# Patient Record
Sex: FEMALE | Race: WHITE | HISPANIC OR LATINO | Employment: OTHER | URBAN - METROPOLITAN AREA
[De-identification: names, ages, dates, MRNs, and addresses within clinical notes are randomized per-mention and may not be internally consistent; named-entity substitution may affect disease eponyms.]

---

## 2017-02-10 ENCOUNTER — GENERIC CONVERSION - ENCOUNTER (OUTPATIENT)
Dept: OTHER | Facility: OTHER | Age: 42
End: 2017-02-10

## 2018-02-01 ENCOUNTER — HOSPITAL ENCOUNTER (EMERGENCY)
Facility: HOSPITAL | Age: 43
Discharge: HOME | End: 2018-02-01
Attending: EMERGENCY MEDICINE | Admitting: EMERGENCY MEDICINE
Payer: MEDICARE

## 2018-02-01 VITALS
TEMPERATURE: 98.3 F | SYSTOLIC BLOOD PRESSURE: 147 MMHG | RESPIRATION RATE: 16 BRPM | HEART RATE: 74 BPM | BODY MASS INDEX: 50.02 KG/M2 | OXYGEN SATURATION: 100 % | DIASTOLIC BLOOD PRESSURE: 89 MMHG | HEIGHT: 64 IN | WEIGHT: 293 LBS

## 2018-02-01 DIAGNOSIS — J01.90 ACUTE SINUSITIS: Primary | ICD-10-CM

## 2018-02-01 PROCEDURE — 99282 EMERGENCY DEPT VISIT SF MDM: CPT

## 2018-02-01 RX ORDER — ALBUTEROL SULFATE 90 UG/1
2 AEROSOL, METERED RESPIRATORY (INHALATION) EVERY 6 HOURS PRN
Qty: 1 INHALER | Refills: 0 | Status: SHIPPED | OUTPATIENT
Start: 2018-02-01

## 2018-02-01 RX ORDER — FLUTICASONE PROPIONATE 50 MCG
2 SPRAY, SUSPENSION (ML) NASAL DAILY
Qty: 1 BOTTLE | Refills: 0 | Status: SHIPPED | OUTPATIENT
Start: 2018-02-01

## 2018-02-01 RX ORDER — LEVOFLOXACIN 500 MG/1
500 TABLET, FILM COATED ORAL DAILY
Qty: 10 TABLET | Refills: 0 | Status: SHIPPED | OUTPATIENT
Start: 2018-02-01 | End: 2018-02-11

## 2018-02-01 NOTE — ED PROVIDER NOTES
History  Chief Complaint   Patient presents with    Nasal Congestion     Patient stated that her eyes are swollen, nasal congestion and nausea since   Patient is a 47-year-old female  She presents to emergency room with several days nasal congestion and sinus pain  She has been experiencing chills  No measured fever  He has had headache  No neck pain  No photophobia  No mental status changes  She does have a slight cough  No sore throat  No myalgias  No shortness of breath  Symptoms are moderate in intensity  No aggravating or relieving factors  None       Past Medical History:   Diagnosis Date    Asthma     Hypertension        Past Surgical History:   Procedure Laterality Date     SECTION         History reviewed  No pertinent family history  I have reviewed and agree with the history as documented  Social History   Substance Use Topics    Smoking status: Former Smoker    Smokeless tobacco: Never Used    Alcohol use No        Review of Systems   Constitutional: Positive for chills  Negative for fever  HENT: Positive for congestion, postnasal drip, rhinorrhea and sinus pain  Negative for sinus pressure and sore throat  Eyes: Negative for pain, redness and visual disturbance  Respiratory: Positive for cough  Negative for shortness of breath  Cardiovascular: Negative for chest pain and leg swelling  Gastrointestinal: Negative for abdominal pain, diarrhea and vomiting  Endocrine: Negative for polydipsia and polyuria  Genitourinary: Negative for dysuria, frequency, hematuria, vaginal bleeding and vaginal discharge  Musculoskeletal: Negative for back pain and neck pain  Skin: Negative for rash and wound  Allergic/Immunologic: Negative for immunocompromised state  Neurological: Positive for headaches  Negative for weakness and numbness  Hematological: Does not bruise/bleed easily     Psychiatric/Behavioral: Negative for hallucinations and suicidal ideas  All other systems reviewed and are negative  Physical Exam  ED Triage Vitals [02/01/18 1028]   Temperature Pulse Respirations Blood Pressure SpO2   98 3 °F (36 8 °C) 74 16 147/89 100 %      Temp Source Heart Rate Source Patient Position - Orthostatic VS BP Location FiO2 (%)   Oral -- Sitting Left arm --      Pain Score       --           Orthostatic Vital Signs  Vitals:    02/01/18 1028   BP: 147/89   Pulse: 74   Patient Position - Orthostatic VS: Sitting       Physical Exam   Constitutional: She is oriented to person, place, and time  She appears well-developed and well-nourished  No distress  Obese female  HENT:   Head: Normocephalic and atraumatic  Mouth/Throat: Oropharynx is clear and moist    There is sinus tenderness  Eyes: Conjunctivae are normal  Right eye exhibits no discharge  Left eye exhibits no discharge  No scleral icterus  Neck: Normal range of motion  Neck supple  Cardiovascular: Normal rate, regular rhythm, normal heart sounds and intact distal pulses  Exam reveals no gallop and no friction rub  No murmur heard  Pulmonary/Chest: Effort normal and breath sounds normal  No stridor  No respiratory distress  She has no wheezes  She has no rales  Abdominal: Soft  Bowel sounds are normal  She exhibits no distension  There is no tenderness  There is no rebound and no guarding  Musculoskeletal: Normal range of motion  She exhibits no edema, tenderness or deformity  No CVA tenderness  No calf tenderness/palpable cords  Neurological: She is alert and oriented to person, place, and time  She has normal strength  No sensory deficit  GCS eye subscore is 4  GCS verbal subscore is 5  GCS motor subscore is 6  Skin: Skin is warm and dry  No rash noted  She is not diaphoretic  Psychiatric: She has a normal mood and affect  Her behavior is normal    Vitals reviewed        ED Medications  Medications - No data to display    Diagnostic Studies  Results Reviewed     None No orders to display              Procedures  Procedures       Phone Contacts  ED Phone Contact    ED Course  ED Course                                MDM  CritCare Time    Disposition  Final diagnoses:   Acute sinusitis     Time reflects when diagnosis was documented in both MDM as applicable and the Disposition within this note     Time User Action Codes Description Comment    2/1/2018 12:15 PM Michael Schultz Add [J01 90] Acute sinusitis       ED Disposition     ED Disposition Condition Comment    Discharge  Iman Archana discharge to home/self care  Condition at discharge: Good        Follow-up Information     Follow up With Specialties Details Why Contact Info    Infol[employer]   Follow-up with family doctor if not better in 1 week, sooner if any problems [phone]          Discharge Medication List as of 2/1/2018 12:16 PM      START taking these medications    Details   albuterol (PROVENTIL HFA,VENTOLIN HFA) 90 mcg/act inhaler Inhale 2 puffs every 6 (six) hours as needed for wheezing or shortness of breath, Starting u 2/1/2018, Print      fluticasone (FLONASE) 50 mcg/act nasal spray 2 sprays into each nostril daily As needed for sinus congestion, Starting u 2/1/2018, Print           No discharge procedures on file      ED Provider  Electronically Signed by           Roberto Carlos Walton MD  02/01/18 0102

## 2018-02-01 NOTE — DISCHARGE INSTRUCTIONS

## 2018-03-07 NOTE — PROGRESS NOTES
History of Present Illness    Revaccination   Vaccine Information: Vaccine(s) Given (names): [id number]  Spoke with patient regarding vaccine out of temperature range  Action(s): Pt will be revaccinated  Appointment scheduled: 03/25/171  Pt called (attempt 1): 02/10/17  pm    Pt called (attempt 2): 02/27/17  pm    Pt called (attempt 3): 03/07/17  pm    Other Information: 02/10/17 voicemail full  02/10/17 REVAC  she will call back to reschedule when she is not sick anymore  pm  02/27/17 will call back  03/07/17 appt 03/15/17  Active Problems    1  Allergic rhinitis (477 9) (J30 9)   2  Anxiety disorder (300 00) (F41 9)   3  Chronic daily headache (784 0) (R51)   4  Chronic pain disorder (338 4) (G89 4)   5  Chronic sinusitis (473 9) (J32 9)   6  Depression (311) (F32 9)   7  Fibromyalgia, secondary (729 1) (M79 7)   8  Hypertension (401 9) (I10)   9  Hypothyroidism (244 9) (E03 9)   10  Mild asthma (493 90) (J45 998)   11  Need for influenza vaccination (V04 81) (Z23)   12  Obesity (278 00) (E66 9)   13  Obstructive sleep apnea (327 23) (G47 33)    Immunizations  Influenza --- [facility]: [id number] Siad: [id number] Kaiser: 21-Sep-2015; Series4:  29-Sep-2016   PPSV --- Series1: 21-Sep-2015   Tdap --- Series1: 17-Nov-2014     Current Meds   1  Albuterol Sulfate (2 5 MG/3ML) 0 083% Inhalation Nebulization Solution; USE 1 UNIT   DOSE EVERY 4-6 HOURS AS NEEDED FOR WHEEZING    2  ALPRAZolam 1 MG Oral Tablet; take 1 tablet by mouth at bedtime as needed   3  Amitriptyline HCl - 75 MG Oral Tablet; TAKE 1 TABLET TWICE DAILY AS NEEDED   4  Azelastine HCl - 0 1 % Nasal Solution; USE 1 TO 2 SPRAYS IN EACH NOSTRIL TWICE   DAILY AS NEEDED   5  Carvedilol 6 25 MG Oral Tablet; take one tablet by mouth twice daily   6  DULoxetine HCl - 60 MG Oral Capsule Delayed Release Particles; TAKE 1 CAPSULE   DAILY   7  Furosemide 40 MG Oral Tablet; Take 1 tablet twice daily as needed   8   Gabapentin 300 MG Oral Capsule; 3 CAPSULES TID   9  Hydrocodone-Acetaminophen 5-325 MG Oral Tablet; Take 1 tablet every eight hours as   needed for pain   10  Levothyroxine Sodium 25 MCG Oral Tablet; TAKE ONE AND ONE-HALF TABLETS BY    MOUTH ONCE DAILY   11  Loratadine 10 MG Oral Tablet; Take 1 tablet daily   12  Losartan Potassium 100 MG Oral Tablet; TAKE ONE TABLET BY MOUTH ONCE DAILY    FOR  HIGH  BLOOD  PRESSURE   13  Montelukast Sodium 10 MG Oral Tablet; take 1 tablet by mouth every day   14  ProAir  (90 Base) MCG/ACT Inhalation Aerosol Solution; INHALE 1 TO 2 PUFFS    EVERY 4 TO 6 HOURS AS NEEDED   15  TiZANidine HCl - 4 MG Oral Tablet; TAKE 2 TABLETS AT BEDTIME   16  TraMADol HCl - 50 MG Oral Tablet; TAKE 1 TABLET Daily PRN pain   17  TraZODone HCl - 150 MG Oral Tablet; Take 1 tablet by mouth at bedtime    Allergies    1   Amoxicillin TABS    Signatures   Electronically signed by : Ana Lilia Knowles MD; Mar  7 2017 12:44PM EST                       (Author)

## 2018-03-25 ENCOUNTER — HOSPITAL ENCOUNTER (EMERGENCY)
Facility: HOSPITAL | Age: 43
Discharge: HOME | End: 2018-03-25
Attending: EMERGENCY MEDICINE | Admitting: EMERGENCY MEDICINE
Payer: COMMERCIAL

## 2018-03-25 VITALS
OXYGEN SATURATION: 98 % | DIASTOLIC BLOOD PRESSURE: 61 MMHG | TEMPERATURE: 98.2 F | RESPIRATION RATE: 18 BRPM | HEART RATE: 73 BPM | SYSTOLIC BLOOD PRESSURE: 112 MMHG

## 2018-03-25 DIAGNOSIS — M54.16 LUMBAR RADICULOPATHY, RIGHT: Primary | ICD-10-CM

## 2018-03-25 DIAGNOSIS — M54.50 LOW BACK PAIN: ICD-10-CM

## 2018-03-25 PROCEDURE — 96372 THER/PROPH/DIAG INJ SC/IM: CPT

## 2018-03-25 PROCEDURE — 99283 EMERGENCY DEPT VISIT LOW MDM: CPT

## 2018-03-25 RX ORDER — PREDNISONE 20 MG/1
20 TABLET ORAL DAILY
Qty: 18 TABLET | Refills: 0 | Status: SHIPPED | OUTPATIENT
Start: 2018-03-25

## 2018-03-25 RX ORDER — KETOROLAC TROMETHAMINE 30 MG/ML
30 INJECTION, SOLUTION INTRAMUSCULAR; INTRAVENOUS ONCE
Status: COMPLETED | OUTPATIENT
Start: 2018-03-25 | End: 2018-03-25

## 2018-03-25 RX ORDER — METHOCARBAMOL 500 MG/1
500 TABLET, FILM COATED ORAL 2 TIMES DAILY
Qty: 20 TABLET | Refills: 0 | Status: SHIPPED | OUTPATIENT
Start: 2018-03-25

## 2018-03-25 RX ORDER — PREDNISONE 20 MG/1
40 TABLET ORAL ONCE
Status: COMPLETED | OUTPATIENT
Start: 2018-03-25 | End: 2018-03-25

## 2018-03-25 RX ADMIN — KETOROLAC TROMETHAMINE 30 MG: 30 INJECTION, SOLUTION INTRAMUSCULAR at 14:50

## 2018-03-25 RX ADMIN — PREDNISONE 40 MG: 20 TABLET ORAL at 14:50

## 2018-03-25 NOTE — ED PROVIDER NOTES
History  Chief Complaint   Patient presents with    Leg Pain     patient presents to the ED with c/o right sided pain that starts at the hip and radiates to her toes  patient has hx of sciatica      Patient is a 27-year-old female with a history of chronic low back pain and right-sided sciatica on Percocet 7 5/325 and Zanaflex for pain control through her pain management physician presents today due to worsening pain into her right leg with no new recent trauma  Patient otherwise states that the pain is worse with movement walking and better with rest   She denies fevers chills nausea vomiting diarrhea abdominal pain incontinence of bowel or bladder or urinary retention  Prior to Admission Medications   Prescriptions Last Dose Informant Patient Reported? Taking? albuterol (PROVENTIL HFA,VENTOLIN HFA) 90 mcg/act inhaler   No No   Sig: Inhale 2 puffs every 6 (six) hours as needed for wheezing or shortness of breath   fluticasone (FLONASE) 50 mcg/act nasal spray   No No   Si sprays into each nostril daily As needed for sinus congestion      Facility-Administered Medications: None       Past Medical History:   Diagnosis Date    Asthma     Hypertension        Past Surgical History:   Procedure Laterality Date     SECTION         History reviewed  No pertinent family history  I have reviewed and agree with the history as documented  Social History   Substance Use Topics    Smoking status: Former Smoker    Smokeless tobacco: Never Used    Alcohol use No        Review of Systems   Constitutional: Negative for chills, fatigue, fever and unexpected weight change  Respiratory: Negative for cough and wheezing  Cardiovascular: Negative for chest pain  Gastrointestinal: Negative for abdominal pain, nausea and vomiting  Musculoskeletal: Positive for arthralgias, back pain and gait problem  Negative for joint swelling, myalgias and neck stiffness     Skin: Negative for pallor, rash and wound    Neurological: Negative for dizziness, weakness, numbness and headaches  Physical Exam  ED Triage Vitals [03/25/18 1404]   Temperature Pulse Respirations Blood Pressure SpO2   98 2 °F (36 8 °C) 73 18 112/61 98 %      Temp src Heart Rate Source Patient Position - Orthostatic VS BP Location FiO2 (%)   -- -- -- -- --      Pain Score       --           Orthostatic Vital Signs  Vitals:    03/25/18 1404   BP: 112/61   Pulse: 73       Physical Exam   Constitutional: She is oriented to person, place, and time  She appears well-developed and well-nourished  HENT:   Head: Normocephalic and atraumatic  Eyes: EOM are normal  Pupils are equal, round, and reactive to light  Neck: Normal range of motion  Neck supple  Cardiovascular: Normal rate, regular rhythm, normal heart sounds and intact distal pulses  Exam reveals no gallop and no friction rub  No murmur heard  Pulmonary/Chest: Effort normal and breath sounds normal  No respiratory distress  She has no wheezes  She has no rales  She exhibits no tenderness  Abdominal: Soft  She exhibits no distension and no mass  There is no tenderness  There is no rebound and no guarding  No hernia  Musculoskeletal: Normal range of motion  Neurological: She is alert and oriented to person, place, and time  She displays normal reflexes  No sensory deficit  She exhibits normal muscle tone  Skin: Skin is warm and dry  Psychiatric: She has a normal mood and affect   Her behavior is normal        ED Medications  Medications   predniSONE tablet 40 mg (40 mg Oral Given 3/25/18 1450)   ketorolac (TORADOL) injection 30 mg (30 mg Intramuscular Given 3/25/18 1450)       Diagnostic Studies  Results Reviewed     None                 No orders to display              Procedures  Procedures       Phone Contacts  ED Phone Contact    ED Course  ED Course                                MDM  Number of Diagnoses or Management Options  Diagnosis management comments: Patient with a history of chronic low back pain and right-sided sciatica presents to the emergency department with worsening symptoms on the right side no new recent trauma  Patient has positive straight leg raise on the right side increasing pain into her right leg and lower back  She denies incontinence of bowel or bladder urinary retention she has no saddle anesthesia abdominal pain  I did query patient on a drug monitoring site and she did have 120 oxycodone 7 5 milligram tablets filled on 03/01/2018 which was month supplies she still should have enough for the next week  At this time will treat with steroids to hopefully reduce the inflammation around the nerve and educated patient that I cannot give her any additional narcotics as this is a chronic issue and she is under care pain management contract  Return precautions and anticipatory guidance discussed  Patient verbalized understanding  CritCare Time    Disposition  Final diagnoses:   Lumbar radiculopathy, right   Low back pain     Time reflects when diagnosis was documented in both MDM as applicable and the Disposition within this note     Time User Action Codes Description Comment    3/25/2018  2:39 PM Verneda Repress Add [M54 16] Lumbar radiculopathy, right     3/25/2018  2:39 PM Verneda Repress Add [M54 5] Low back pain       ED Disposition     ED Disposition Condition Comment    Discharge  [place] discharge to home/self care      Condition at discharge: Stable        Follow-up Information     Follow up With Specialties Details Why [address] [facility]   [facility] [id number] [phone]        Discharge Medication List as of 3/25/2018  2:43 PM      START taking these medications    Details   methocarbamol (ROBAXIN) 500 mg tablet Take 1 tablet (500 mg total) by mouth 2 (two) times a day, Starting Sun 3/25/2018, Print      predniSONE 20 mg tablet Take 1 tablet (20 mg total) by mouth daily Take 3 tabs for 3 days, then 2 tabs for 3 days, and 1 tab for last 3 days  , Starting Sun 3/25/2018, Print         CONTINUE these medications which have NOT CHANGED    Details   albuterol (PROVENTIL HFA,VENTOLIN HFA) 90 mcg/act inhaler Inhale 2 puffs every 6 (six) hours as needed for wheezing or shortness of breath, Starting Thu 2/1/2018, Print      fluticasone (FLONASE) 50 mcg/act nasal spray 2 sprays into each nostril daily As needed for sinus congestion, Starting u 2/1/2018, Print           No discharge procedures on file      ED Provider  Electronically Signed by           Kip Thurston PA-C  03/25/18 5402

## 2018-03-25 NOTE — DISCHARGE INSTRUCTIONS
Acute Low Back Pain, Ambulatory Care   GENERAL INFORMATION:   Acute low back pain  is discomfort in your lower back area that lasts for less than 12 weeks  The word acute is used to describe pain that starts suddenly, worsens quickly, and lasts for a short time  Common symptoms include the following:   · Back stiffness or spasms    · Pain down the back or side of one leg    · Holding yourself in an unusual position or posture to decrease your back pain    · Not being able to find a sitting position that is comfortable    · Slow increase in your pain for 24 to 48 hours after you stress your back    · Tenderness on your lower back or severe pain when you move your back  Seek immediate care for the following symptoms:   · Severe pain    · Sudden stiffness and heaviness in both buttocks down to both legs    · Numbness or weakness in one leg, or pain in both legs    · Numbness in your genital area or across your lower back    · Unable to control your urine or bowel movements  Treatment for acute low back pain  may include any of the following:  · Medicines:      ¨ NSAIDs  help decrease swelling and pain or fever  This medicine is available with or without a doctor's order  NSAIDs can cause stomach bleeding or kidney problems in certain people  If you take blood thinner medicine, always ask your healthcare provider if NSAIDs are safe for you  Always read the medicine label and follow directions  ¨ Muscle relaxers  help decrease muscle spasms pain  ¨ Prescription pain medicine  may be given  Ask how to take this medicine safely  · Surgery  may be needed if your pain is severe and other treatments do not work  Surgery may be needed for conditions of the lumbar spine, such as herniated disc or spinal stenosis  Manage your symptoms:   · Sleep on a firm mattress  If you do not have a firm mattress, have someone move your mattress to the floor for a few days   A piece of plywood under your mattress can also help make it firmer  · Apply ice  on your lower back for 15 to 20 minutes every hour or as directed  Use an ice pack, or put crushed ice in a plastic bag  Cover it with a towel  Ice helps prevent tissue damage and decreases swelling and pain  You can alternate ice and heat  · Apply heat  on your lower back for 20 to 30 minutes every 2 hours for as many days as directed  Heat helps decrease pain and muscle spasms  · Go to physical therapy  A physical therapist teaches you exercises to help improve movement and strength, and to decrease pain  Prevent acute low back pain:   · Use proper body mechanics  ¨ Bend at the hips and knees when you  objects  Do not bend from the waist  Use your leg muscles as you lift the load  Do not use your back  Keep the object close to your chest as you lift it  Try not to twist or lift anything above your waist     ¨ Change your position often when you stand for long periods of time  Rest one foot on a small box or footrest, and then switch to the other foot often  ¨ Try not to sit for long periods of time  When you do, sit in a straight-backed chair with your feet flat on the floor  Never reach, pull, or push while you are sitting  · Exercise regularly  Warm up before you exercise  Do exercises that strengthen your back muscles  Ask about the best exercise plan for you  · Maintain a healthy weight  Ask your healthcare provider how much you should weigh  Ask him to help you create a weight loss plan if you are overweight  Follow up with your healthcare provider as directed:  Return for a follow-up visit if you still have pain after 1 to 3 weeks of treatment  You may need to visit an orthopedist if your back pain lasts more than 6 to 12 weeks  Write down your questions so you remember to ask them during your visits  CARE AGREEMENT:   You have the right to help plan your care  Learn about your health condition and how it may be treated   Discuss treatment options with your caregivers to decide what care you want to receive  You always have the right to refuse treatment  The above information is an  only  It is not intended as medical advice for individual conditions or treatments  Talk to your doctor, nurse or pharmacist before following any medical regimen to see if it is safe and effective for you  © 2014 [address] is for End User's use only and may not be sold, redistributed or otherwise used for commercial purposes  All illustrations and images included in CareNotes® are the copyrighted property of [employer]  or Aron Antonio  Lumbar Radiculopathy   American College of Radiology (ACR): Low Back Pain: ACR Appropriateness Criteria(R)  [employer] of [employer] (ACR)  [facility]  2008  Available from URL: [employer] uk  aspx  As accessed 2011-03-23  Manjeet JAEGER & Luis MACY: Lumbar Radiculopathy  In: Jefferson Thao 37, 33 Miriam Ken, eds  Essentials of Physical Medicine and Rehabilitation: Musculoskeletal Disorders, Pain, and Rehabilitation, 2nd ed  [address] , [place], [place], [facility]  [place] for Clinical Systems Improvement (ICSI): Adult Low Back Pain  [place] for Clinical Systems Improvement (ICSI)  [place], [place]  2010  Available from URL: http://www Moneylib/  As accessed 2011-03-23  Soraida SE: Lumbar (Intervertebral) Disk Disorders  In: Ky KING, ed  The 5-Minute Clinical Consult 2011, [address] [facility] [facility], [place], [place], 2010  Margeret Clock: Musculoskeletal Back Pain  In: Jacqueline Reyes, Dwight RS, Yaw RM, et al  Daniela Rodriguez's Emergency Medicine: Concepts and Clinical Practice, 7th ed  [address] [facility], [place], [place], 2010  Jarvis L, [address] [facility] MV: Lumbar Radiculopathy  In: DAJUAN BURT, ed   Pain Management, 2nd ed  [address], [place], [place], 2011 © 2017 [address] Information is for End User's use only and may not be sold, redistributed or otherwise used for commercial purposes  All illustrations and images included in CareNotes® are the copyrighted property of A D A M , Inc  or Aron Antonio  The above information is an  only  It is not intended as medical advice for individual conditions or treatments  Talk to your doctor, nurse or pharmacist before following any medical regimen to see if it is safe and effective for you  Lumbar Radiculopathy   American College of Radiology (ACR): Low Back Pain: ACR Appropriateness Criteria(R)  [employer] of Radiology (ACR)  [place], [place]  2008  Available from URL: [facility] Center[facility]ionics[facility] co uk  aspx  As accessed 2011-03-23  Manjeet JAEGER & Luis MACY: Lumbar Radiculopathy  In: Jefferson Hoffmann 37, [address] [facility] Jean-Pierre Chaves, eds  Essentials of Physical Medicine and Rehabilitation: Musculoskeletal Disorders, Pain, and Rehabilitation, 2nd ed  [address], [place], [place], 2008  [place] for Clinical Systems Improvement (ICSI): Adult Low Back Pain  [place] for Clinical Systems Improvement (ICSI)  [place], [place]  2010  Available from URL: http://Elixserve/  As accessed 2011-03-23  Soraida SE: Lumbar (Intervertebral) Disk Disorders  In: Ky KING, ed  The 5-Minute Clinical Consult 2011, [address] [facility] [place], [place], 2010  Nadeem Mays: Musculoskeletal Back Pain  In: Whimseybox, Dwight RS, Yaw RM, et al  Tre Rodriguez's Emergency Medicine: Concepts and Clinical Practice, 7th ed  [address] [place] [address], 2010  Jarvis L, [address] [facility] MV: Lumbar Radiculopathy  In: DAJUAN SD, ed  Pain Management, 2nd ed  [address], [place], [place], 2011 © 2017 [facility] [address] is for End User's use only and may not be sold, redistributed or otherwise used for commercial purposes  All illustrations and images included in CareNotes® are the copyrighted property of A D A M , Inc  or Aron Antonio  The above information is an  only  It is not intended as medical advice for individual conditions or treatments  Talk to your doctor, nurse or pharmacist before following any medical regimen to see if it is safe and effective for you

## 2019-05-30 ENCOUNTER — APPOINTMENT (OUTPATIENT)
Dept: LAB | Facility: CLINIC | Age: 44
End: 2019-05-30
Payer: COMMERCIAL

## 2019-05-30 ENCOUNTER — TRANSCRIBE ORDERS (OUTPATIENT)
Dept: LAB | Facility: CLINIC | Age: 44
End: 2019-05-30

## 2019-05-30 DIAGNOSIS — E03.9 MYXEDEMA HEART DISEASE: ICD-10-CM

## 2019-05-30 DIAGNOSIS — E55.9 AVITAMINOSIS D: ICD-10-CM

## 2019-05-30 DIAGNOSIS — E78.5 HYPERLIPIDEMIA, UNSPECIFIED HYPERLIPIDEMIA TYPE: Primary | ICD-10-CM

## 2019-05-30 DIAGNOSIS — E13.8 DIABETES MELLITUS OF OTHER TYPE WITH COMPLICATION, UNSPECIFIED WHETHER LONG TERM INSULIN USE: ICD-10-CM

## 2019-05-30 DIAGNOSIS — I51.9 MYXEDEMA HEART DISEASE: ICD-10-CM

## 2019-05-30 DIAGNOSIS — D64.9 ANEMIA, UNSPECIFIED TYPE: ICD-10-CM

## 2019-05-30 LAB
ALBUMIN SERPL BCP-MCNC: 3.8 G/DL (ref 3.5–5)
ALP SERPL-CCNC: 75 U/L (ref 46–116)
ALT SERPL W P-5'-P-CCNC: 21 U/L (ref 12–78)
ANION GAP SERPL CALCULATED.3IONS-SCNC: 4 MMOL/L (ref 4–13)
AST SERPL W P-5'-P-CCNC: 10 U/L (ref 5–45)
BACTERIA UR QL AUTO: ABNORMAL /HPF
BILIRUB SERPL-MCNC: 0.51 MG/DL (ref 0.2–1)
BILIRUB UR QL STRIP: NEGATIVE
BUN SERPL-MCNC: 9 MG/DL (ref 5–25)
CALCIUM SERPL-MCNC: 8.6 MG/DL (ref 8.3–10.1)
CHLORIDE SERPL-SCNC: 104 MMOL/L (ref 100–108)
CHOLEST SERPL-MCNC: 166 MG/DL (ref 50–200)
CLARITY UR: CLEAR
CO2 SERPL-SCNC: 28 MMOL/L (ref 21–32)
COLOR UR: ABNORMAL
CREAT SERPL-MCNC: 0.72 MG/DL (ref 0.6–1.3)
CREAT UR-MCNC: 170 MG/DL
ERYTHROCYTE [DISTWIDTH] IN BLOOD BY AUTOMATED COUNT: 14.8 % (ref 11.6–15.1)
EST. AVERAGE GLUCOSE BLD GHB EST-MCNC: 148 MG/DL
GFR SERPL CREATININE-BSD FRML MDRD: 101 ML/MIN/1.73SQ M
GLUCOSE P FAST SERPL-MCNC: 108 MG/DL (ref 65–99)
GLUCOSE UR STRIP-MCNC: NEGATIVE MG/DL
HBA1C MFR BLD: 6.8 % (ref 4.2–6.3)
HCT VFR BLD AUTO: 41.8 % (ref 34.8–46.1)
HDLC SERPL-MCNC: 52 MG/DL (ref 40–60)
HGB BLD-MCNC: 12.6 G/DL (ref 11.5–15.4)
HGB UR QL STRIP.AUTO: NEGATIVE
HYALINE CASTS #/AREA URNS LPF: ABNORMAL /LPF
KETONES UR STRIP-MCNC: NEGATIVE MG/DL
LDLC SERPL CALC-MCNC: 90 MG/DL (ref 0–100)
LEUKOCYTE ESTERASE UR QL STRIP: NEGATIVE
MCH RBC QN AUTO: 26.7 PG (ref 26.8–34.3)
MCHC RBC AUTO-ENTMCNC: 30.1 G/DL (ref 31.4–37.4)
MCV RBC AUTO: 89 FL (ref 82–98)
MICROALBUMIN UR-MCNC: 15 MG/L (ref 0–20)
MICROALBUMIN/CREAT 24H UR: 9 MG/G CREATININE (ref 0–30)
NITRITE UR QL STRIP: NEGATIVE
NON-SQ EPI CELLS URNS QL MICRO: ABNORMAL /HPF
NONHDLC SERPL-MCNC: 114 MG/DL
PH UR STRIP.AUTO: 6 [PH]
PLATELET # BLD AUTO: 402 THOUSANDS/UL (ref 149–390)
PMV BLD AUTO: 10.8 FL (ref 8.9–12.7)
POTASSIUM SERPL-SCNC: 4.2 MMOL/L (ref 3.5–5.3)
PROT SERPL-MCNC: 8.2 G/DL (ref 6.4–8.2)
PROT UR STRIP-MCNC: NEGATIVE MG/DL
RBC # BLD AUTO: 4.72 MILLION/UL (ref 3.81–5.12)
RBC #/AREA URNS AUTO: ABNORMAL /HPF
SODIUM SERPL-SCNC: 136 MMOL/L (ref 136–145)
SP GR UR STRIP.AUTO: 1.02 (ref 1–1.03)
T4 FREE SERPL-MCNC: 1.47 NG/DL (ref 0.76–1.46)
TRIGL SERPL-MCNC: 118 MG/DL
TSH SERPL DL<=0.05 MIU/L-ACNC: 0.26 UIU/ML (ref 0.36–3.74)
UROBILINOGEN UR QL STRIP.AUTO: 1 E.U./DL
WBC # BLD AUTO: 8.15 THOUSAND/UL (ref 4.31–10.16)
WBC #/AREA URNS AUTO: ABNORMAL /HPF

## 2019-05-30 PROCEDURE — 83036 HEMOGLOBIN GLYCOSYLATED A1C: CPT

## 2019-05-30 PROCEDURE — 81001 URINALYSIS AUTO W/SCOPE: CPT

## 2019-05-30 PROCEDURE — 82570 ASSAY OF URINE CREATININE: CPT

## 2019-05-30 PROCEDURE — 82306 VITAMIN D 25 HYDROXY: CPT

## 2019-05-30 PROCEDURE — 85027 COMPLETE CBC AUTOMATED: CPT

## 2019-05-30 PROCEDURE — 84439 ASSAY OF FREE THYROXINE: CPT

## 2019-05-30 PROCEDURE — 80053 COMPREHEN METABOLIC PANEL: CPT

## 2019-05-30 PROCEDURE — 82043 UR ALBUMIN QUANTITATIVE: CPT

## 2019-05-30 PROCEDURE — 84443 ASSAY THYROID STIM HORMONE: CPT

## 2019-05-30 PROCEDURE — 80061 LIPID PANEL: CPT

## 2019-05-30 PROCEDURE — 36415 COLL VENOUS BLD VENIPUNCTURE: CPT

## 2019-06-03 LAB
25(OH)D2 SERPL-MCNC: 12 NG/ML
25(OH)D3 SERPL-MCNC: 14 NG/ML
25(OH)D3+25(OH)D2 SERPL-MCNC: 26 NG/ML

## 2019-08-07 ENCOUNTER — APPOINTMENT (EMERGENCY)
Dept: RADIOLOGY | Facility: HOSPITAL | Age: 44
End: 2019-08-07
Payer: COMMERCIAL

## 2019-08-07 ENCOUNTER — APPOINTMENT (EMERGENCY)
Dept: CT IMAGING | Facility: HOSPITAL | Age: 44
End: 2019-08-07
Payer: COMMERCIAL

## 2019-08-07 ENCOUNTER — HOSPITAL ENCOUNTER (EMERGENCY)
Facility: HOSPITAL | Age: 44
Discharge: HOME | End: 2019-08-07
Attending: EMERGENCY MEDICINE
Payer: COMMERCIAL

## 2019-08-07 VITALS
SYSTOLIC BLOOD PRESSURE: 181 MMHG | OXYGEN SATURATION: 100 % | HEIGHT: 64 IN | TEMPERATURE: 97.8 F | DIASTOLIC BLOOD PRESSURE: 88 MMHG | WEIGHT: 293 LBS | RESPIRATION RATE: 17 BRPM | BODY MASS INDEX: 50.02 KG/M2 | HEART RATE: 83 BPM

## 2019-08-07 DIAGNOSIS — R51.9 ACUTE HEADACHE: Primary | ICD-10-CM

## 2019-08-07 LAB
ALBUMIN SERPL BCP-MCNC: 3.6 G/DL (ref 3.5–5)
ALP SERPL-CCNC: 89 U/L (ref 46–116)
ALT SERPL W P-5'-P-CCNC: 16 U/L (ref 12–78)
ANION GAP SERPL CALCULATED.3IONS-SCNC: 10 MMOL/L (ref 4–13)
AST SERPL W P-5'-P-CCNC: 11 U/L (ref 5–45)
BACTERIA UR QL AUTO: ABNORMAL /HPF
BASOPHILS # BLD AUTO: 0.05 THOUSANDS/ΜL (ref 0–0.1)
BASOPHILS NFR BLD AUTO: 1 % (ref 0–1)
BILIRUB SERPL-MCNC: 0.3 MG/DL (ref 0.2–1)
BILIRUB UR QL STRIP: NEGATIVE
BUN SERPL-MCNC: 9 MG/DL (ref 5–25)
CALCIUM SERPL-MCNC: 9.4 MG/DL (ref 8.3–10.1)
CHLORIDE SERPL-SCNC: 105 MMOL/L (ref 100–108)
CLARITY UR: CLEAR
CO2 SERPL-SCNC: 27 MMOL/L (ref 21–32)
COLOR UR: YELLOW
CREAT SERPL-MCNC: 0.77 MG/DL (ref 0.6–1.3)
DEPRECATED D DIMER PPP: 506 NG/ML (FEU)
EOSINOPHIL # BLD AUTO: 0.11 THOUSAND/ΜL (ref 0–0.61)
EOSINOPHIL NFR BLD AUTO: 1 % (ref 0–6)
ERYTHROCYTE [DISTWIDTH] IN BLOOD BY AUTOMATED COUNT: 15.3 % (ref 11.6–15.1)
EXT PREG TEST URINE: NEGATIVE
EXT. CONTROL ED NAV: NORMAL
GFR SERPL CREATININE-BSD FRML MDRD: 94 ML/MIN/1.73SQ M
GLUCOSE SERPL-MCNC: 113 MG/DL (ref 65–140)
GLUCOSE UR STRIP-MCNC: NEGATIVE MG/DL
HCT VFR BLD AUTO: 43.3 % (ref 34.8–46.1)
HGB BLD-MCNC: 13.3 G/DL (ref 11.5–15.4)
HGB UR QL STRIP.AUTO: NEGATIVE
IMM GRANULOCYTES # BLD AUTO: 0.03 THOUSAND/UL (ref 0–0.2)
IMM GRANULOCYTES NFR BLD AUTO: 0 % (ref 0–2)
KETONES UR STRIP-MCNC: NEGATIVE MG/DL
LEUKOCYTE ESTERASE UR QL STRIP: ABNORMAL
LYMPHOCYTES # BLD AUTO: 3.11 THOUSANDS/ΜL (ref 0.6–4.47)
LYMPHOCYTES NFR BLD AUTO: 29 % (ref 14–44)
MCH RBC QN AUTO: 27.7 PG (ref 26.8–34.3)
MCHC RBC AUTO-ENTMCNC: 30.7 G/DL (ref 31.4–37.4)
MCV RBC AUTO: 90 FL (ref 82–98)
MONOCYTES # BLD AUTO: 0.74 THOUSAND/ΜL (ref 0.17–1.22)
MONOCYTES NFR BLD AUTO: 7 % (ref 4–12)
NEUTROPHILS # BLD AUTO: 6.71 THOUSANDS/ΜL (ref 1.85–7.62)
NEUTS SEG NFR BLD AUTO: 62 % (ref 43–75)
NITRITE UR QL STRIP: NEGATIVE
NON-SQ EPI CELLS URNS QL MICRO: ABNORMAL /HPF
NRBC BLD AUTO-RTO: 0 /100 WBCS
PH UR STRIP.AUTO: 7 [PH]
PLATELET # BLD AUTO: 394 THOUSANDS/UL (ref 149–390)
PMV BLD AUTO: 10.5 FL (ref 8.9–12.7)
POTASSIUM SERPL-SCNC: 4.3 MMOL/L (ref 3.5–5.3)
PROT SERPL-MCNC: 7.7 G/DL (ref 6.4–8.2)
PROT UR STRIP-MCNC: NEGATIVE MG/DL
RBC # BLD AUTO: 4.8 MILLION/UL (ref 3.81–5.12)
RBC #/AREA URNS AUTO: ABNORMAL /HPF
SODIUM SERPL-SCNC: 142 MMOL/L (ref 136–145)
SP GR UR STRIP.AUTO: 1.01 (ref 1–1.03)
TROPONIN I SERPL-MCNC: <0.02 NG/ML
UROBILINOGEN UR QL STRIP.AUTO: 0.2 E.U./DL
WBC # BLD AUTO: 10.75 THOUSAND/UL (ref 4.31–10.16)
WBC #/AREA URNS AUTO: ABNORMAL /HPF

## 2019-08-07 PROCEDURE — 81025 URINE PREGNANCY TEST: CPT | Performed by: PHYSICIAN ASSISTANT

## 2019-08-07 PROCEDURE — 81001 URINALYSIS AUTO W/SCOPE: CPT | Performed by: PHYSICIAN ASSISTANT

## 2019-08-07 PROCEDURE — 70450 CT HEAD/BRAIN W/O DYE: CPT

## 2019-08-07 PROCEDURE — 71275 CT ANGIOGRAPHY CHEST: CPT

## 2019-08-07 PROCEDURE — 99284 EMERGENCY DEPT VISIT MOD MDM: CPT | Performed by: EMERGENCY MEDICINE

## 2019-08-07 PROCEDURE — 80053 COMPREHEN METABOLIC PANEL: CPT | Performed by: EMERGENCY MEDICINE

## 2019-08-07 PROCEDURE — 96374 THER/PROPH/DIAG INJ IV PUSH: CPT

## 2019-08-07 PROCEDURE — 85379 FIBRIN DEGRADATION QUANT: CPT | Performed by: PHYSICIAN ASSISTANT

## 2019-08-07 PROCEDURE — 71046 X-RAY EXAM CHEST 2 VIEWS: CPT

## 2019-08-07 PROCEDURE — 85025 COMPLETE CBC W/AUTO DIFF WBC: CPT | Performed by: EMERGENCY MEDICINE

## 2019-08-07 PROCEDURE — 84484 ASSAY OF TROPONIN QUANT: CPT | Performed by: EMERGENCY MEDICINE

## 2019-08-07 PROCEDURE — 99285 EMERGENCY DEPT VISIT HI MDM: CPT

## 2019-08-07 PROCEDURE — 96375 TX/PRO/DX INJ NEW DRUG ADDON: CPT

## 2019-08-07 PROCEDURE — 36415 COLL VENOUS BLD VENIPUNCTURE: CPT | Performed by: EMERGENCY MEDICINE

## 2019-08-07 PROCEDURE — 93005 ELECTROCARDIOGRAM TRACING: CPT

## 2019-08-07 RX ORDER — METOCLOPRAMIDE HYDROCHLORIDE 5 MG/ML
10 INJECTION INTRAMUSCULAR; INTRAVENOUS ONCE
Status: COMPLETED | OUTPATIENT
Start: 2019-08-07 | End: 2019-08-07

## 2019-08-07 RX ORDER — FENTANYL CITRATE 50 UG/ML
100 INJECTION, SOLUTION INTRAMUSCULAR; INTRAVENOUS ONCE
Status: COMPLETED | OUTPATIENT
Start: 2019-08-07 | End: 2019-08-07

## 2019-08-07 RX ORDER — DIPHENHYDRAMINE HYDROCHLORIDE 50 MG/ML
25 INJECTION INTRAMUSCULAR; INTRAVENOUS ONCE
Status: COMPLETED | OUTPATIENT
Start: 2019-08-07 | End: 2019-08-07

## 2019-08-07 RX ORDER — METHOCARBAMOL 500 MG/1
500 TABLET, FILM COATED ORAL ONCE
Status: COMPLETED | OUTPATIENT
Start: 2019-08-07 | End: 2019-08-07

## 2019-08-07 RX ADMIN — METHOCARBAMOL TABLETS 500 MG: 500 TABLET, COATED ORAL at 10:50

## 2019-08-07 RX ADMIN — METOCLOPRAMIDE 10 MG: 5 INJECTION, SOLUTION INTRAMUSCULAR; INTRAVENOUS at 10:50

## 2019-08-07 RX ADMIN — IOHEXOL 85 ML: 350 INJECTION, SOLUTION INTRAVENOUS at 12:23

## 2019-08-07 RX ADMIN — DIPHENHYDRAMINE HYDROCHLORIDE 25 MG: 50 INJECTION, SOLUTION INTRAMUSCULAR; INTRAVENOUS at 10:50

## 2019-08-07 RX ADMIN — FENTANYL CITRATE 100 MCG: 50 INJECTION, SOLUTION INTRAMUSCULAR; INTRAVENOUS at 14:51

## 2019-08-07 NOTE — ED PROVIDER NOTES
History  Chief Complaint   Patient presents with    Chest Pain     pt c/o chest pain and SOB      24-year-old female with past medical history significant for asthma and hypertension presents to the emergency department with multiple complaints including chest pain, myalgias, headache and shortness of breath  Patient reports symptoms have been going on for the past 1 day  Quality is reported as aching pain in the head and the body  Severity is reported as moderate  Associated symptoms:  Positive for headache  Positive for nausea  Denies diarrhea  Denies fevers  Positive for myalgias  Positive for cough and shortness of breath  Positive for left lower extremity swelling  Modifying factors:  Patient reports she had been taking her methocarbamol on prednisone for her headache without improvement in symptoms  Context:  Denies any recent fall injury or trauma  Reports a history of asthma and thinks this is likely contributing to her shortness of breath  Reports a history of headaches but has never had problems with the associated symptoms including the myalgias which were concerning to her  She does not take blood thinners  She is a reformed smoker  Reviewed past visits via [facility]:  Patient was last seen in the emergency department on March 25, 2018 for evaluation of low back pain  History provided by:  Patient   used: No    Chest Pain   Associated symptoms: fatigue, headache, nausea, shortness of breath and vomiting    Associated symptoms: no abdominal pain, no back pain, no cough, no diaphoresis, no dizziness, no dysphagia, no fever, no numbness, no palpitations and no weakness        Prior to Admission Medications   Prescriptions Last Dose Informant Patient Reported? Taking?    albuterol (PROVENTIL HFA,VENTOLIN HFA) 90 mcg/act inhaler   No No   Sig: Inhale 2 puffs every 6 (six) hours as needed for wheezing or shortness of breath   fluticasone (FLONASE) 50 mcg/act nasal spray No No   Si sprays into each nostril daily As needed for sinus congestion   methocarbamol (ROBAXIN) 500 mg tablet   No No   Sig: Take 1 tablet (500 mg total) by mouth 2 (two) times a day   predniSONE 20 mg tablet   No No   Sig: Take 1 tablet (20 mg total) by mouth daily Take 3 tabs for 3 days, then 2 tabs for 3 days, and 1 tab for last 3 days  Facility-Administered Medications: None       Past Medical History:   Diagnosis Date    Asthma     Hypertension        Past Surgical History:   Procedure Laterality Date     SECTION         History reviewed  No pertinent family history  I have reviewed and agree with the history as documented  Social History     Tobacco Use    Smoking status: Former Smoker    Smokeless tobacco: Never Used   Substance Use Topics    Alcohol use: No    Drug use: No        Review of Systems   Constitutional: Positive for fatigue  Negative for activity change, appetite change, chills, diaphoresis, fever and unexpected weight change  HENT: Negative for congestion, dental problem, drooling, ear discharge, ear pain, facial swelling, hearing loss, mouth sores, nosebleeds, postnasal drip, rhinorrhea, sinus pressure, sinus pain, sneezing, sore throat, tinnitus, trouble swallowing and voice change  Eyes: Negative for photophobia, pain, discharge, redness, itching and visual disturbance  Respiratory: Positive for chest tightness and shortness of breath  Negative for apnea, cough, choking, wheezing and stridor  Cardiovascular: Positive for leg swelling  Negative for chest pain and palpitations  Gastrointestinal: Positive for nausea and vomiting  Negative for abdominal distention, abdominal pain, anal bleeding, blood in stool, constipation, diarrhea and rectal pain  Endocrine: Negative for cold intolerance, heat intolerance, polydipsia, polyphagia and polyuria     Genitourinary: Negative for decreased urine volume, difficulty urinating, dyspareunia, dysuria, enuresis, flank pain, frequency, hematuria, menstrual problem, pelvic pain, urgency, vaginal bleeding, vaginal discharge and vaginal pain  Musculoskeletal: Positive for myalgias  Negative for arthralgias, back pain, gait problem, joint swelling, neck pain and neck stiffness  Skin: Negative for color change, pallor, rash and wound  Allergic/Immunologic: Negative for environmental allergies, food allergies and immunocompromised state  Neurological: Positive for headaches  Negative for dizziness, tremors, seizures, syncope, facial asymmetry, speech difficulty, weakness, light-headedness and numbness  Hematological: Negative for adenopathy  Does not bruise/bleed easily  Psychiatric/Behavioral: Negative for agitation, confusion, hallucinations, self-injury and suicidal ideas  The patient is not hyperactive  All other systems reviewed and are negative  Physical Exam  Physical Exam   Constitutional: She is oriented to person, place, and time  She appears well-developed and well-nourished  No distress  BP (!) 181/88 (BP Location: Left arm)   Pulse 83   Temp 97 8 °F (36 6 °C) (Oral)   Resp 17   Ht 5' 4" (1 626 m)   Wt 136 kg (299 lb 13 2 oz)   SpO2 100%   BMI 51 46 kg/m²      HENT:   Head: Normocephalic and atraumatic  Right Ear: External ear normal    Left Ear: External ear normal    Nose: Nose normal    Mouth/Throat: Oropharynx is clear and moist  No oropharyngeal exudate  Eyes: Conjunctivae and EOM are normal  Right eye exhibits no discharge  Left eye exhibits no discharge  No scleral icterus  Neck: Normal range of motion  Neck supple  No JVD present  No tracheal deviation present  Cardiovascular: Normal rate, regular rhythm and intact distal pulses  Pulmonary/Chest: Effort normal and breath sounds normal  No stridor  No respiratory distress  She has no wheezes  She has no rales  She exhibits no tenderness  Abdominal: Soft  Bowel sounds are normal  She exhibits no distension and no mass  There is no tenderness  There is no rebound and no guarding  No hernia  Musculoskeletal: Normal range of motion  She exhibits edema  She exhibits no tenderness or deformity  Left lower extremity is slightly more swollen than right lower extremity  Lymphadenopathy:     She has no cervical adenopathy  Neurological: She is alert and oriented to person, place, and time  She displays normal reflexes  No cranial nerve deficit or sensory deficit  She exhibits normal muscle tone  Coordination normal    Skin: Skin is warm and dry  Capillary refill takes less than 2 seconds  No rash noted  She is not diaphoretic  No erythema  No pallor  Psychiatric: She has a normal mood and affect  Her behavior is normal  Judgment and thought content normal    Nursing note and vitals reviewed        Vital Signs  ED Triage Vitals [08/07/19 0916]   Temperature Pulse Respirations Blood Pressure SpO2   97 8 °F (36 6 °C) 65 18 161/84 100 %      Temp Source Heart Rate Source Patient Position - Orthostatic VS BP Location FiO2 (%)   Oral Monitor Sitting Right arm --      Pain Score       8           Vitals:    08/07/19 0916 08/07/19 1051 08/07/19 1430 08/07/19 1453   BP: 161/84 134/61 128/68 (!) 181/88   Pulse: 65 73 63 83   Patient Position - Orthostatic VS: Sitting Lying Sitting Lying         Visual Acuity      ED Medications  Medications   methocarbamol (ROBAXIN) tablet 500 mg (500 mg Oral Given 8/7/19 1050)   metoclopramide (REGLAN) injection 10 mg (10 mg Intravenous Given 8/7/19 1050)   diphenhydrAMINE (BENADRYL) injection 25 mg (25 mg Intravenous Given 8/7/19 1050)   iohexol (OMNIPAQUE) 350 MG/ML injection (MULTI-DOSE) 85 mL (85 mL Intravenous Given 8/7/19 1223)   fentanyl citrate (PF) 100 MCG/2ML 100 mcg (100 mcg Intravenous Given 8/7/19 1451)       Diagnostic Studies  Results Reviewed     Procedure Component Value Units Date/Time    POCT pregnancy, urine [[id number]]  (Normal) Resulted:  08/07/19 1055    Lab Status:  Final result Updated:  08/07/19 1139     EXT PREG TEST UR (Ref: Negative) Negative     Control Valid    Urine Microscopic [[id number]]  (Abnormal) Collected:  08/07/19 1055    Lab Status:  Final result Specimen:  Urine, Clean Catch Updated:  08/07/19 1113     RBC, UA 0-1 /hpf      WBC, UA 0-1 /hpf      Epithelial Cells Occasional /hpf      Bacteria, UA Occasional /hpf     UA w Reflex to Microscopic w Reflex to Culture [[id number]]  (Abnormal) Collected:  08/07/19 1055    Lab Status:  Final result Specimen:  Urine, Clean Catch Updated:  08/07/19 1105     Color, UA Yellow     Clarity, UA Clear     Specific Gravity, UA 1 010     pH, UA 7 0     Leukocytes, UA Trace     Nitrite, UA Negative     Protein, UA Negative mg/dl      Glucose, UA Negative mg/dl      Ketones, UA Negative mg/dl      Urobilinogen, UA 0 2 E U /dl      Bilirubin, UA Negative     Blood, UA Negative    D-Dimer [[id number]]  (Abnormal) Collected:  08/07/19 0945    Lab Status:  Final result Specimen:  Blood from Arm, Right Updated:  08/07/19 1017     D-Dimer, Quant 506 ng/ml (FEU)     Troponin I [[id number]]  (Normal) Collected:  08/07/19 0945    Lab Status:  Final result Specimen:  Blood from Arm, Right Updated:  08/07/19 1012     Troponin I <0 02 ng/mL     Comprehensive metabolic panel [[id number]] Collected:  08/07/19 0945    Lab Status:  Final result Specimen:  Blood from Arm, Right Updated:  08/07/19 1011     Sodium 142 mmol/L      Potassium 4 3 mmol/L      Chloride 105 mmol/L      CO2 27 mmol/L      ANION GAP 10 mmol/L      BUN 9 mg/dL      Creatinine 0 77 mg/dL      Glucose 113 mg/dL      Calcium 9 4 mg/dL      AST 11 U/L      ALT 16 U/L      Alkaline Phosphatase 89 U/L      Total Protein 7 7 g/dL      Albumin 3 6 g/dL      Total Bilirubin 0 30 mg/dL      eGFR 94 ml/min/1 73sq m     Narrative:       Damon guidelines for Chronic Kidney Disease (CKD):     Stage 1 with normal or high GFR (GFR > 90 mL/min/1 73 square meters)    Stage 2 Mild CKD (GFR = 60-89 mL/min/1 73 square meters)    Stage 3A Moderate CKD (GFR = 45-59 mL/min/1 73 square meters)    Stage 3B Moderate CKD (GFR = 30-44 mL/min/1 73 square meters)    Stage 4 Severe CKD (GFR = 15-29 mL/min/1 73 square meters)    Stage 5 End Stage CKD (GFR <15 mL/min/1 73 square meters)  Note: GFR calculation is accurate only with a steady state creatinine    CBC and differential [[id number]]  (Abnormal) Collected:  08/07/19 0945    Lab Status:  Final result Specimen:  Blood from Arm, Right Updated:  08/07/19 0951     WBC 10 75 Thousand/uL      RBC 4 80 Million/uL      Hemoglobin 13 3 g/dL      Hematocrit 43 3 %      MCV 90 fL      MCH 27 7 pg      MCHC 30 7 g/dL      RDW 15 3 %      MPV 10 5 fL      Platelets 035 Thousands/uL      nRBC 0 /100 WBCs      Neutrophils Relative 62 %      Immat GRANS % 0 %      Lymphocytes Relative 29 %      Monocytes Relative 7 %      Eosinophils Relative 1 %      Basophils Relative 1 %      Neutrophils Absolute 6 71 Thousands/µL      Immature Grans Absolute 0 03 Thousand/uL      Lymphocytes Absolute 3 11 Thousands/µL      Monocytes Absolute 0 74 Thousand/µL      Eosinophils Absolute 0 11 Thousand/µL      Basophils Absolute 0 05 Thousands/µL                  CTA ED chest PE study   Final Result by Flavio Michelle MD (08/07 1249)      No intraluminal filling defect to suggest a pulmonary embolus  No infiltrate or pleural effusion  Workstation performed: [id number]         CT head without contrast   Final Result by Jhoana Gonzalez MD (08/07 1007)      No acute intracranial abnormality  Workstation performed: [id number]         XR chest 2 views   Final Result by Darek Barragan MD (08/07 0945)      No acute cardiopulmonary disease              Workstation performed: [id number]                    Procedures  ECG 12 Lead Documentation Only  Date/Time: 8/7/2019 9:46 AM  Performed by: Radha Meier PA-C  Authorized by: Radha Meier MARGARETTE     Indications / Diagnosis:  C/p  ECG reviewed by me, the ED Provider: yes    Patient location:  ED  Previous ECG:     Previous ECG:  Unavailable    Comparison to cardiac monitor: Yes    Interpretation:     Interpretation: normal    Rate:     ECG rate:  61    ECG rate assessment: normal    Rhythm:     Rhythm: sinus rhythm    Ectopy:     Ectopy: none    QRS:     QRS axis:  Normal    QRS intervals:  Normal  Conduction:     Conduction: normal    ST segments:     ST segments:  Normal  T waves:     T waves: normal             ED Course         HEART Risk Score      Most Recent Value   History  0 Filed at: 08/08/2019 0815   ECG  0 Filed at: 08/08/2019 0815   Age  0 Filed at: 08/08/2019 0815   Risk Factors  1 Filed at: 08/08/2019 0815   Troponin  0 Filed at: 08/08/2019 0815   Heart Score Risk Calculator   History  0 Filed at: 08/08/2019 0815   ECG  0 Filed at: 08/08/2019 0815   Age  0 Filed at: 08/08/2019 0815   Risk Factors  1 Filed at: 08/08/2019 0815   Troponin  0 Filed at: 08/08/2019 0815   HEART Score  1 Filed at: 08/08/2019 0815   HEART Score  1 Filed at: 08/08/2019 0815            PERC Rule for PE      Most Recent Value   PERC Rule for PE   Age >=50  0 Filed at: 08/08/2019 0815   HR >=100  0 Filed at: 08/08/2019 0815   O2 Sat on room air < 95%  0 Filed at: 08/08/2019 0815   History of PE or DVT  0 Filed at: 08/08/2019 0815   Recent trauma or surgery  0 Filed at: 08/08/2019 0815   Hemoptysis  0 Filed at: 08/08/2019 0815   Exogenous estrogen  0 Filed at: 08/08/2019 0815   Unilateral leg swelling  1 Filed at: 08/08/2019 0815   PERC Rule for PE Results  1 Filed at: 08/08/2019 0815              JESSICA Risk Score      Most Recent Value   Age >= 72  0 Filed at: 08/08/2019 0815   Known CAD (stenosis >= 50%)  0 Filed at: 08/08/2019 0815   Recent (<=24 hrs) Service Angina  1 Filed at: 08/08/2019 0815   ST Deviation >= 0 5 mm  0 Filed at: 08/08/2019 0815   3+ CAD Risk Factors (FHx, HTN, HLP, DM, Smoker)  0 Filed at: 08/08/2019 0815   Aspirin Use Past 7 Days  0 Filed at: 08/08/2019 0815   Elevated Cardiac Markers  0 Filed at: 08/08/2019 0815   JESSICA Risk Score (Calculated)  1 Filed at: 08/08/2019 3825        Wells' Criteria for PE      Most Recent Value   Wells' Criteria for PE   Clinical signs and symptoms of DVT  3 Filed at: 08/08/2019 2157   PE is primary diagnosis or equally likely  3 Filed at: 08/08/2019 0817   HR >100  0 Filed at: 08/08/2019 0817   Immobilization at least 3 days or Surgery in the previous 4 weeks  0 Filed at: 08/08/2019 0817   Previous, objectively diagnosed PE or DVT  0 Filed at: 08/08/2019 0817   Hemoptysis  0 Filed at: 08/08/2019 9655   Malignancy with treatment within 6 months or palliative  0 Filed at: 08/08/2019 6562   Wells' Criteria Total  6 Filed at: 08/08/2019 8305            MDM  Number of Diagnoses or Management Options  Acute headache: new and requires workup  Diagnosis management comments: Ddx includes but is not limited to:  1  ACS/USA  2  Pneumothorax  3  Pneumonia  4  Pleural effusion  5  Pericarditis  6  Pericardial effusion  7  Chest wall pain/costochondritis  8  Esophageal spasm  9  Pulmonary embolism  10  Bronchitis/bronchospasm  11  Aortic dissection  Plan cardiac workup including ekg, labs, cxr  Determination of disposition to made based upon risk factors, workup results and patient's ED coarse  ddx includes but is not limited to tension headache, migraine headache, cluster headache, sinusitis, SAH, SDH, doubt temporal arteritis due to lack of unilateral temporal location of pain, doubt intracranial hemorrhage, doubt meningitis due to lack of fever/nuchal rigidity  Chest x-ray images independently visualized and interpreted by me demonstrate no acute infiltrate or pneumothorax  Lab results reviewed  D-dimer is elevated at 506, troponin is normal at less than 0 02  Comprehensive metabolic panel demonstrates a BUN of 9 and a creatinine of 0 77 and no renal failure    Normal potassium of 4 3  CBC demonstrates mildly elevated white blood cell count of 10 7  Hemoglobin of 13 3 and hematocrit of 43 3 are normal   No anemia  Urinalysis remarkable for trace leukocytes, negative nitrates and negative blood  Pregnancy test is negative  Ct head images independently visualized by me  Radiology report reviewed: no acute intracranial abnormality  CT chest images visualized by me  Radiology report reviewed:FINDINGS:    PULMONARY ARTERIAL TREE:  No pulmonary embolus is seen  LUNGS:  Lungs are clear   There is no tracheal or endobronchial lesion  PLEURA:  Unremarkable  HEART/GREAT VESSELS:  Unremarkable for patient's age  MEDIASTINUM AND ESTER:  Unremarkable  CHEST WALL AND LOWER NECK:   Unremarkable  VISUALIZED STRUCTURES IN THE UPPER ABDOMEN:  Unremarkable  OSSEOUS STRUCTURES:  No acute fracture or destructive osseous lesion  I reviewed all the test results with patient at bedside  EKG is nonischemic  Troponin is negative  Symptoms are atypical for cardiac  Chest pain  Shortness of breath likely secondary to baseline asthma  CT of the chest demonstrates no pulmonary embolism despite elevated D-dimer  No infiltrates or pneumothorax  CT scan of the head demonstrates no intracranial abnormalities  No bleeding or skull fractures  Given muscular pain in the neck and upper back suspect tension headache is source of patient's headache  No real relief with methocarbamol given in the emergency department  Given her elevated blood pressure will avoid steroids and anti-inflammatories  She was given a dose of IV fentanyl with good resolution of symptoms here in the emergency department  Discussed with her follow up with her primary care physician for recheck of her elevated blood pressure  She is on prednisone suspect this may be contributing to her blood pressure as well    Patient is low risk based on cardiac decision making and has a negative cardiac enzyme and EKG in emergency department and will follow up as an outpatient for further evaluation of symptoms  Discussed treatment plan including follow-up with Neurology for further evaluation of headaches  Continue current medications at home for treatment of headaches  Reviewed reasons to return to ed  Patient verbalized understanding of diagnosis and agreement with discharge plan of care as well as understanding of reasons to return to ed                        Amount and/or Complexity of Data Reviewed  Clinical lab tests: ordered and reviewed  Tests in the radiology section of CPT®: ordered and reviewed  Tests in the medicine section of CPT®: ordered and reviewed  Discussion of test results with the performing providers: yes  Review and summarize past medical records: yes  Independent visualization of images, tracings, or specimens: yes    Patient Progress  Patient progress: stable      Disposition  Final diagnoses:   Acute headache     Time reflects when diagnosis was documented in both MDM as applicable and the Disposition within this note     Time User Action Codes Description Comment    8/7/2019  2:43 PM Evy Rome Add [R51] Acute headache       ED Disposition     ED Disposition Condition Date/Time Comment    Discharge Stable Wed Aug 7, 2019  2:43 PM Yanni Hutchison discharge to home/self care              Follow-up Information     Follow up With Specialties Details Why Contact Info Additional Information    Mihai Belcher MD Family Medicine Call in 1 day for further evaluation of symptoms [address] [facility] [address] 1227 [facility] Emergency Department Emergency Medicine Go to  If symptoms worsen [address] [facility] Richi Freed 1490 ED, [address] [place] [address], [address], MD Neurology Call in 1 day for further evaluation of symptoms [address] [place] [address] [place] [address] [place] [address]  [phone]             Discharge Medication List as of 8/7/2019  2:44 PM      CONTINUE these medications which have NOT CHANGED    Details   albuterol (PROVENTIL HFA,VENTOLIN HFA) 90 mcg/act inhaler Inhale 2 puffs every 6 (six) hours as needed for wheezing or shortness of breath, Starting Thu 2/1/2018, Print      fluticasone (FLONASE) 50 mcg/act nasal spray 2 sprays into each nostril daily As needed for sinus congestion, Starting Thu 2/1/2018, Print      methocarbamol (ROBAXIN) 500 mg tablet Take 1 tablet (500 mg total) by mouth 2 (two) times a day, Starting Sun 3/25/2018, Print      predniSONE 20 mg tablet Take 1 tablet (20 mg total) by mouth daily Take 3 tabs for 3 days, then 2 tabs for 3 days, and 1 tab for last 3 days  , Starting Sun 3/25/2018, Print           No discharge procedures on file      ED Provider  Electronically Signed by           Demetri Hodges PA-C  08/08/19 0997

## 2019-08-09 LAB
ATRIAL RATE: 61 BPM
P AXIS: 41 DEGREES
PR INTERVAL: 158 MS
QRS AXIS: 74 DEGREES
QRSD INTERVAL: 88 MS
QT INTERVAL: 448 MS
QTC INTERVAL: 450 MS
T WAVE AXIS: 73 DEGREES
VENTRICULAR RATE: 61 BPM

## 2019-08-09 PROCEDURE — 93010 ELECTROCARDIOGRAM REPORT: CPT | Performed by: INTERNAL MEDICINE

## 2020-04-01 ENCOUNTER — HOSPITAL ENCOUNTER (EMERGENCY)
Facility: HOSPITAL | Age: 45
Discharge: HOME | End: 2020-04-01
Attending: EMERGENCY MEDICINE | Admitting: EMERGENCY MEDICINE
Payer: COMMERCIAL

## 2020-04-01 VITALS
TEMPERATURE: 98.6 F | OXYGEN SATURATION: 95 % | BODY MASS INDEX: 47.46 KG/M2 | HEIGHT: 64 IN | RESPIRATION RATE: 18 BRPM | SYSTOLIC BLOOD PRESSURE: 158 MMHG | HEART RATE: 65 BPM | WEIGHT: 278 LBS | DIASTOLIC BLOOD PRESSURE: 77 MMHG

## 2020-04-01 DIAGNOSIS — B34.9 VIRAL SYNDROME: ICD-10-CM

## 2020-04-01 DIAGNOSIS — R11.2 NAUSEA AND VOMITING: Primary | ICD-10-CM

## 2020-04-01 LAB
ALBUMIN SERPL BCP-MCNC: 3.2 G/DL (ref 3.5–5)
ALP SERPL-CCNC: 70 U/L (ref 46–116)
ALT SERPL W P-5'-P-CCNC: 23 U/L (ref 12–78)
ANION GAP SERPL CALCULATED.3IONS-SCNC: 7 MMOL/L (ref 4–13)
AST SERPL W P-5'-P-CCNC: 24 U/L (ref 5–45)
BASOPHILS # BLD MANUAL: 0 THOUSAND/UL (ref 0–0.1)
BASOPHILS NFR MAR MANUAL: 0 % (ref 0–1)
BILIRUB SERPL-MCNC: 0.8 MG/DL (ref 0.2–1)
BUN SERPL-MCNC: 13 MG/DL (ref 5–25)
CALCIUM SERPL-MCNC: 9.4 MG/DL (ref 8.3–10.1)
CHLORIDE SERPL-SCNC: 101 MMOL/L (ref 100–108)
CO2 SERPL-SCNC: 29 MMOL/L (ref 21–32)
CREAT SERPL-MCNC: 0.82 MG/DL (ref 0.6–1.3)
EOSINOPHIL # BLD MANUAL: 0 THOUSAND/UL (ref 0–0.4)
EOSINOPHIL NFR BLD MANUAL: 0 % (ref 0–6)
ERYTHROCYTE [DISTWIDTH] IN BLOOD BY AUTOMATED COUNT: 15 % (ref 11.6–15.1)
GFR SERPL CREATININE-BSD FRML MDRD: 86 ML/MIN/1.73SQ M
GLUCOSE SERPL-MCNC: 156 MG/DL (ref 65–140)
HCT VFR BLD AUTO: 45.3 % (ref 34.8–46.1)
HGB BLD-MCNC: 13.5 G/DL (ref 11.5–15.4)
LYMPHOCYTES # BLD AUTO: 0.61 THOUSAND/UL (ref 0.6–4.47)
LYMPHOCYTES # BLD AUTO: 9 % (ref 14–44)
MCH RBC QN AUTO: 24.7 PG (ref 26.8–34.3)
MCHC RBC AUTO-ENTMCNC: 29.8 G/DL (ref 31.4–37.4)
MCV RBC AUTO: 83 FL (ref 82–98)
METAMYELOCYTES NFR BLD MANUAL: 1 % (ref 0–1)
MONOCYTES # BLD AUTO: 0.54 THOUSAND/UL (ref 0–1.22)
MONOCYTES NFR BLD: 8 % (ref 4–12)
MYELOCYTES NFR BLD MANUAL: 1 % (ref 0–1)
NEUTROPHILS # BLD MANUAL: 5.13 THOUSAND/UL (ref 1.85–7.62)
NEUTS BAND NFR BLD MANUAL: 3 % (ref 0–8)
NEUTS SEG NFR BLD AUTO: 73 % (ref 43–75)
NRBC BLD AUTO-RTO: 0 /100 WBCS
PLATELET # BLD AUTO: 329 THOUSANDS/UL (ref 149–390)
PLATELET BLD QL SMEAR: ADEQUATE
PMV BLD AUTO: 10.2 FL (ref 8.9–12.7)
POTASSIUM SERPL-SCNC: 3.5 MMOL/L (ref 3.5–5.3)
PROT SERPL-MCNC: 8.3 G/DL (ref 6.4–8.2)
RBC # BLD AUTO: 5.47 MILLION/UL (ref 3.81–5.12)
SODIUM SERPL-SCNC: 137 MMOL/L (ref 136–145)
TOTAL CELLS COUNTED SPEC: 100
VARIANT LYMPHS # BLD AUTO: 5 %
WBC # BLD AUTO: 6.75 THOUSAND/UL (ref 4.31–10.16)

## 2020-04-01 PROCEDURE — 99284 EMERGENCY DEPT VISIT MOD MDM: CPT | Performed by: PHYSICIAN ASSISTANT

## 2020-04-01 PROCEDURE — 99283 EMERGENCY DEPT VISIT LOW MDM: CPT

## 2020-04-01 PROCEDURE — 93005 ELECTROCARDIOGRAM TRACING: CPT

## 2020-04-01 PROCEDURE — 80053 COMPREHEN METABOLIC PANEL: CPT | Performed by: PHYSICIAN ASSISTANT

## 2020-04-01 PROCEDURE — 85027 COMPLETE CBC AUTOMATED: CPT | Performed by: PHYSICIAN ASSISTANT

## 2020-04-01 PROCEDURE — 85007 BL SMEAR W/DIFF WBC COUNT: CPT | Performed by: PHYSICIAN ASSISTANT

## 2020-04-01 PROCEDURE — 36415 COLL VENOUS BLD VENIPUNCTURE: CPT | Performed by: PHYSICIAN ASSISTANT

## 2020-04-01 RX ORDER — ONDANSETRON 4 MG/1
4 TABLET, ORALLY DISINTEGRATING ORAL ONCE
Status: COMPLETED | OUTPATIENT
Start: 2020-04-01 | End: 2020-04-01

## 2020-04-01 RX ORDER — ONDANSETRON 4 MG/1
4 TABLET, FILM COATED ORAL EVERY 6 HOURS PRN
Qty: 21 TABLET | Refills: 0 | Status: SHIPPED | OUTPATIENT
Start: 2020-04-01 | End: 2020-04-08

## 2020-04-01 RX ADMIN — ONDANSETRON 4 MG: 4 TABLET, ORALLY DISINTEGRATING ORAL at 21:49

## 2020-04-02 LAB
ATRIAL RATE: 60 BPM
ATRIAL RATE: 60 BPM
P AXIS: 27 DEGREES
P AXIS: 30 DEGREES
PR INTERVAL: 150 MS
PR INTERVAL: 164 MS
QRS AXIS: 62 DEGREES
QRS AXIS: 65 DEGREES
QRSD INTERVAL: 90 MS
QRSD INTERVAL: 94 MS
QT INTERVAL: 434 MS
QT INTERVAL: 440 MS
QTC INTERVAL: 434 MS
QTC INTERVAL: 440 MS
T WAVE AXIS: 132 DEGREES
T WAVE AXIS: 68 DEGREES
VENTRICULAR RATE: 60 BPM
VENTRICULAR RATE: 60 BPM

## 2020-04-02 PROCEDURE — 93010 ELECTROCARDIOGRAM REPORT: CPT | Performed by: INTERNAL MEDICINE

## 2020-06-22 ENCOUNTER — TRANSCRIBE ORDERS (OUTPATIENT)
Dept: SLEEP CENTER | Facility: CLINIC | Age: 45
End: 2020-06-22

## 2020-06-22 DIAGNOSIS — G47.00 INSOMNIA, UNSPECIFIED TYPE: ICD-10-CM

## 2020-06-22 DIAGNOSIS — R06.81 WITNESSED EPISODE OF APNEA: Primary | ICD-10-CM

## 2020-06-23 ENCOUNTER — TELEPHONE (OUTPATIENT)
Dept: SLEEP CENTER | Facility: CLINIC | Age: 45
End: 2020-06-23

## 2020-07-11 ENCOUNTER — HOSPITAL ENCOUNTER (OUTPATIENT)
Dept: SLEEP CENTER | Facility: CLINIC | Age: 45
Discharge: HOME | End: 2020-07-11
Payer: COMMERCIAL

## 2020-07-11 DIAGNOSIS — R06.81 WITNESSED EPISODE OF APNEA: ICD-10-CM

## 2020-07-11 DIAGNOSIS — G47.00 INSOMNIA, UNSPECIFIED TYPE: ICD-10-CM

## 2020-07-11 PROCEDURE — 95810 POLYSOM 6/> YRS 4/> PARAM: CPT | Performed by: INTERNAL MEDICINE

## 2020-07-11 PROCEDURE — 95810 POLYSOM 6/> YRS 4/> PARAM: CPT

## 2020-07-12 NOTE — PROGRESS NOTES
Sleep Study Documentation    Pre-Sleep Study       Sleep testing procedure explained to patient:YES    Patient napped prior to study:NO    Caffeine:Dayshift worker after 12PM   Caffeine use:YES- coffee  6 ounces    Alcohol:Dayshift workers after 5PM: Alcohol use:NO    Typical day for patient:YES       Study Documentation    Sleep Study Indications: witnessed apneas    Sleep Study: Diagnostic   Snore:none  Supplemental O2: yes  O2 flow rate (L/min) range 1  O2 flow rate (L/min) final 1  O2 flow rate (L/min) range 1 lpm  O2 flow rate (L/min) final 1 lpm  Minimum SaO2 83%  Baseline SaO2 98%        Mode of Therapy:n/a    EKG abnormalities: no     EEG abnormalities: no    Sleep Study Recorded < 2 hours: N/A    Sleep Study Recorded > 2 hours but incomplete study: N/A    Sleep Study Recorded 6 hours but no sleep obtained: NO    Patient classification: unemployed       Post-Sleep Study    Medication used at bedtime or during sleep study:YES other prescription medications    Patient reports time it took to fall asleep:20 to 30 minutes    Patient reports waking up during study:1 to 2 times  Patient reports returning to sleep in 10 to 30 minutes  Patient reports sleeping 6 to 8 hours with dreaming  Patient reports sleep during study:better than usual    Patient rated sleepiness: Very sleepy or tired    PAP treatment:no

## 2020-07-28 ENCOUNTER — TELEPHONE (OUTPATIENT)
Dept: SLEEP CENTER | Facility: CLINIC | Age: 45
End: 2020-07-28

## 2020-07-28 NOTE — TELEPHONE ENCOUNTER
Left message for the patient to call back for sleep study results  No PETER but has hypoxia was placed on 1 liter O2 during study    Patient needs consult with Dr Viet Hoyt

## 2020-08-04 ENCOUNTER — TELEPHONE (OUTPATIENT)
Dept: OTHER | Facility: OTHER | Age: 45
End: 2020-08-04

## 2020-08-27 ENCOUNTER — TRANSCRIBE ORDERS (OUTPATIENT)
Dept: SLEEP CENTER | Facility: CLINIC | Age: 45
End: 2020-08-27

## 2020-08-27 DIAGNOSIS — G47.00 INSOMNIA, UNSPECIFIED: Primary | ICD-10-CM

## 2020-08-27 DIAGNOSIS — R06.81 APNEA, NOT ELSEWHERE CLASSIFIED: ICD-10-CM

## 2020-09-02 ENCOUNTER — TELEPHONE (OUTPATIENT)
Dept: SLEEP CENTER | Facility: CLINIC | Age: 45
End: 2020-09-02

## 2020-09-22 ENCOUNTER — TELEPHONE (OUTPATIENT)
Dept: OTHER | Facility: OTHER | Age: 45
End: 2020-09-22

## 2021-06-22 ENCOUNTER — HOSPITAL ENCOUNTER (EMERGENCY)
Facility: HOSPITAL | Age: 46
Discharge: HOME | End: 2021-06-22
Attending: EMERGENCY MEDICINE | Admitting: EMERGENCY MEDICINE
Payer: COMMERCIAL

## 2021-06-22 VITALS
DIASTOLIC BLOOD PRESSURE: 69 MMHG | BODY MASS INDEX: 47.8 KG/M2 | HEIGHT: 64 IN | TEMPERATURE: 97.1 F | RESPIRATION RATE: 20 BRPM | OXYGEN SATURATION: 99 % | WEIGHT: 280 LBS | SYSTOLIC BLOOD PRESSURE: 125 MMHG | HEART RATE: 65 BPM

## 2021-06-22 DIAGNOSIS — R22.0 FACIAL SWELLING: ICD-10-CM

## 2021-06-22 DIAGNOSIS — K04.7 DENTAL ABSCESS: Primary | ICD-10-CM

## 2021-06-22 PROCEDURE — 99284 EMERGENCY DEPT VISIT MOD MDM: CPT | Performed by: EMERGENCY MEDICINE

## 2021-06-22 PROCEDURE — 64450 NJX AA&/STRD OTHER PN/BRANCH: CPT | Performed by: EMERGENCY MEDICINE

## 2021-06-22 PROCEDURE — 99282 EMERGENCY DEPT VISIT SF MDM: CPT

## 2021-06-22 RX ORDER — CLINDAMYCIN HYDROCHLORIDE 300 MG/1
300 CAPSULE ORAL 3 TIMES DAILY
Qty: 30 CAPSULE | Refills: 0 | Status: SHIPPED | OUTPATIENT
Start: 2021-06-22 | End: 2021-07-02

## 2021-06-22 RX ORDER — CLINDAMYCIN HYDROCHLORIDE 150 MG/1
300 CAPSULE ORAL ONCE
Status: COMPLETED | OUTPATIENT
Start: 2021-06-22 | End: 2021-06-22

## 2021-06-22 RX ORDER — BUPIVACAINE HYDROCHLORIDE AND EPINEPHRINE 5; 5 MG/ML; UG/ML
1.8 INJECTION, SOLUTION EPIDURAL; INTRACAUDAL; PERINEURAL ONCE
Status: COMPLETED | OUTPATIENT
Start: 2021-06-22 | End: 2021-06-22

## 2021-06-22 RX ADMIN — CLINDAMYCIN HYDROCHLORIDE 300 MG: 150 CAPSULE ORAL at 11:44

## 2021-06-22 RX ADMIN — BUPIVACAINE HYDROCHLORIDE AND EPINEPHRINE BITARTRATE 1.8 ML: 5; .005 INJECTION, SOLUTION SUBCUTANEOUS at 11:45

## 2021-06-22 NOTE — ED PROVIDER NOTES
History  Chief Complaint   Patient presents with    Dental Swelling     woke up with R sided facial swelling after toothache      HPI    Prior to Admission Medications   Prescriptions Last Dose Informant Patient Reported? Taking? albuterol (PROVENTIL HFA,VENTOLIN HFA) 90 mcg/act inhaler   No No   Sig: Inhale 2 puffs every 6 (six) hours as needed for wheezing or shortness of breath   fluticasone (FLONASE) 50 mcg/act nasal spray   No No   Si sprays into each nostril daily As needed for sinus congestion   methocarbamol (ROBAXIN) 500 mg tablet   No No   Sig: Take 1 tablet (500 mg total) by mouth 2 (two) times a day   ondansetron (ZOFRAN) 4 mg tablet   No No   Sig: Take 1 tablet (4 mg total) by mouth every 6 (six) hours as needed for nausea or vomiting (for nausea) for up to 7 days   predniSONE 20 mg tablet   No No   Sig: Take 1 tablet (20 mg total) by mouth daily Take 3 tabs for 3 days, then 2 tabs for 3 days, and 1 tab for last 3 days  Facility-Administered Medications: None       Past Medical History:   Diagnosis Date    Asthma     Hypertension        Past Surgical History:   Procedure Laterality Date     SECTION         No family history on file  I have reviewed and agree with the history as documented  E-Cigarette/Vaping     E-Cigarette/Vaping Substances     Social History     Tobacco Use    Smoking status: Former Smoker    Smokeless tobacco: Never Used   Substance Use Topics    Alcohol use: No    Drug use: No       Review of Systems    Physical Exam  Physical Exam  Vitals and nursing note reviewed  Constitutional:       General: She is not in acute distress  Appearance: She is well-developed  She is morbidly obese  HENT:      Head: Normocephalic and atraumatic  Jaw: No trismus  Comments: Mild right-sided facial swelling  No sublingual fullness or tongue elevation    No submandibular swelling     Mouth/Throat:     Eyes:      Conjunctiva/sclera: Conjunctivae normal  Pupils: Pupils are equal, round, and reactive to light  Neck:      Trachea: Phonation normal  No tracheal deviation  Cardiovascular:      Rate and Rhythm: Normal rate and regular rhythm  Pulses:           Radial pulses are 2+ on the left side  Pulmonary:      Effort: Pulmonary effort is normal  No respiratory distress  Breath sounds: No stridor  Musculoskeletal:      Cervical back: Normal range of motion  Lymphadenopathy:      Cervical: No cervical adenopathy  Skin:     General: Skin is warm and dry  Neurological:      Mental Status: She is alert and oriented to person, place, and time  GCS: GCS eye subscore is 4  GCS verbal subscore is 5  GCS motor subscore is 6  Psychiatric:         Behavior: Behavior normal          Vital Signs  ED Triage Vitals   Temperature Pulse Respirations Blood Pressure SpO2   06/22/21 1122 06/22/21 1122 06/22/21 1122 06/22/21 1122 06/22/21 1122   (!) 97 1 °F (36 2 °C) 65 20 125/69 99 %      Temp Source Heart Rate Source Patient Position - Orthostatic VS BP Location FiO2 (%)   06/22/21 1122 06/22/21 1122 06/22/21 1122 06/22/21 1122 --   Oral Monitor Lying Right arm       Pain Score       06/22/21 1141       Worst Possible Pain           Vitals:    06/22/21 1122   BP: 125/69   Pulse: 65   Patient Position - Orthostatic VS: Lying         Visual Acuity      ED Medications  Medications   bupivacaine-epinephrine (PF) (MARCAINE/EPINEPHRINE PF) 0 5 %-1:[id number] injection 1 8 mL (1 8 mL Injection Given 6/22/21 1145)   clindamycin (CLEOCIN) capsule 300 mg (300 mg Oral Given 6/22/21 1144)       Diagnostic Studies  Results Reviewed     None                 No orders to display              Procedures  Nerve block    Date/Time: 6/22/2021 11:48 AM  Performed by: Danica Melvin MD  Authorized by: Danica Melvin MD     Patient location:  Bedside  [place] Protocol:  Consent: Verbal consent obtained    Risks and benefits: risks, benefits and alternatives were discussed  Consent given by: patient      Indications:     Indications:  Pain relief  Location:     Body area:  Head    Head nerve blocked: inferior alveolar  Nerve type:  Peripheral    Laterality:  Right  Procedure details (see MAR for exact dosages): Block needle gauge:  27 G    Anesthetic injected:  Bupivacaine 0 5% WITH epi    Steroid injected:  None    Additive injected:  None    Injection procedure:  Anatomic landmarks palpated, anatomic landmarks identified, incremental injection, introduced needle and negative aspiration for blood  Post-procedure details:     Dressing:  None    Outcome:  Pain improved    Patient tolerance of procedure: Tolerated well, no immediate complications             ED Course                                           MDM  Number of Diagnoses or Management Options  Dental abscess: new and does not require workup  Facial swelling: new and does not require workup  Diagnosis management comments: This is a 78-year-old female with no significant past medical history who presents here today for evaluation of dental pain and facial swelling  She states she has problems with all of her lower teeth, and has been told that the remaining teeth need to be extracted  She was given a referral by her general dentist pre COVID, which was the last time she saw a dentist   However, due to [facility] related closures and then insurance issues, she has been unable to see anybody to have the extraction performed  She says this is the first time that she has had problems with this particular tooth  She denies any trauma to the tooth  She states she woke up this morning and that area of her face was swollen  She denies any fevers, systemic symptoms, trismus, difficulty speaking or swallowing  She did take Tylenol yesterday in use campus all which did transiently help the pain  She denies prior similar symptoms  She has no underlying medical problems      Review of systems: Otherwise negative unless stated above    She is well-appearing, in no acute distress  She has mild swelling of the right cheek  She is missing multiple teeth along the lower jaw, with multiple cavities and several cracked teeth  She has tenderness along the lower gumline, without swelling or induration  There is mild facial swelling  She has no trismus, adenopathy, tongue elevation, signs of deep space abscess or Red's angina  Exam is otherwise unremarkable  I discussed the patient findings, treatment antibiotics, as well as dentistry follow-up to ensure improvement of current infection as well as discussion of extraction  There are currently no signs of deep space abscess or airway compromise to suggest need for imaging or admission for IV antibiotics  I discussed with her pain control, including dental block, and she elects to have this performed  I discussed indications for return, and she expresses understanding with this plan  Disposition  Final diagnoses:   Dental abscess   Facial swelling     Time reflects when diagnosis was documented in both MDM as applicable and the Disposition within this note     Time User Action Codes Description Comment    6/22/2021 11:56 AM Saurabh Vasquez Add [K04 7] Dental abscess     6/22/2021 11:56 AM Saurabh Vasquez Add [R22 0] Facial swelling       ED Disposition     ED Disposition Condition Date/Time Comment    Discharge Good Tue Jun 22, 2021 11:56 AM Kitty Morning discharge to home/self care        Follow-up Information     Follow up With Specialties Details Why Contact Info Additional [address]  for Oral and Maxillofacial Surgery Sam  Schedule an appointment as soon as possible for a visit  to discuss dental extraction [address]  [facility] and [address] [address]  Schedule an appointment as soon as possible for a visit  to follow up, and discuss extraction [address] [address]  [address] [facility] Schedule an appointment as soon as possible for a visit  to follow up, and discuss extraction [place] [id number]  [facility], [address] [place] [address], [id number], [phone]    dental clinics  Schedule an appointment as soon as possible for a visit  to follow up on your tooth            Patient's Medications   Discharge Prescriptions    AL MAG OXIDE-DIPHENHYDRAMINE-LIDOCAINE VISCOUS (MAGIC MOUTHWASH) 1:1:1 SUSPENSION    Swish and spit 10 mL every 4 (four) hours as needed for mouth pain or discomfort       Start Date: 6/22/2021 End Date: --       Order Dose: 10 mL       Quantity: 180 mL    Refills: 0    CLINDAMYCIN (CLEOCIN) 300 MG CAPSULE    Take 1 capsule (300 mg total) by mouth 3 (three) times a day for 10 days       Start Date: 6/22/2021 End Date: 7/2/2021       Order Dose: 300 mg       Quantity: 30 capsule    Refills: 0     No discharge procedures on file      PDMP Review     None          ED Provider  Electronically Signed by           Berkley James MD  06/22/21 3420

## 2021-06-22 NOTE — DISCHARGE INSTRUCTIONS
Take the antibiotics as prescribed  Use ibuprofen (Motrin, Advil) or acetaminophen (Tylenol) as needed for pain, as per the instructions  Use ice or heat to the outside of your mouth to help with pain  Use topical pain medications, such as Anbesol or Orajel to help with the pain, as well as the Magic mouthwash  Do not use the medications more than advised on the packaging  Follow-up closely with dentist to ensure your to better, as well as for discussion extraction  Return if he developed fevers, difficulties opening your mouth, worsening swelling, difficulties speaking or swallowing, or for any other concerns

## 2021-10-01 ENCOUNTER — TRANSCRIBE ORDERS (OUTPATIENT)
Dept: LAB | Facility: CLINIC | Age: 46
End: 2021-10-01

## 2021-10-01 ENCOUNTER — APPOINTMENT (OUTPATIENT)
Dept: LAB | Facility: CLINIC | Age: 46
End: 2021-10-01
Payer: COMMERCIAL

## 2021-10-01 DIAGNOSIS — E11.69 TYPE 2 DIABETES MELLITUS WITH OTHER SPECIFIED COMPLICATION, UNSPECIFIED WHETHER LONG TERM INSULIN USE (HCC): ICD-10-CM

## 2021-10-01 DIAGNOSIS — E78.5 HYPERLIPIDEMIA, UNSPECIFIED HYPERLIPIDEMIA TYPE: ICD-10-CM

## 2021-10-01 DIAGNOSIS — R53.83 OTHER FATIGUE: ICD-10-CM

## 2021-10-01 DIAGNOSIS — D50.9 IRON DEFICIENCY ANEMIA, UNSPECIFIED IRON DEFICIENCY ANEMIA TYPE: ICD-10-CM

## 2021-10-01 DIAGNOSIS — R77.9 ABNORMALITY OF PLASMA PROTEIN: Primary | ICD-10-CM

## 2021-10-01 DIAGNOSIS — R77.9 ABNORMALITY OF PLASMA PROTEIN: ICD-10-CM

## 2021-10-01 LAB
ALBUMIN SERPL BCP-MCNC: 2.7 G/DL (ref 3.5–5)
ALP SERPL-CCNC: 78 U/L (ref 46–116)
ALT SERPL W P-5'-P-CCNC: 16 U/L (ref 12–78)
ANION GAP SERPL CALCULATED.3IONS-SCNC: 5 MMOL/L (ref 4–13)
AST SERPL W P-5'-P-CCNC: 12 U/L (ref 5–45)
BILIRUB SERPL-MCNC: 0.62 MG/DL (ref 0.2–1)
BUN SERPL-MCNC: 6 MG/DL (ref 5–25)
CALCIUM ALBUM COR SERPL-MCNC: 9.6 MG/DL (ref 8.3–10.1)
CALCIUM SERPL-MCNC: 8.6 MG/DL (ref 8.3–10.1)
CHLORIDE SERPL-SCNC: 106 MMOL/L (ref 100–108)
CHOLEST SERPL-MCNC: 145 MG/DL (ref 50–200)
CO2 SERPL-SCNC: 27 MMOL/L (ref 21–32)
CREAT SERPL-MCNC: 0.69 MG/DL (ref 0.6–1.3)
ERYTHROCYTE [DISTWIDTH] IN BLOOD BY AUTOMATED COUNT: 13.9 % (ref 11.6–15.1)
EST. AVERAGE GLUCOSE BLD GHB EST-MCNC: 140 MG/DL
GFR SERPL CREATININE-BSD FRML MDRD: 103 ML/MIN/1.73SQ M
GLUCOSE P FAST SERPL-MCNC: 159 MG/DL (ref 65–99)
HBA1C MFR BLD: 6.5 %
HCT VFR BLD AUTO: 39.8 % (ref 34.8–46.1)
HDLC SERPL-MCNC: 57 MG/DL
HGB BLD-MCNC: 12 G/DL (ref 11.5–15.4)
LDLC SERPL CALC-MCNC: 68 MG/DL (ref 0–100)
MCH RBC QN AUTO: 26.4 PG (ref 26.8–34.3)
MCHC RBC AUTO-ENTMCNC: 30.2 G/DL (ref 31.4–37.4)
MCV RBC AUTO: 88 FL (ref 82–98)
NONHDLC SERPL-MCNC: 88 MG/DL
PLATELET # BLD AUTO: 337 THOUSANDS/UL (ref 149–390)
PMV BLD AUTO: 10.7 FL (ref 8.9–12.7)
POTASSIUM SERPL-SCNC: 3.2 MMOL/L (ref 3.5–5.3)
PROT SERPL-MCNC: 7.2 G/DL (ref 6.4–8.2)
RBC # BLD AUTO: 4.54 MILLION/UL (ref 3.81–5.12)
SODIUM SERPL-SCNC: 138 MMOL/L (ref 136–145)
TRIGL SERPL-MCNC: 100 MG/DL
TSH SERPL DL<=0.05 MIU/L-ACNC: 0.97 UIU/ML (ref 0.36–3.74)
VIT B12 SERPL-MCNC: 429 PG/ML (ref 100–900)
WBC # BLD AUTO: 5.76 THOUSAND/UL (ref 4.31–10.16)

## 2021-10-01 PROCEDURE — 84166 PROTEIN E-PHORESIS/URINE/CSF: CPT | Performed by: PATHOLOGY

## 2021-10-01 PROCEDURE — 80053 COMPREHEN METABOLIC PANEL: CPT

## 2021-10-01 PROCEDURE — 80061 LIPID PANEL: CPT

## 2021-10-01 PROCEDURE — 84165 PROTEIN E-PHORESIS SERUM: CPT

## 2021-10-01 PROCEDURE — 82607 VITAMIN B-12: CPT

## 2021-10-01 PROCEDURE — 84443 ASSAY THYROID STIM HORMONE: CPT

## 2021-10-01 PROCEDURE — 86618 LYME DISEASE ANTIBODY: CPT

## 2021-10-01 PROCEDURE — 85027 COMPLETE CBC AUTOMATED: CPT

## 2021-10-01 PROCEDURE — 84166 PROTEIN E-PHORESIS/URINE/CSF: CPT

## 2021-10-01 PROCEDURE — 83036 HEMOGLOBIN GLYCOSYLATED A1C: CPT

## 2021-10-01 PROCEDURE — 84165 PROTEIN E-PHORESIS SERUM: CPT | Performed by: PATHOLOGY

## 2021-10-01 PROCEDURE — 36415 COLL VENOUS BLD VENIPUNCTURE: CPT

## 2021-10-02 LAB — B BURGDOR IGG+IGM SER-ACNC: 28

## 2021-10-05 LAB
ALBUMIN SERPL ELPH-MCNC: 3.53 G/DL (ref 3.5–5)
ALBUMIN SERPL ELPH-MCNC: 51.9 % (ref 52–65)
ALBUMIN UR ELPH-MCNC: 100 %
ALPHA1 GLOB MFR UR ELPH: 0 %
ALPHA1 GLOB SERPL ELPH-MCNC: 0.3 G/DL (ref 0.1–0.4)
ALPHA1 GLOB SERPL ELPH-MCNC: 4.4 % (ref 2.5–5)
ALPHA2 GLOB MFR UR ELPH: 0 %
ALPHA2 GLOB SERPL ELPH-MCNC: 0.67 G/DL (ref 0.4–1.2)
ALPHA2 GLOB SERPL ELPH-MCNC: 9.9 % (ref 7–13)
B-GLOBULIN MFR UR ELPH: 0 %
BETA GLOB ABNORMAL SERPL ELPH-MCNC: 0.5 G/DL (ref 0.4–0.8)
BETA1 GLOB SERPL ELPH-MCNC: 7.4 % (ref 5–13)
BETA2 GLOB SERPL ELPH-MCNC: 6.9 % (ref 2–8)
BETA2+GAMMA GLOB SERPL ELPH-MCNC: 0.47 G/DL (ref 0.2–0.5)
GAMMA GLOB ABNORMAL SERPL ELPH-MCNC: 1.33 G/DL (ref 0.5–1.6)
GAMMA GLOB MFR UR ELPH: 0 %
GAMMA GLOB SERPL ELPH-MCNC: 19.5 % (ref 12–22)
IGG/ALB SER: 1.08 {RATIO} (ref 1.1–1.8)
PROT PATTERN SERPL ELPH-IMP: ABNORMAL
PROT PATTERN UR ELPH-IMP: ABNORMAL
PROT SERPL-MCNC: 6.8 G/DL (ref 6.4–8.2)
PROT UR-MCNC: 22 MG/DL

## 2022-04-07 ENCOUNTER — APPOINTMENT (OUTPATIENT)
Dept: LAB | Facility: CLINIC | Age: 47
End: 2022-04-07
Payer: COMMERCIAL

## 2022-04-07 DIAGNOSIS — R53.83 OTHER FATIGUE: ICD-10-CM

## 2022-04-07 DIAGNOSIS — E13.9 DIABETES MELLITUS OF OTHER TYPE WITHOUT COMPLICATION, UNSPECIFIED WHETHER LONG TERM INSULIN USE (HCC): ICD-10-CM

## 2022-04-07 DIAGNOSIS — E78.5 HYPERLIPIDEMIA, UNSPECIFIED HYPERLIPIDEMIA TYPE: ICD-10-CM

## 2022-04-07 DIAGNOSIS — I51.9 MYXEDEMA HEART DISEASE: ICD-10-CM

## 2022-04-07 DIAGNOSIS — R11.2 NAUSEA AND VOMITING, UNSPECIFIED VOMITING TYPE: ICD-10-CM

## 2022-04-07 DIAGNOSIS — E03.9 MYXEDEMA HEART DISEASE: ICD-10-CM

## 2022-04-07 DIAGNOSIS — E55.9 AVITAMINOSIS D: ICD-10-CM

## 2022-04-07 DIAGNOSIS — D64.9 ANEMIA, UNSPECIFIED TYPE: ICD-10-CM

## 2022-04-07 LAB
25(OH)D3 SERPL-MCNC: 36.5 NG/ML (ref 30–100)
ALBUMIN SERPL BCP-MCNC: 3.4 G/DL (ref 3.5–5)
ALP SERPL-CCNC: 82 U/L (ref 46–116)
ALT SERPL W P-5'-P-CCNC: 14 U/L (ref 12–78)
AMYLASE SERPL-CCNC: 31 IU/L (ref 25–115)
ANION GAP SERPL CALCULATED.3IONS-SCNC: 4 MMOL/L (ref 4–13)
AST SERPL W P-5'-P-CCNC: 10 U/L (ref 5–45)
BILIRUB SERPL-MCNC: 0.82 MG/DL (ref 0.2–1)
BUN SERPL-MCNC: 11 MG/DL (ref 5–25)
CALCIUM ALBUM COR SERPL-MCNC: 9.4 MG/DL (ref 8.3–10.1)
CALCIUM SERPL-MCNC: 8.9 MG/DL (ref 8.3–10.1)
CHLORIDE SERPL-SCNC: 112 MMOL/L (ref 100–108)
CHOLEST SERPL-MCNC: 149 MG/DL
CO2 SERPL-SCNC: 24 MMOL/L (ref 21–32)
CREAT SERPL-MCNC: 0.7 MG/DL (ref 0.6–1.3)
ERYTHROCYTE [DISTWIDTH] IN BLOOD BY AUTOMATED COUNT: 15.6 % (ref 11.6–15.1)
EST. AVERAGE GLUCOSE BLD GHB EST-MCNC: 140 MG/DL
GFR SERPL CREATININE-BSD FRML MDRD: 102 ML/MIN/1.73SQ M
GLUCOSE P FAST SERPL-MCNC: 83 MG/DL (ref 65–99)
HBA1C MFR BLD: 6.5 %
HCT VFR BLD AUTO: 42.7 % (ref 34.8–46.1)
HDLC SERPL-MCNC: 58 MG/DL
HGB BLD-MCNC: 13.3 G/DL (ref 11.5–15.4)
LDLC SERPL CALC-MCNC: 76 MG/DL (ref 0–100)
LIPASE SERPL-CCNC: 53 U/L (ref 73–393)
MCH RBC QN AUTO: 26.7 PG (ref 26.8–34.3)
MCHC RBC AUTO-ENTMCNC: 31.1 G/DL (ref 31.4–37.4)
MCV RBC AUTO: 86 FL (ref 82–98)
NONHDLC SERPL-MCNC: 91 MG/DL
PLATELET # BLD AUTO: 377 THOUSANDS/UL (ref 149–390)
PMV BLD AUTO: 10.9 FL (ref 8.9–12.7)
POTASSIUM SERPL-SCNC: 3.9 MMOL/L (ref 3.5–5.3)
PROT SERPL-MCNC: 7.7 G/DL (ref 6.4–8.2)
RBC # BLD AUTO: 4.98 MILLION/UL (ref 3.81–5.12)
SODIUM SERPL-SCNC: 140 MMOL/L (ref 136–145)
T4 FREE SERPL-MCNC: 1.18 NG/DL (ref 0.76–1.46)
TRIGL SERPL-MCNC: 77 MG/DL
TSH SERPL DL<=0.05 MIU/L-ACNC: 0.65 UIU/ML (ref 0.45–4.5)
VIT B12 SERPL-MCNC: 295 PG/ML (ref 100–900)
WBC # BLD AUTO: 8.74 THOUSAND/UL (ref 4.31–10.16)

## 2022-04-07 PROCEDURE — 82306 VITAMIN D 25 HYDROXY: CPT

## 2022-04-07 PROCEDURE — 36415 COLL VENOUS BLD VENIPUNCTURE: CPT

## 2022-04-07 PROCEDURE — 80061 LIPID PANEL: CPT

## 2022-04-07 PROCEDURE — 82607 VITAMIN B-12: CPT

## 2022-04-07 PROCEDURE — 82150 ASSAY OF AMYLASE: CPT

## 2022-04-07 PROCEDURE — 84439 ASSAY OF FREE THYROXINE: CPT

## 2022-04-07 PROCEDURE — 83036 HEMOGLOBIN GLYCOSYLATED A1C: CPT

## 2022-04-07 PROCEDURE — 86618 LYME DISEASE ANTIBODY: CPT

## 2022-04-07 PROCEDURE — 80053 COMPREHEN METABOLIC PANEL: CPT

## 2022-04-07 PROCEDURE — 84443 ASSAY THYROID STIM HORMONE: CPT

## 2022-04-07 PROCEDURE — 85027 COMPLETE CBC AUTOMATED: CPT

## 2022-04-07 PROCEDURE — 83690 ASSAY OF LIPASE: CPT

## 2022-04-08 LAB — B BURGDOR IGG+IGM SER-ACNC: 16

## 2022-04-28 ENCOUNTER — HOSPITAL ENCOUNTER (EMERGENCY)
Facility: HOSPITAL | Age: 47
Discharge: HOME | End: 2022-04-28
Attending: EMERGENCY MEDICINE
Payer: COMMERCIAL

## 2022-04-28 ENCOUNTER — APPOINTMENT (EMERGENCY)
Dept: CT IMAGING | Facility: HOSPITAL | Age: 47
End: 2022-04-28
Payer: COMMERCIAL

## 2022-04-28 VITALS
OXYGEN SATURATION: 97 % | TEMPERATURE: 97.9 F | SYSTOLIC BLOOD PRESSURE: 170 MMHG | RESPIRATION RATE: 18 BRPM | HEART RATE: 93 BPM | DIASTOLIC BLOOD PRESSURE: 94 MMHG

## 2022-04-28 DIAGNOSIS — M54.9 BACK PAIN: Primary | ICD-10-CM

## 2022-04-28 DIAGNOSIS — N12 PYELONEPHRITIS: ICD-10-CM

## 2022-04-28 LAB
BACTERIA UR QL AUTO: ABNORMAL /HPF
BILIRUB UR QL STRIP: NEGATIVE
CLARITY UR: CLEAR
COLOR UR: YELLOW
EXT PREG TEST URINE: NEGATIVE
EXT. CONTROL ED NAV: NORMAL
GLUCOSE UR STRIP-MCNC: NEGATIVE MG/DL
HGB UR QL STRIP.AUTO: NEGATIVE
KETONES UR STRIP-MCNC: NEGATIVE MG/DL
LEUKOCYTE ESTERASE UR QL STRIP: NEGATIVE
NITRITE UR QL STRIP: POSITIVE
NON-SQ EPI CELLS URNS QL MICRO: ABNORMAL /HPF
PH UR STRIP.AUTO: 7 [PH]
PROT UR STRIP-MCNC: NEGATIVE MG/DL
RBC #/AREA URNS AUTO: ABNORMAL /HPF
SP GR UR STRIP.AUTO: 1.02 (ref 1–1.03)
UROBILINOGEN UR QL STRIP.AUTO: 0.2 E.U./DL
WBC #/AREA URNS AUTO: ABNORMAL /HPF

## 2022-04-28 PROCEDURE — 99284 EMERGENCY DEPT VISIT MOD MDM: CPT

## 2022-04-28 PROCEDURE — 99285 EMERGENCY DEPT VISIT HI MDM: CPT | Performed by: EMERGENCY MEDICINE

## 2022-04-28 PROCEDURE — G1004 CDSM NDSC: HCPCS

## 2022-04-28 PROCEDURE — 81025 URINE PREGNANCY TEST: CPT | Performed by: EMERGENCY MEDICINE

## 2022-04-28 PROCEDURE — 96372 THER/PROPH/DIAG INJ SC/IM: CPT

## 2022-04-28 PROCEDURE — 81001 URINALYSIS AUTO W/SCOPE: CPT | Performed by: EMERGENCY MEDICINE

## 2022-04-28 PROCEDURE — 74176 CT ABD & PELVIS W/O CONTRAST: CPT

## 2022-04-28 RX ORDER — SULFAMETHOXAZOLE AND TRIMETHOPRIM 800; 160 MG/1; MG/1
1 TABLET ORAL 2 TIMES DAILY
Qty: 14 TABLET | Refills: 0 | Status: SHIPPED | OUTPATIENT
Start: 2022-04-28 | End: 2022-05-05

## 2022-04-28 RX ORDER — MORPHINE SULFATE 10 MG/ML
6 INJECTION, SOLUTION INTRAMUSCULAR; INTRAVENOUS ONCE
Status: COMPLETED | OUTPATIENT
Start: 2022-04-28 | End: 2022-04-28

## 2022-04-28 RX ADMIN — MORPHINE SULFATE 6 MG: 10 INJECTION INTRAVENOUS at 17:21

## 2022-04-28 NOTE — ED PROVIDER NOTES
History  Chief Complaint   Patient presents with    Back Pain     Pt reports L sided back pain that radiates to front of abdomen and down L leg  Denies urinary issues     49 yo female with 2 days of L flank pain  Started suddenly  Worse with movement and bending  Not alleviated w rest  No urinary sxs  No fever or chills  No cough or sob or chest pain  No leg pain/weakness/numbness  No incontinence  No fall or trauma  Prior to Admission Medications   Prescriptions Last Dose Informant Patient Reported? Taking? al mag oxide-diphenhydramine-lidocaine viscous (MAGIC MOUTHWASH) 1:1:1 suspension   No No   Sig: Swish and spit 10 mL every 4 (four) hours as needed for mouth pain or discomfort   albuterol (PROVENTIL HFA,VENTOLIN HFA) 90 mcg/act inhaler   No No   Sig: Inhale 2 puffs every 6 (six) hours as needed for wheezing or shortness of breath   fluticasone (FLONASE) 50 mcg/act nasal spray   No No   Si sprays into each nostril daily As needed for sinus congestion   methocarbamol (ROBAXIN) 500 mg tablet   No No   Sig: Take 1 tablet (500 mg total) by mouth 2 (two) times a day   ondansetron (ZOFRAN) 4 mg tablet   No No   Sig: Take 1 tablet (4 mg total) by mouth every 6 (six) hours as needed for nausea or vomiting (for nausea) for up to 7 days   predniSONE 20 mg tablet   No No   Sig: Take 1 tablet (20 mg total) by mouth daily Take 3 tabs for 3 days, then 2 tabs for 3 days, and 1 tab for last 3 days  Facility-Administered Medications: None       Past Medical History:   Diagnosis Date    Asthma     Hypertension        Past Surgical History:   Procedure Laterality Date     SECTION         History reviewed  No pertinent family history  I have reviewed and agree with the history as documented      E-Cigarette/Vaping     E-Cigarette/Vaping Substances     Social History     Tobacco Use    Smoking status: Former Smoker    Smokeless tobacco: Never Used   Substance Use Topics    Alcohol use: No    Drug use: No       Review of Systems   Musculoskeletal: Positive for back pain  All other systems reviewed and are negative  Physical Exam  Physical Exam  Vitals and nursing note reviewed  Constitutional:       General: She is not in acute distress  Appearance: Normal appearance  She is well-developed  She is not ill-appearing, toxic-appearing or diaphoretic  HENT:      Head: Normocephalic and atraumatic  Eyes:      Conjunctiva/sclera: Conjunctivae normal       Pupils: Pupils are equal, round, and reactive to light  Neck:      Vascular: No JVD  Cardiovascular:      Rate and Rhythm: Normal rate and regular rhythm  Pulses: Normal pulses  Heart sounds: Normal heart sounds  Pulmonary:      Effort: Pulmonary effort is normal       Breath sounds: Normal breath sounds  No stridor  Abdominal:      General: There is no distension  Palpations: Abdomen is soft  Tenderness: There is no abdominal tenderness  There is no guarding or rebound  Musculoskeletal:         General: No tenderness or deformity  Normal range of motion  Cervical back: Normal range of motion and neck supple  Skin:     General: Skin is warm and dry  Capillary Refill: Capillary refill takes less than 2 seconds  Coloration: Skin is not pale  Findings: No erythema or rash  Neurological:      Mental Status: She is alert and oriented to person, place, and time  Cranial Nerves: No cranial nerve deficit  Sensory: No sensory deficit  Motor: No abnormal muscle tone        Coordination: Coordination normal          Vital Signs  ED Triage Vitals   Temperature Pulse Respirations Blood Pressure SpO2   04/28/22 1451 04/28/22 1451 04/28/22 1451 04/28/22 1451 04/28/22 1451   97 9 °F (36 6 °C) 63 20 136/89 97 %      Temp Source Heart Rate Source Patient Position - Orthostatic VS BP Location FiO2 (%)   04/28/22 1451 04/28/22 1451 04/28/22 1451 04/28/22 1451 --   Oral Monitor Lying Right arm Pain Score       22 1721       8           Vitals:    22 1451 22 1730   BP: 136/89 170/94   Pulse: 63 93   Patient Position - Orthostatic VS: Lying          Visual Acuity      ED Medications  Medications   morphine (PF) 10 mg/mL injection 6 mg (6 mg Intramuscular Given 22 1721)       Diagnostic Studies  Results Reviewed     Procedure Component Value Units Date/Time    Urine Microscopic [[id number]]  (Abnormal) Collected: 22    Lab Status: Final result Specimen: Urine, Clean Catch Updated: 22 170     RBC, UA None Seen /hpf      WBC, UA 2-4 /hpf      Epithelial Cells Occasional /hpf      Bacteria, UA Innumerable /hpf     UA (URINE) with reflex to Scope [[id number]]  (Abnormal) Collected: 22    Lab Status: Final result Specimen: Urine, Clean Catch Updated: 22 162     Color, UA Yellow     Clarity, UA Clear     Specific Haigler, UA 1 020     pH, UA 7 0     Leukocytes, UA Negative     Nitrite, UA Positive     Protein, UA Negative mg/dl      Glucose, UA Negative mg/dl      Ketones, UA Negative mg/dl      Urobilinogen, UA 0 2 E U /dl      Bilirubin, UA Negative     Blood, UA Negative    POCT pregnancy, urine [[id number]]  (Normal) Resulted: 22    Lab Status: Final result Updated: 22     EXT PREG TEST UR (Ref: Negative) negative     Control valid                 CT abdomen pelvis wo contrast   Final Result by Smitha Khanna MD ([id number])      1  Tiny right kidney midpole nonobstructing calculus  2   Deformed anterior wall of the uterus tethered to the ventral abdominal fascia  most likely sequela of prior   Ultrasound would be helpful to further assess this region              Workstation performed: [id number]                    Procedures  Procedures         ED Course                                             MDM    Disposition  Final diagnoses:   Back pain   Pyelonephritis     Time reflects when diagnosis was documented in both MDM as applicable and the Disposition within this note     Time User Action Codes Description Comment    4/28/2022  5:11 PM Kar Six Add [M54 9] Back pain     4/28/2022  5:12 PM Kar Briggs Add [N12] Pyelonephritis       ED Disposition     ED Disposition Condition Date/Time Comment    Discharge Stable Thu Apr 28, 2022  5:37 PM Andrew Valencia discharge to home/self care              Follow-up Information     Follow up With Specialties Details Why Contact Info Additional Information    [address] [facility] Emergency Department Emergency Medicine  If symptoms worsen [address] Emergency Department, [address] [place] [address], [address]          Discharge Medication List as of 4/28/2022  5:37 PM      START taking these medications    Details   sulfamethoxazole-trimethoprim (BACTRIM DS) 800-160 mg per tablet Take 1 tablet by mouth 2 (two) times a day for 7 days smx-tmp DS (BACTRIM) 800-160 mg tabs (1tab q12 D10), Starting Thu 4/28/2022, Until Thu 5/5/2022, Print         CONTINUE these medications which have NOT CHANGED    Details   al mag oxide-diphenhydramine-lidocaine viscous (MAGIC MOUTHWASH) 1:1:1 suspension Swish and spit 10 mL every 4 (four) hours as needed for mouth pain or discomfort, Starting Tu 6/22/2021, Print      albuterol (PROVENTIL HFA,VENTOLIN HFA) 90 mcg/act inhaler Inhale 2 puffs every 6 (six) hours as needed for wheezing or shortness of breath, Starting Thu 2/1/2018, Print      fluticasone (FLONASE) 50 mcg/act nasal spray 2 sprays into each nostril daily As needed for sinus congestion, Starting Thu 2/1/2018, Print      methocarbamol (ROBAXIN) 500 mg tablet Take 1 tablet (500 mg total) by mouth 2 (two) times a day, Starting Sun 3/25/2018, Print      ondansetron (ZOFRAN) 4 mg tablet Take 1 tablet (4 mg total) by mouth every 6 (six) hours as needed for nausea or vomiting (for nausea) for up to 7 days, Starting Wed 4/1/2020, Until Wed 4/8/2020, Print      predniSONE 20 mg tablet Take 1 tablet (20 mg total) by mouth daily Take 3 tabs for 3 days, then 2 tabs for 3 days, and 1 tab for last 3 days  , Starting Sun 3/25/2018, Print             No discharge procedures on file      PDMP Review     None          ED Provider  Electronically Signed by           Chace Fung MD  04/28/22 6608

## 2022-05-17 ENCOUNTER — TELEPHONE (OUTPATIENT)
Dept: OTHER | Facility: OTHER | Age: 47
End: 2022-05-17

## 2022-05-17 NOTE — TELEPHONE ENCOUNTER
Would like to follow up with someone regarding home oxygen for sleep  Unable to locate [facility]

## 2022-05-18 ENCOUNTER — TELEPHONE (OUTPATIENT)
Dept: PULMONOLOGY | Facility: CLINIC | Age: 47
End: 2022-05-18

## 2022-05-18 NOTE — TELEPHONE ENCOUNTER
Patient is calling, she was told by her PCP to call for Nacho Alvarez and have her care sorted out with her PETER  I am unsure if she is considered a follow up or a new patient as she only saw Nacho Alvarez at the sleep medicine office in \[facility]\""  She wants to see Nacho Alvarez in [facility]  She would like to see what we can do about getting her oxygen and a machine for her to sleep at night because she says she should have got one after her sleep study in 2020 but no one ever got back to her   Please advise

## 2022-05-23 ENCOUNTER — APPOINTMENT (EMERGENCY)
Dept: CT IMAGING | Facility: HOSPITAL | Age: 47
End: 2022-05-23
Payer: COMMERCIAL

## 2022-05-23 ENCOUNTER — HOSPITAL ENCOUNTER (EMERGENCY)
Facility: HOSPITAL | Age: 47
Discharge: HOME | End: 2022-05-23
Attending: EMERGENCY MEDICINE
Payer: COMMERCIAL

## 2022-05-23 VITALS
OXYGEN SATURATION: 100 % | SYSTOLIC BLOOD PRESSURE: 181 MMHG | DIASTOLIC BLOOD PRESSURE: 99 MMHG | HEART RATE: 68 BPM | RESPIRATION RATE: 18 BRPM | TEMPERATURE: 98.3 F

## 2022-05-23 DIAGNOSIS — N12 PYELONEPHRITIS: Primary | ICD-10-CM

## 2022-05-23 LAB
ALBUMIN SERPL BCP-MCNC: 3.6 G/DL (ref 3.5–5)
ALP SERPL-CCNC: 85 U/L (ref 46–116)
ALT SERPL W P-5'-P-CCNC: 15 U/L (ref 12–78)
ANION GAP SERPL CALCULATED.3IONS-SCNC: 7 MMOL/L (ref 4–13)
AST SERPL W P-5'-P-CCNC: 12 U/L (ref 5–45)
BACTERIA UR QL AUTO: ABNORMAL /HPF
BASOPHILS # BLD AUTO: 0.03 THOUSANDS/ΜL (ref 0–0.1)
BASOPHILS NFR BLD AUTO: 0 % (ref 0–1)
BILIRUB SERPL-MCNC: 0.52 MG/DL (ref 0.2–1)
BILIRUB UR QL STRIP: NEGATIVE
BUN SERPL-MCNC: 10 MG/DL (ref 5–25)
CALCIUM SERPL-MCNC: 8.9 MG/DL (ref 8.3–10.1)
CHLORIDE SERPL-SCNC: 105 MMOL/L (ref 100–108)
CLARITY UR: ABNORMAL
CO2 SERPL-SCNC: 27 MMOL/L (ref 21–32)
COLOR UR: YELLOW
CREAT SERPL-MCNC: 0.84 MG/DL (ref 0.6–1.3)
EOSINOPHIL # BLD AUTO: 0.13 THOUSAND/ΜL (ref 0–0.61)
EOSINOPHIL NFR BLD AUTO: 2 % (ref 0–6)
ERYTHROCYTE [DISTWIDTH] IN BLOOD BY AUTOMATED COUNT: 15.2 % (ref 11.6–15.1)
EXT PREG TEST URINE: NEGATIVE
EXT. CONTROL ED NAV: NORMAL
GFR SERPL CREATININE-BSD FRML MDRD: 82 ML/MIN/1.73SQ M
GLUCOSE SERPL-MCNC: 99 MG/DL (ref 65–140)
GLUCOSE UR STRIP-MCNC: NEGATIVE MG/DL
HCT VFR BLD AUTO: 40.5 % (ref 34.8–46.1)
HGB BLD-MCNC: 12.4 G/DL (ref 11.5–15.4)
HGB UR QL STRIP.AUTO: NEGATIVE
IMM GRANULOCYTES # BLD AUTO: 0.02 THOUSAND/UL (ref 0–0.2)
IMM GRANULOCYTES NFR BLD AUTO: 0 % (ref 0–2)
KETONES UR STRIP-MCNC: NEGATIVE MG/DL
LEUKOCYTE ESTERASE UR QL STRIP: ABNORMAL
LIPASE SERPL-CCNC: 23 U/L (ref 73–393)
LYMPHOCYTES # BLD AUTO: 3.21 THOUSANDS/ΜL (ref 0.6–4.47)
LYMPHOCYTES NFR BLD AUTO: 37 % (ref 14–44)
MCH RBC QN AUTO: 27.3 PG (ref 26.8–34.3)
MCHC RBC AUTO-ENTMCNC: 30.6 G/DL (ref 31.4–37.4)
MCV RBC AUTO: 89 FL (ref 82–98)
MONOCYTES # BLD AUTO: 0.72 THOUSAND/ΜL (ref 0.17–1.22)
MONOCYTES NFR BLD AUTO: 8 % (ref 4–12)
NEUTROPHILS # BLD AUTO: 4.67 THOUSANDS/ΜL (ref 1.85–7.62)
NEUTS SEG NFR BLD AUTO: 53 % (ref 43–75)
NITRITE UR QL STRIP: POSITIVE
NON-SQ EPI CELLS URNS QL MICRO: ABNORMAL /HPF
NRBC BLD AUTO-RTO: 0 /100 WBCS
PH UR STRIP.AUTO: 7.5 [PH]
PLATELET # BLD AUTO: 431 THOUSANDS/UL (ref 149–390)
PMV BLD AUTO: 10.2 FL (ref 8.9–12.7)
POTASSIUM SERPL-SCNC: 3.8 MMOL/L (ref 3.5–5.3)
PROT SERPL-MCNC: 8.3 G/DL (ref 6.4–8.2)
PROT UR STRIP-MCNC: NEGATIVE MG/DL
RBC # BLD AUTO: 4.55 MILLION/UL (ref 3.81–5.12)
RBC #/AREA URNS AUTO: ABNORMAL /HPF
SODIUM SERPL-SCNC: 139 MMOL/L (ref 136–145)
SP GR UR STRIP.AUTO: 1.02 (ref 1–1.03)
UROBILINOGEN UR QL STRIP.AUTO: 2 E.U./DL
WBC # BLD AUTO: 8.78 THOUSAND/UL (ref 4.31–10.16)
WBC #/AREA URNS AUTO: ABNORMAL /HPF

## 2022-05-23 PROCEDURE — 96374 THER/PROPH/DIAG INJ IV PUSH: CPT

## 2022-05-23 PROCEDURE — 81025 URINE PREGNANCY TEST: CPT | Performed by: EMERGENCY MEDICINE

## 2022-05-23 PROCEDURE — 99284 EMERGENCY DEPT VISIT MOD MDM: CPT

## 2022-05-23 PROCEDURE — 99285 EMERGENCY DEPT VISIT HI MDM: CPT | Performed by: EMERGENCY MEDICINE

## 2022-05-23 PROCEDURE — 96365 THER/PROPH/DIAG IV INF INIT: CPT

## 2022-05-23 PROCEDURE — 36415 COLL VENOUS BLD VENIPUNCTURE: CPT | Performed by: EMERGENCY MEDICINE

## 2022-05-23 PROCEDURE — 85025 COMPLETE CBC W/AUTO DIFF WBC: CPT | Performed by: EMERGENCY MEDICINE

## 2022-05-23 PROCEDURE — 80053 COMPREHEN METABOLIC PANEL: CPT | Performed by: EMERGENCY MEDICINE

## 2022-05-23 PROCEDURE — 83690 ASSAY OF LIPASE: CPT | Performed by: EMERGENCY MEDICINE

## 2022-05-23 PROCEDURE — 74176 CT ABD & PELVIS W/O CONTRAST: CPT

## 2022-05-23 PROCEDURE — 96375 TX/PRO/DX INJ NEW DRUG ADDON: CPT

## 2022-05-23 PROCEDURE — 81001 URINALYSIS AUTO W/SCOPE: CPT | Performed by: EMERGENCY MEDICINE

## 2022-05-23 RX ORDER — KETOROLAC TROMETHAMINE 30 MG/ML
15 INJECTION, SOLUTION INTRAMUSCULAR; INTRAVENOUS ONCE
Status: COMPLETED | OUTPATIENT
Start: 2022-05-23 | End: 2022-05-23

## 2022-05-23 RX ORDER — MORPHINE SULFATE 4 MG/ML
4 INJECTION, SOLUTION INTRAMUSCULAR; INTRAVENOUS ONCE
Status: COMPLETED | OUTPATIENT
Start: 2022-05-23 | End: 2022-05-23

## 2022-05-23 RX ORDER — CEPHALEXIN 500 MG/1
500 CAPSULE ORAL EVERY 8 HOURS SCHEDULED
Qty: 30 CAPSULE | Refills: 0 | Status: SHIPPED | OUTPATIENT
Start: 2022-05-23 | End: 2022-06-02

## 2022-05-23 RX ADMIN — MORPHINE SULFATE 4 MG: 4 INJECTION INTRAVENOUS at 21:11

## 2022-05-23 RX ADMIN — KETOROLAC TROMETHAMINE 15 MG: 30 INJECTION, SOLUTION INTRAMUSCULAR at 22:50

## 2022-05-23 RX ADMIN — CEFTRIAXONE SODIUM 1000 MG: 10 INJECTION, POWDER, FOR SOLUTION INTRAVENOUS at 21:15

## 2022-05-24 NOTE — ED PROVIDER NOTES
History  Chief Complaint   Patient presents with    Flank Pain     L sided flank pain starting over the weekend, worse yesterday  Denies urinary complaints  77-year-old female presenting emergency department for evaluation of flank pain  Patient has left-sided flank pain that started over the weekend, got worse today, denies any dysuria or bloody urine  Denies fever chills  No abdominal pain nausea vomiting otherwise  Prior to Admission Medications   Prescriptions Last Dose Informant Patient Reported? Taking? al mag oxide-diphenhydramine-lidocaine viscous (MAGIC MOUTHWASH) 1:1:1 suspension   No No   Sig: Swish and spit 10 mL every 4 (four) hours as needed for mouth pain or discomfort   albuterol (PROVENTIL HFA,VENTOLIN HFA) 90 mcg/act inhaler   No No   Sig: Inhale 2 puffs every 6 (six) hours as needed for wheezing or shortness of breath   fluticasone (FLONASE) 50 mcg/act nasal spray   No No   Si sprays into each nostril daily As needed for sinus congestion   methocarbamol (ROBAXIN) 500 mg tablet   No No   Sig: Take 1 tablet (500 mg total) by mouth 2 (two) times a day   ondansetron (ZOFRAN) 4 mg tablet   No No   Sig: Take 1 tablet (4 mg total) by mouth every 6 (six) hours as needed for nausea or vomiting (for nausea) for up to 7 days   predniSONE 20 mg tablet   No No   Sig: Take 1 tablet (20 mg total) by mouth daily Take 3 tabs for 3 days, then 2 tabs for 3 days, and 1 tab for last 3 days  Facility-Administered Medications: None       Past Medical History:   Diagnosis Date    Asthma     Hypertension        Past Surgical History:   Procedure Laterality Date     SECTION         History reviewed  No pertinent family history  I have reviewed and agree with the history as documented      E-Cigarette/Vaping    E-Cigarette Use Never User      E-Cigarette/Vaping Substances     Social History     Tobacco Use    Smoking status: Former Smoker    Smokeless tobacco: Never Used   Vaping Use    Vaping Use: Never used   Substance Use Topics    Alcohol use: No    Drug use: No       Review of Systems   Constitutional: Negative for appetite change, chills, fatigue and fever  HENT: Negative for sneezing and sore throat  Eyes: Negative for visual disturbance  Respiratory: Negative for cough, choking, chest tightness, shortness of breath and wheezing  Cardiovascular: Negative for chest pain and palpitations  Gastrointestinal: Negative for abdominal pain, constipation, diarrhea, nausea and vomiting  Genitourinary: Positive for flank pain  Negative for difficulty urinating and dysuria  Neurological: Negative for dizziness, weakness, light-headedness, numbness and headaches  All other systems reviewed and are negative  Physical Exam  Physical Exam  Vitals and nursing note reviewed  Constitutional:       General: She is not in acute distress  Appearance: She is well-developed  She is not diaphoretic  HENT:      Head: Normocephalic and atraumatic  Eyes:      Pupils: Pupils are equal, round, and reactive to light  Neck:      Vascular: No JVD  Trachea: No tracheal deviation  Cardiovascular:      Rate and Rhythm: Normal rate and regular rhythm  Heart sounds: Normal heart sounds  No murmur heard  No friction rub  No gallop  Pulmonary:      Effort: Pulmonary effort is normal  No respiratory distress  Breath sounds: Normal breath sounds  No wheezing or rales  Abdominal:      General: Bowel sounds are normal  There is no distension  Palpations: Abdomen is soft  Tenderness: There is no abdominal tenderness  There is left CVA tenderness  There is no guarding or rebound  Skin:     General: Skin is warm and dry  Coloration: Skin is not pale  Neurological:      Mental Status: She is alert and oriented to person, place, and time  Cranial Nerves: No cranial nerve deficit  Motor: No abnormal muscle tone     Psychiatric:         Behavior: Behavior normal          Vital Signs  ED Triage Vitals [05/23/22 1932]   Temperature Pulse Respirations Blood Pressure SpO2   98 3 °F (36 8 °C) 68 18 (!) 181/99 100 %      Temp Source Heart Rate Source Patient Position - Orthostatic VS BP Location FiO2 (%)   Oral Monitor Sitting Left arm --      Pain Score       --           Vitals:    05/23/22 1932   BP: (!) 181/99   Pulse: 68   Patient Position - Orthostatic VS: Sitting         Visual Acuity      ED Medications  Medications   ceftriaxone (ROCEPHIN) 1 g/50 mL in dextrose IVPB (0 mg Intravenous Stopped 5/23/22 2248)   morphine (PF) 4 mg/mL injection 4 mg (4 mg Intravenous Given 5/23/22 2111)   ketorolac (TORADOL) injection 15 mg (15 mg Intravenous Given 5/23/22 2250)       Diagnostic Studies  Results Reviewed     Procedure Component Value Units Date/Time    Comprehensive metabolic panel [[id number]]  (Abnormal) Collected: 05/23/22 2046    Lab Status: Final result Specimen: Blood from Arm, Right Updated: 05/23/22 2137     Sodium 139 mmol/L      Potassium 3 8 mmol/L      Chloride 105 mmol/L      CO2 27 mmol/L      ANION GAP 7 mmol/L      BUN 10 mg/dL      Creatinine 0 84 mg/dL      Glucose 99 mg/dL      Calcium 8 9 mg/dL      AST 12 U/L      ALT 15 U/L      Alkaline Phosphatase 85 U/L      Total Protein 8 3 g/dL      Albumin 3 6 g/dL      Total Bilirubin 0 52 mg/dL      eGFR 82 ml/min/1 73sq m     Narrative:      Damon guidelines for Chronic Kidney Disease (CKD):     Stage 1 with normal or high GFR (GFR > 90 mL/min/1 73 square meters)    Stage 2 Mild CKD (GFR = 60-89 mL/min/1 73 square meters)    Stage 3A Moderate CKD (GFR = 45-59 mL/min/1 73 square meters)    Stage 3B Moderate CKD (GFR = 30-44 mL/min/1 73 square meters)    Stage 4 Severe CKD (GFR = 15-29 mL/min/1 73 square meters)    Stage 5 End Stage CKD (GFR <15 mL/min/1 73 square meters)  Note: GFR calculation is accurate only with a steady state creatinine    Lipase [[id number]] (Abnormal) Collected: 05/23/22 2046    Lab Status: Final result Specimen: Blood from Arm, Right Updated: 05/23/22 2137     Lipase 23 u/L     Urine Microscopic [[id number]]  (Abnormal) Collected: 05/23/22 2047    Lab Status: Final result Specimen: Urine, Clean Catch Updated: 05/23/22 2110     RBC, UA None Seen /hpf      WBC, UA 0-1 /hpf      Epithelial Cells Moderate /hpf      Bacteria, UA Moderate /hpf     UA w Reflex to Microscopic w Reflex to Culture [[id number]]  (Abnormal) Collected: 05/23/22 2047    Lab Status: Final result Specimen: Urine, Clean Catch Updated: 05/23/22 2058     Color, UA Yellow     Clarity, UA Slightly Cloudy     Specific [place], UA 1 020     pH, UA 7 5     Leukocytes, UA Moderate     Nitrite, UA Positive     Protein, UA Negative mg/dl      Glucose, UA Negative mg/dl      Ketones, UA Negative mg/dl      Urobilinogen, UA 2 0 E U /dl      Bilirubin, UA Negative     Blood, UA Negative    CBC and differential [[id number]]  (Abnormal) Collected: 05/23/22 2046    Lab Status: Final result Specimen: Blood from Arm, Right Updated: 05/23/22 2056     WBC 8 78 Thousand/uL      RBC 4 55 Million/uL      Hemoglobin 12 4 g/dL      Hematocrit 40 5 %      MCV 89 fL      MCH 27 3 pg      MCHC 30 6 g/dL      RDW 15 2 %      MPV 10 2 fL      Platelets 785 Thousands/uL      nRBC 0 /100 WBCs      Neutrophils Relative 53 %      Immat GRANS % 0 %      Lymphocytes Relative 37 %      Monocytes Relative 8 %      Eosinophils Relative 2 %      Basophils Relative 0 %      Neutrophils Absolute 4 67 Thousands/µL      Immature Grans Absolute 0 02 Thousand/uL      Lymphocytes Absolute 3 21 Thousands/µL      Monocytes Absolute 0 72 Thousand/µL      Eosinophils Absolute 0 13 Thousand/µL      Basophils Absolute 0 03 Thousands/µL     POCT pregnancy, urine [[id number]]  (Normal) Resulted: 05/23/22 2047    Lab Status: Final result Updated: 05/23/22 2047     EXT PREG TEST UR (Ref: Negative) negative     Control valid                 CT abdomen pelvis wo contrast   Final Result by Reddy Bragg MD (05/23 2131)      Hepatomegaly   Noncalcified gallstones  Left renal cyst    2 mm nonobstructing right renal calculus  Workstation performed: [id number]                    Procedures  Procedures         ED Course                               SBIRT 22yo+    Flowsheet Row Most Recent Value   SBIRT (23 yo +)    In order to provide better care to our patients, we are screening all of our patients for alcohol and drug use  Would it be okay to ask you these screening questions? Yes Filed at: 05/23/2022 2030   Initial Alcohol Screen: US AUDIT-C     1  How often do you have a drink containing alcohol? 3 Filed at: 05/23/2022 2030   2  How many drinks containing alcohol do you have on a typical day you are drinking? 1 Filed at: 05/23/2022 2030   3b  FEMALE Any Age, or MALE 65+: How often do you have 4 or more drinks on one occassion? 0 Filed at: 05/23/2022 2030   Audit-C Score 4 Filed at: 05/23/2022 2030   ANN: How many times in the past year have you    Used an illegal drug or used a prescription medication for non-medical reasons? Never Filed at: 05/23/2022 2030                    MDM  Number of Diagnoses or Management Options  Pyelonephritis  Diagnosis management comments: 55-year-old female with left leg pain, concern for stone versus pyelonephritis, will check labs, UA, CT, give pain meds, reassess  Workup suggests pyelonephritis, no signs of stone/obstruction  Patient is clinically well otherwise, pain is improved, will discharge on antibiotics, recommend follow-up with Urology        Disposition  Final diagnoses:   Pyelonephritis     Time reflects when diagnosis was documented in both MDM as applicable and the Disposition within this note     Time User Action Codes Description Comment    5/23/2022 10:02 PM Barrie, [facility] [N12] Pyelonephritis       ED Disposition     ED Disposition   Discharge    Condition   Stable    Date/Time Mon May 23, 2022 10:02 PM    Comment   Nathaly Stubbs discharge to home/self care  Follow-up Information     Follow up With Specialties Details Why Contact Info Additional [address] [place] For Urology [facility] Urology   [address] [facility] [address]  [address] [place] [address] For Urology [facility], [address] [place] [facility], [facility] [address], [facility], [place], [facility]          Discharge Medication List as of 5/23/2022 10:08 PM      START taking these medications    Details   cephalexin (KEFLEX) 500 mg capsule Take 1 capsule (500 mg total) by mouth every 8 (eight) hours for 10 days, Starting Mon 5/23/2022, Until Thu 6/2/2022, Normal         CONTINUE these medications which have NOT CHANGED    Details   al mag oxide-diphenhydramine-lidocaine viscous (MAGIC MOUTHWASH) 1:1:1 suspension Swish and spit 10 mL every 4 (four) hours as needed for mouth pain or discomfort, Starting Tue 6/22/2021, Print      albuterol (PROVENTIL HFA,VENTOLIN HFA) 90 mcg/act inhaler Inhale 2 puffs every 6 (six) hours as needed for wheezing or shortness of breath, Starting Thu 2/1/2018, Print      fluticasone (FLONASE) 50 mcg/act nasal spray 2 sprays into each nostril daily As needed for sinus congestion, Starting Thu 2/1/2018, Print      methocarbamol (ROBAXIN) 500 mg tablet Take 1 tablet (500 mg total) by mouth 2 (two) times a day, Starting Sun 3/25/2018, Print      ondansetron (ZOFRAN) 4 mg tablet Take 1 tablet (4 mg total) by mouth every 6 (six) hours as needed for nausea or vomiting (for nausea) for up to 7 days, Starting Wed 4/1/2020, Until Wed 4/8/2020, Print      predniSONE 20 mg tablet Take 1 tablet (20 mg total) by mouth daily Take 3 tabs for 3 days, then 2 tabs for 3 days, and 1 tab for last 3 days  , Starting Sun 3/25/2018, Print             No discharge procedures on file      PDMP Review     None          ED Provider  Electronically Signed by           Yasmin Lopez Sylvia Lopez MD  05/27/22 0658

## 2022-06-03 ENCOUNTER — TELEPHONE (OUTPATIENT)
Dept: SLEEP CENTER | Facility: CLINIC | Age: 47
End: 2022-06-03

## 2022-06-03 NOTE — TELEPHONE ENCOUNTER
Patient called and left a VM, I tried calling her back but could not get an answer nor leave a VM for her

## 2022-06-04 NOTE — TELEPHONE ENCOUNTER
From what I see, she was never actually seen by Dr Lily Stuart  It looks like her physician at [facility] ordered the sleep study and patient cancelled her appt with us on 9/22/20 and never rescheduled  There is a telemedicine encounter from Dr Lily Stuart with no note, so may have been entered in error in 9/2020  She would need to establish as a new patient and would likely need a new sleep study at this point

## 2022-06-07 NOTE — TELEPHONE ENCOUNTER
Pt let VM returning Cy's call  Corrine Pitts spoke the this patient on Friday 6/3 and advised her that Dr Zuleima Reynoso was scheduling out till August for a NP  I did tell her if she was willing to travel to [place], she can try the sleep center to make a sooner appt  I also told her since her PCP ordered the sleep study, she can try calling them and see if they can order what she needs  She confirmed understanding and I transferred her to the sleep center

## 2022-06-07 NOTE — TELEPHONE ENCOUNTER
Returned patient call  Left voice message advising to call office to schedule consult with sleep specialist at [phone]

## 2022-06-08 NOTE — TELEPHONE ENCOUNTER
Patient called requesting an order for a CPAP machine  Advised patient that she would have to schedule a consult with the sleep doctor as she is not currently our patient and the doctor will not issue a script  Offered to schedule patient for next available consult  Patient stated she will reach out to PCP to see if she can be issued a script for a CPAP machine

## 2022-11-19 ENCOUNTER — OFFICE VISIT (OUTPATIENT)
Dept: URGENT CARE | Facility: CLINIC | Age: 47
End: 2022-11-19

## 2022-11-19 ENCOUNTER — APPOINTMENT (OUTPATIENT)
Dept: RADIOLOGY | Facility: CLINIC | Age: 47
End: 2022-11-19

## 2022-11-19 VITALS
HEART RATE: 67 BPM | BODY MASS INDEX: 47.2 KG/M2 | OXYGEN SATURATION: 97 % | RESPIRATION RATE: 18 BRPM | TEMPERATURE: 97.9 F | WEIGHT: 275 LBS

## 2022-11-19 DIAGNOSIS — S16.1XXA STRAIN OF NECK MUSCLE, INITIAL ENCOUNTER: ICD-10-CM

## 2022-11-19 DIAGNOSIS — S16.1XXA STRAIN OF NECK MUSCLE, INITIAL ENCOUNTER: Primary | ICD-10-CM

## 2022-11-19 RX ORDER — IBUPROFEN 800 MG/1
800 TABLET ORAL 2 TIMES DAILY
Qty: 20 TABLET | Refills: 0 | Status: SHIPPED | OUTPATIENT
Start: 2022-11-19 | End: 2022-11-29

## 2022-11-19 RX ORDER — KETOROLAC TROMETHAMINE 30 MG/ML
30 INJECTION, SOLUTION INTRAMUSCULAR; INTRAVENOUS ONCE
Status: COMPLETED | OUTPATIENT
Start: 2022-11-19 | End: 2022-11-19

## 2022-11-19 RX ORDER — CYCLOBENZAPRINE HCL 10 MG
10 TABLET ORAL
Qty: 10 TABLET | Refills: 0 | Status: SHIPPED | OUTPATIENT
Start: 2022-11-19 | End: 2022-11-29

## 2022-11-19 RX ADMIN — KETOROLAC TROMETHAMINE 30 MG: 30 INJECTION, SOLUTION INTRAMUSCULAR; INTRAVENOUS at 14:24

## 2022-11-19 NOTE — PROGRESS NOTES
[facility]        NAME: Jordan Gutierrez is a 50 y o  female  : 1975    MRN: [id number]  DATE: 2022  TIME: 2:55 PM    Assessment and Plan   Strain of neck muscle, initial encounter [S16  1XXA]  1  Strain of neck muscle, initial encounter  ketorolac (TORADOL) injection 30 mg    XR spine cervical 2 or 3 vw injury    ibuprofen (MOTRIN) 800 mg tablet    cyclobenzaprine (FLEXERIL) 10 mg tablet            Patient Instructions   Patient Instructions   1  Meds as instructed  2  F/u with PCP in 2-3 days  3  Ice 20 min at a time 3-4 x/day , then heat thereafter  4  Proceed to the  ER if symptoms get worse    Patient was instructed to stop the muscle relaxants that she is on and to start the Flexeril and high dose Motrin  Patient was also given an IM dose of Toradol here in the department  Follow up with PCP in 3-5 days  Proceed to  ER if symptoms worsen  Chief Complaint     Chief Complaint   Patient presents with   • Neck Pain      Started a week ago  Denies any injuries  History of Present Illness       51 yo w female with cc neck pain  Pt  States pain started about a week ago  No known injury  Review of Systems   Review of Systems   Constitutional: Negative for chills and fever  HENT: Negative for congestion and rhinorrhea  Positive neck pain   Eyes: Negative for discharge and visual disturbance  Respiratory: Negative for shortness of breath and wheezing  Cardiovascular: Negative for chest pain and palpitations  Gastrointestinal: Negative for abdominal pain and vomiting  Endocrine: Negative for polydipsia and polyuria  Genitourinary: Negative for dysuria and hematuria  Musculoskeletal: Negative for arthralgias, gait problem and neck stiffness  Skin: Negative for rash and wound  Neurological: Negative for dizziness and headaches  Psychiatric/Behavioral: Negative for confusion and suicidal ideas           Current Medications       Current Outpatient Medications:   •  cyclobenzaprine (FLEXERIL) 10 mg tablet, Take 1 tablet (10 mg total) by mouth daily at bedtime for 10 days, Disp: 10 tablet, Rfl: 0  •  ibuprofen (MOTRIN) 800 mg tablet, Take 1 tablet (800 mg total) by mouth 2 (two) times a day for 10 days, Disp: 20 tablet, Rfl: 0  •  al mag oxide-diphenhydramine-lidocaine viscous (MAGIC MOUTHWASH) 1:1:1 suspension, Swish and spit 10 mL every 4 (four) hours as needed for mouth pain or discomfort, Disp: 180 mL, Rfl: 0  •  albuterol (PROVENTIL HFA,VENTOLIN HFA) 90 mcg/act inhaler, Inhale 2 puffs every 6 (six) hours as needed for wheezing or shortness of breath, Disp: 1 Inhaler, Rfl: 0  •  fluticasone (FLONASE) 50 mcg/act nasal spray, 2 sprays into each nostril daily As needed for sinus congestion, Disp: 1 Bottle, Rfl: 0  •  ondansetron (ZOFRAN) 4 mg tablet, Take 1 tablet (4 mg total) by mouth every 6 (six) hours as needed for nausea or vomiting (for nausea) for up to 7 days, Disp: 21 tablet, Rfl: 0  •  predniSONE 20 mg tablet, Take 1 tablet (20 mg total) by mouth daily Take 3 tabs for 3 days, then 2 tabs for 3 days, and 1 tab for last 3 days  , Disp: 18 tablet, Rfl: 0  No current facility-administered medications for this visit  Current Allergies     Allergies as of 2022 - Reviewed 2022   Allergen Reaction Noted   • Amoxicillin  2016   • Ampicillin Nausea Only 2021   • Penicillins Nausea Only 2021            The following portions of the patient's history were reviewed and updated as appropriate: allergies, current medications, past family history, past medical history, past social history, past surgical history and problem list      Past Medical History:   Diagnosis Date   • Asthma    • Hypertension        Past Surgical History:   Procedure Laterality Date   •  SECTION         No family history on file  Medications have been verified          Objective   Pulse 67   Temp 97 9 °F (36 6 °C)   Resp 18   Wt 125 kg (275 lb)   SpO2 97%   BMI 47 20 kg/m²        Physical Exam     Physical Exam  Vitals reviewed  Constitutional:       General: She is not in acute distress  Appearance: She is well-developed  She is not ill-appearing, toxic-appearing or diaphoretic  Comments: 57-year-old female sitting on the stretcher with an ice pack to the back of her neck  HENT:      Head: Normocephalic and atraumatic  Mouth/Throat:      Pharynx: Oropharynx is clear  Eyes:      General: No scleral icterus  Extraocular Movements: Extraocular movements intact  Pupils: Pupils are equal, round, and reactive to light  Neck:      Comments: There is tenderness to the mid and lower cervical region of the neck especially the paracervical muscles of the spine  There is tenderness to the thoracic and trapezius regions as well  Patient has most pain in flexion  Patient is able to flex the neck and extend it as well as rotate to the left and right but somewhat limited  Cardiovascular:      Rate and Rhythm: Normal rate  Heart sounds: Normal heart sounds  Pulmonary:      Effort: Pulmonary effort is normal  No respiratory distress  Breath sounds: No stridor  No wheezing, rhonchi or rales  Chest:      Chest wall: No tenderness  Abdominal:      General: There is no distension  Palpations: There is no shifting dullness, hepatomegaly, splenomegaly or mass  Tenderness: There is no abdominal tenderness  There is no right CVA tenderness, left CVA tenderness or guarding  Negative signs include Paulino's sign and McBurney's sign  Hernia: No hernia is present  Skin:     General: Skin is warm and dry  Coloration: Skin is not cyanotic, jaundiced, mottled or pale  Findings: No erythema  Neurological:      General: No focal deficit present  Mental Status: She is alert and oriented to person, place, and time  Comments: Patient has good  bilaterally     Psychiatric:         Mood and Affect: Mood normal

## 2022-11-19 NOTE — PATIENT INSTRUCTIONS
Meds as instructed  F/u with PCP in 2-3 days  Ice 20 min at a time 3-4 x/day , then heat thereafter  Proceed to the  ER if symptoms get worse

## 2022-12-01 ENCOUNTER — APPOINTMENT (EMERGENCY)
Dept: CT IMAGING | Facility: HOSPITAL | Age: 47
End: 2022-12-01

## 2022-12-01 ENCOUNTER — HOSPITAL ENCOUNTER (EMERGENCY)
Facility: HOSPITAL | Age: 47
Discharge: HOME | End: 2022-12-01
Attending: EMERGENCY MEDICINE

## 2022-12-01 VITALS
OXYGEN SATURATION: 100 % | RESPIRATION RATE: 16 BRPM | HEART RATE: 78 BPM | TEMPERATURE: 98.2 F | DIASTOLIC BLOOD PRESSURE: 98 MMHG | SYSTOLIC BLOOD PRESSURE: 188 MMHG

## 2022-12-01 DIAGNOSIS — R11.0 NAUSEA: ICD-10-CM

## 2022-12-01 DIAGNOSIS — R51.9 HEADACHE: Primary | ICD-10-CM

## 2022-12-01 DIAGNOSIS — R03.0 ELEVATED BLOOD PRESSURE READING: ICD-10-CM

## 2022-12-01 DIAGNOSIS — Z86.69 HX OF MIGRAINES: ICD-10-CM

## 2022-12-01 DIAGNOSIS — R42 DIZZINESS: ICD-10-CM

## 2022-12-01 LAB
ANION GAP SERPL CALCULATED.3IONS-SCNC: 10 MMOL/L (ref 4–13)
ATRIAL RATE: 67 BPM
BASOPHILS # BLD AUTO: 0.04 THOUSANDS/ÂΜL (ref 0–0.1)
BASOPHILS NFR BLD AUTO: 0 % (ref 0–1)
BUN SERPL-MCNC: 13 MG/DL (ref 5–25)
CALCIUM SERPL-MCNC: 8.9 MG/DL (ref 8.3–10.1)
CARDIAC TROPONIN I PNL SERPL HS: 3 NG/L
CHLORIDE SERPL-SCNC: 103 MMOL/L (ref 96–108)
CO2 SERPL-SCNC: 29 MMOL/L (ref 21–32)
CREAT SERPL-MCNC: 0.81 MG/DL (ref 0.6–1.3)
EOSINOPHIL # BLD AUTO: 0.2 THOUSAND/ÂΜL (ref 0–0.61)
EOSINOPHIL NFR BLD AUTO: 2 % (ref 0–6)
ERYTHROCYTE [DISTWIDTH] IN BLOOD BY AUTOMATED COUNT: 14.1 % (ref 11.6–15.1)
FLUAV RNA RESP QL NAA+PROBE: NEGATIVE
FLUBV RNA RESP QL NAA+PROBE: NEGATIVE
GFR SERPL CREATININE-BSD FRML MDRD: 85 ML/MIN/1.73SQ M
GLUCOSE SERPL-MCNC: 116 MG/DL (ref 65–140)
HCT VFR BLD AUTO: 41.1 % (ref 34.8–46.1)
HGB BLD-MCNC: 12.8 G/DL (ref 11.5–15.4)
IMM GRANULOCYTES # BLD AUTO: 0.02 THOUSAND/UL (ref 0–0.2)
IMM GRANULOCYTES NFR BLD AUTO: 0 % (ref 0–2)
LYMPHOCYTES # BLD AUTO: 2.79 THOUSANDS/ÂΜL (ref 0.6–4.47)
LYMPHOCYTES NFR BLD AUTO: 27 % (ref 14–44)
MAGNESIUM SERPL-MCNC: 2.1 MG/DL (ref 1.6–2.6)
MCH RBC QN AUTO: 27.8 PG (ref 26.8–34.3)
MCHC RBC AUTO-ENTMCNC: 31.1 G/DL (ref 31.4–37.4)
MCV RBC AUTO: 89 FL (ref 82–98)
MONOCYTES # BLD AUTO: 0.69 THOUSAND/ÂΜL (ref 0.17–1.22)
MONOCYTES NFR BLD AUTO: 7 % (ref 4–12)
NEUTROPHILS # BLD AUTO: 6.63 THOUSANDS/ÂΜL (ref 1.85–7.62)
NEUTS SEG NFR BLD AUTO: 64 % (ref 43–75)
NRBC BLD AUTO-RTO: 0 /100 WBCS
P AXIS: 50 DEGREES
PLATELET # BLD AUTO: 355 THOUSANDS/UL (ref 149–390)
PMV BLD AUTO: 10.1 FL (ref 8.9–12.7)
POTASSIUM SERPL-SCNC: 4.3 MMOL/L (ref 3.5–5.3)
PR INTERVAL: 164 MS
QRS AXIS: 65 DEGREES
QRSD INTERVAL: 82 MS
QT INTERVAL: 416 MS
QTC INTERVAL: 439 MS
RBC # BLD AUTO: 4.61 MILLION/UL (ref 3.81–5.12)
RSV RNA RESP QL NAA+PROBE: NEGATIVE
SARS-COV-2 RNA RESP QL NAA+PROBE: NEGATIVE
SODIUM SERPL-SCNC: 142 MMOL/L (ref 135–147)
T WAVE AXIS: 56 DEGREES
VENTRICULAR RATE: 67 BPM
WBC # BLD AUTO: 10.37 THOUSAND/UL (ref 4.31–10.16)

## 2022-12-01 RX ORDER — ONDANSETRON 4 MG/1
4 TABLET, ORALLY DISINTEGRATING ORAL EVERY 6 HOURS PRN
Qty: 20 TABLET | Refills: 0 | Status: SHIPPED | OUTPATIENT
Start: 2022-12-01

## 2022-12-01 RX ORDER — ONDANSETRON 4 MG/1
4 TABLET, ORALLY DISINTEGRATING ORAL EVERY 6 HOURS PRN
Qty: 20 TABLET | Refills: 0 | Status: SHIPPED | OUTPATIENT
Start: 2022-12-01 | End: 2022-12-01 | Stop reason: SDUPTHER

## 2022-12-01 RX ORDER — MECLIZINE HYDROCHLORIDE 25 MG/1
25 TABLET ORAL ONCE
Status: COMPLETED | OUTPATIENT
Start: 2022-12-01 | End: 2022-12-01

## 2022-12-01 RX ORDER — MECLIZINE HYDROCHLORIDE 25 MG/1
25 TABLET ORAL 3 TIMES DAILY PRN
Qty: 30 TABLET | Refills: 0 | Status: SHIPPED | OUTPATIENT
Start: 2022-12-01 | End: 2022-12-01 | Stop reason: SDUPTHER

## 2022-12-01 RX ORDER — MECLIZINE HYDROCHLORIDE 25 MG/1
25 TABLET ORAL 3 TIMES DAILY PRN
Qty: 30 TABLET | Refills: 0 | Status: SHIPPED | OUTPATIENT
Start: 2022-12-01

## 2022-12-01 RX ORDER — METOCLOPRAMIDE HYDROCHLORIDE 5 MG/ML
10 INJECTION INTRAMUSCULAR; INTRAVENOUS ONCE
Status: COMPLETED | OUTPATIENT
Start: 2022-12-01 | End: 2022-12-01

## 2022-12-01 RX ORDER — DIPHENHYDRAMINE HYDROCHLORIDE 50 MG/ML
25 INJECTION INTRAMUSCULAR; INTRAVENOUS ONCE
Status: COMPLETED | OUTPATIENT
Start: 2022-12-01 | End: 2022-12-01

## 2022-12-01 RX ADMIN — MECLIZINE HYDROCHLORIDE 25 MG: 25 TABLET ORAL at 08:25

## 2022-12-01 RX ADMIN — METOCLOPRAMIDE 10 MG: 5 INJECTION, SOLUTION INTRAMUSCULAR; INTRAVENOUS at 08:26

## 2022-12-01 RX ADMIN — DIPHENHYDRAMINE HYDROCHLORIDE 25 MG: 50 INJECTION, SOLUTION INTRAMUSCULAR; INTRAVENOUS at 08:25

## 2022-12-01 RX ADMIN — SODIUM CHLORIDE, POTASSIUM CHLORIDE, SODIUM LACTATE AND CALCIUM CHLORIDE 1000 ML: 600; 310; 30; 20 INJECTION, SOLUTION INTRAVENOUS at 08:26

## 2022-12-01 NOTE — ED PROVIDER NOTES
History  Chief Complaint   Patient presents with   • Dizziness     Pt states that she has been experiencing dizziness, blurry vision, and nausea for the past couple days  Pt states she has a hx of high blood pressure  HPI    Prior to Admission Medications   Prescriptions Last Dose Informant Patient Reported? Taking? al mag oxide-diphenhydramine-lidocaine viscous (MAGIC MOUTHWASH) 1:1:1 suspension   No No   Sig: Swish and spit 10 mL every 4 (four) hours as needed for mouth pain or discomfort   albuterol (PROVENTIL HFA,VENTOLIN HFA) 90 mcg/act inhaler   No No   Sig: Inhale 2 puffs every 6 (six) hours as needed for wheezing or shortness of breath   cyclobenzaprine (FLEXERIL) 10 mg tablet   No No   Sig: Take 1 tablet (10 mg total) by mouth daily at bedtime for 10 days   fluticasone (FLONASE) 50 mcg/act nasal spray   No No   Si sprays into each nostril daily As needed for sinus congestion   ibuprofen (MOTRIN) 800 mg tablet   No No   Sig: Take 1 tablet (800 mg total) by mouth 2 (two) times a day for 10 days   ondansetron (ZOFRAN) 4 mg tablet   No No   Sig: Take 1 tablet (4 mg total) by mouth every 6 (six) hours as needed for nausea or vomiting (for nausea) for up to 7 days   predniSONE 20 mg tablet   No No   Sig: Take 1 tablet (20 mg total) by mouth daily Take 3 tabs for 3 days, then 2 tabs for 3 days, and 1 tab for last 3 days  Facility-Administered Medications: None       Past Medical History:   Diagnosis Date   • Arthritis    • Asthma    • Hypertension        Past Surgical History:   Procedure Laterality Date   •  SECTION         History reviewed  No pertinent family history  I have reviewed and agree with the history as documented      E-Cigarette/Vaping   • E-Cigarette Use Never User      E-Cigarette/Vaping Substances     Social History     Tobacco Use   • Smoking status: Former   • Smokeless tobacco: Never   Vaping Use   • Vaping Use: Never used   Substance Use Topics   • Alcohol use: No   • Drug use: No       Review of Systems    Physical Exam  Physical Exam  Vitals and nursing note reviewed  Constitutional:       General: She is not in acute distress  Appearance: She is well-developed and well-nourished  She is obese  Comments: hypertensive   HENT:      Head: Normocephalic and atraumatic  Mouth/Throat:      Mouth: Oropharynx is clear and moist    Eyes:      Extraocular Movements: Extraocular movements intact  Conjunctiva/sclera: Conjunctivae normal       Pupils: Pupils are equal, round, and reactive to light  Neck:      Trachea: No tracheal deviation  Cardiovascular:      Rate and Rhythm: Normal rate and regular rhythm  Pulses: Intact distal pulses  Heart sounds: Normal heart sounds  Pulmonary:      Effort: Pulmonary effort is normal  No respiratory distress  Breath sounds: Normal breath sounds  Abdominal:      General: There is no distension  Palpations: Abdomen is soft  Tenderness: There is no abdominal tenderness  Musculoskeletal:      Cervical back: Normal range of motion  Skin:     General: Skin is warm and dry  Neurological:      Mental Status: She is alert and oriented to person, place, and time  GCS: GCS eye subscore is 4  GCS verbal subscore is 5  GCS motor subscore is 6  Cranial Nerves: No dysarthria or facial asymmetry  Sensory: Sensation is intact  No sensory deficit  Motor: Motor strength is normal  Motor function is intact  No weakness  Coordination: Romberg sign negative  Finger-Nose-Finger Test and Heel to [facility] Test normal       Gait: Gait normal       Deep Tendon Reflexes:      Reflex Scores:       Bicep reflexes are 1+ on the right side and 1+ on the left side  Brachioradialis reflexes are 1+ on the right side and 1+ on the left side  Patellar reflexes are 1+ on the right side and 1+ on the left side    Psychiatric:         Mood and Affect: Mood and affect normal          Behavior: Behavior normal          Vital Signs  ED Triage Vitals [12/01/22 0734]   Temperature Pulse Respirations Blood Pressure SpO2   98 2 °F (36 8 °C) 72 16 (!) 187/91 98 %      Temp Source Heart Rate Source Patient Position - Orthostatic VS BP Location FiO2 (%)   Oral Monitor Sitting Left arm --      Pain Score       --           Vitals:    12/01/22 0734 12/01/22 0900 12/01/22 0902 12/01/22 0903   BP: (!) 187/91 150/85 (!) 183/101 (!) 188/98   Pulse: 72 75 70 78   Patient Position - Orthostatic VS: Sitting Lying - Orthostatic VS Sitting - Orthostatic VS Standing for 3 minutes - Orthostatic VS         Visual Acuity      ED Medications  Medications   lactated ringers bolus 1,000 mL (1,000 mL Intravenous New Bag 12/1/22 0826)   metoclopramide (REGLAN) injection 10 mg (10 mg Intravenous Given 12/1/22 0826)   diphenhydrAMINE (BENADRYL) injection 25 mg (25 mg Intravenous Given 12/1/22 0825)   meclizine (ANTIVERT) tablet 25 mg (25 mg Oral Given 12/1/22 0825)       Diagnostic Studies  Results Reviewed     Procedure Component Value Units Date/Time    FLU/RSV/COVID - if FLU/RSV clinically relevant [[id number]]  (Normal) Collected: 12/01/22 0827    Lab Status: Final result Specimen: Nares from Nose Updated: 12/01/22 0932     SARS-CoV-2 Negative     INFLUENZA A PCR Negative     INFLUENZA B PCR Negative     RSV PCR Negative    Narrative:      FOR PEDIATRIC PATIENTS - copy/paste COVID Guidelines URL to browser: [email or web address]  [employer]    SARS-CoV-2 assay is a Nucleic Acid Amplification assay intended for the  qualitative detection of nucleic acid from SARS-CoV-2 in nasopharyngeal  swabs  Results are for the presumptive identification of SARS-CoV-2 RNA  Positive results are indicative of infection with SARS-CoV-2, the virus  causing COVID-19, but do not rule out bacterial infection or co-infection  with other viruses   Laboratories within the [place] and its  territories are required to report all positive results to the appropriate  public health authorities  Negative results do not preclude SARS-CoV-2  infection and should not be used as the sole basis for treatment or other  patient management decisions  Negative results must be combined with  clinical observations, patient history, and epidemiological information  This test has not been FDA cleared or approved  This test has been authorized by FDA under an Emergency Use Authorization  (EUA)  This test is only authorized for the duration of time the  declaration that circumstances exist justifying the authorization of the  emergency use of an in vitro diagnostic tests for detection of SARS-CoV-2  virus and/or diagnosis of COVID-19 infection under section 564(b)(1) of  the Act, 21 U  S C  441CCR-4(I)(5), unless the authorization is terminated  or revoked sooner  The test has been validated but independent review by FDA  and CLIA is pending  Test performed using [employer] GeneXpert: This RT-PCR assay targets N2,  a region unique to SARS-CoV-2  A conserved region in the E-gene was chosen  for pan-Sarbecovirus detection which includes SARS-CoV-2  According to CMS-2020-01-R, this platform meets the definition of high-throughput technology      HS Troponin 0hr (reflex protocol) [[id number]]  (Normal) Collected: 12/01/22 0827    Lab Status: Final result Specimen: Blood from Arm, Right Updated: 12/01/22 0908     hs TnI 0hr 3 ng/L     Basic metabolic panel [[id number]] Collected: 12/01/22 0827    Lab Status: Final result Specimen: Blood from Arm, Right Updated: 12/01/22 0847     Sodium 142 mmol/L      Potassium 4 3 mmol/L      Chloride 103 mmol/L      CO2 29 mmol/L      ANION GAP 10 mmol/L      BUN 13 mg/dL      Creatinine 0 81 mg/dL      Glucose 116 mg/dL      Calcium 8 9 mg/dL      eGFR 85 ml/min/1 73sq m     Narrative:      Damon guidelines for Chronic Kidney Disease (CKD):   •  Stage 1 with normal or high GFR (GFR > 90 mL/min/1 73 square meters)  •  Stage 2 Mild CKD (GFR = 60-89 mL/min/1 73 square meters)  •  Stage 3A Moderate CKD (GFR = 45-59 mL/min/1 73 square meters)  •  Stage 3B Moderate CKD (GFR = 30-44 mL/min/1 73 square meters)  •  Stage 4 Severe CKD (GFR = 15-29 mL/min/1 73 square meters)  •  Stage 5 End Stage CKD (GFR <15 mL/min/1 73 square meters)  Note: GFR calculation is accurate only with a steady state creatinine    Magnesium [[id number]]  (Normal) Collected: 12/01/22 0827    Lab Status: Final result Specimen: Blood from Arm, Right Updated: 12/01/22 0847     Magnesium 2 1 mg/dL     CBC and differential [[id number]]  (Abnormal) Collected: 12/01/22 0827    Lab Status: Final result Specimen: Blood from Arm, Right Updated: 12/01/22 0833     WBC 10 37 Thousand/uL      RBC 4 61 Million/uL      Hemoglobin 12 8 g/dL      Hematocrit 41 1 %      MCV 89 fL      MCH 27 8 pg      MCHC 31 1 g/dL      RDW 14 1 %      MPV 10 1 fL      Platelets 083 Thousands/uL      nRBC 0 /100 WBCs      Neutrophils Relative 64 %      Immat GRANS % 0 %      Lymphocytes Relative 27 %      Monocytes Relative 7 %      Eosinophils Relative 2 %      Basophils Relative 0 %      Neutrophils Absolute 6 63 Thousands/µL      Immature Grans Absolute 0 02 Thousand/uL      Lymphocytes Absolute 2 79 Thousands/µL      Monocytes Absolute 0 69 Thousand/µL      Eosinophils Absolute 0 20 Thousand/µL      Basophils Absolute 0 04 Thousands/µL                  CT head without contrast   Final Result by Bettie Bains MD (12/01 2467)      No acute intracranial abnormality  Workstation performed: [id number]                    Procedures  Procedures         ED Course                               SBIRT 20yo+    Flowsheet Row Most Recent Value   SBIRT (25 yo +)    In order to provide better care to our patients, we are screening all of our patients for alcohol and drug use  Would it be okay to ask you these screening questions?  Yes Filed at: 12/01/2022 0732   Initial Alcohol Screen: US AUDIT-C     1  How often do you have a drink containing alcohol? 0 Filed at: 12/01/2022 0732   2  How many drinks containing alcohol do you have on a typical day you are drinking? 0 Filed at: 12/01/2022 0732   3b  FEMALE Any Age, or MALE 65+: How often do you have 4 or more drinks on one occassion? 0 Filed at: 12/01/2022 0732   Audit-C Score 0 Filed at: 12/01/2022 0275   ANN: How many times in the past year have you    Used an illegal drug or used a prescription medication for non-medical reasons? Never Filed at: 12/01/2022 0732                    MDM  Number of Diagnoses or Management Options  Dizziness: new and requires workup  Elevated blood pressure reading: new and requires workup  Headache: new and requires workup  Hx of migraines: new and requires workup  Nausea: new and requires workup  Diagnosis management comments: This is a 73-year-old female who presents here today with headache and "dizziness " She says a couple of weeks ago she began feeling like she had "hold "  She did have exposure to a family member who had Alo  She did test herself twice at over the past couple of weeks, and was negative for COVID  She endorses nasal congestion and occasional cough but denies fevers, myalgias, arthralgias, shortness of breath  She says for about a week she has had a migraine which is different from typical headaches  It is currently "throbbing" and seems to start at the base of the skull and radiates forward  She says it has been awhile since her last migraine is no longer taking prescription medications for it  She says usually headaches are not throbbing, and are located to the side of her head  She does have nausea but no vomiting  She endorses occasional blurry vision  Symptoms are worse with being upright and are improved at rest   She denies any chest pain or palpitations  She denies any syncope or presyncope    She does occasionally endorse a spinning sensation with standing up which is not present at rest   She does occasionally feel off balance when walking around  She denies any falls due to the dizziness, or preceding injuries to her head or neck  She has tried occasional over-the-counter medications for her symptoms without improvement  She has never had dizziness with migraines before  She has no focal weakness or numbness  She says she has been taking her blood pressure medications as prescribed  She has had some elevated blood pressures up to the 160s recently  She does state that her aunt and uncle both  within the past week  Review of systems:  Otherwise negative unless stated as above    She is well-appearing, in no acute distress  She has no focal neurologic deficits on exam   Exam is otherwise unremarkable  This is most likely dizziness in the setting of atypical migraine, exacerbated by underlying URI and recent stress from loss of family members  However, there is the possibility of associated dysrhythmia, dehydration, CURTIS, electrolyte abnormality, complications related to her high blood pressure, space-occupying lesion especially given change from baseline headaches  We will treat her symptoms here, get CT scan and lab work to evaluate  CT scan shows no acute abnormalities  Blood work is unremarkable  She does endorse some improvement of symptoms with medications here  I discussed with her findings, continued symptomatic treatment at home, follow-up with PCP, and indications for return and she expresses understanding with this plan  I did advise her to monitor and follow up on her blood pressure with her PCP         Amount and/or Complexity of Data Reviewed  Clinical lab tests: reviewed and ordered  Tests in the radiology section of CPT®: ordered and reviewed  Independent visualization of images, tracings, or specimens: yes        Disposition  Final diagnoses:   Headache   Dizziness   Elevated blood pressure reading   Nausea   Hx of migraines     Time reflects when diagnosis was documented in both MDM as applicable and the Disposition within this note     Time User Action Codes Description Comment    12/1/2022  9:42 AM Strange-Tesoriero, [place] Add [R51 9] Headache     12/1/2022  9:42 AM Strange-Tesoriero, [place] Add [R42] Dizziness     12/1/2022  9:42 AM Strange-Tesoriero, [place] Add [R03 0] Elevated blood pressure reading     12/1/2022  9:42 AM Strange-Tesoriero, [place] Add [R11 0] Nausea     12/1/2022  9:44 AM Strange-Tesoriero, [place] Add [Z86 69] Hx of migraines       ED Disposition     ED Disposition   Discharge    Condition   Good    Date/Time   Thu Dec 1, 2022 79-01 [place] discharge to home/self care           Follow-up Information     Follow up With Specialties Details Why Contact Info Additional Information    Noemi Willson MD Family Medicine Schedule an appointment as soon as possible for a visit  to follow up on your symptoms [address]  [place]       [facility] Neurology [address] Neurology Schedule an appointment as soon as possible for a visit  to follow up on your headaches [address] [facility] [id number]  [address]  Neurology [address], [address], [address]          Patient's Medications   Discharge Prescriptions    MECLIZINE (ANTIVERT) 25 MG TABLET    Take 1 tablet (25 mg total) by mouth 3 (three) times a day as needed for dizziness       Start Date: 12/1/2022 End Date: --       Order Dose: 25 mg       Quantity: 30 tablet    Refills: 0    ONDANSETRON (ZOFRAN-ODT) 4 MG DISINTEGRATING TABLET    Take 1 tablet (4 mg total) by mouth every 6 (six) hours as needed for nausea or vomiting       Start Date: 12/1/2022 End Date: --       Order Dose: 4 mg       Quantity: 20 tablet    Refills: 0           PDMP Review None          ED Provider  Electronically Signed by           Mor Jurado MD  12/01/22 7760

## 2022-12-01 NOTE — DISCHARGE INSTRUCTIONS
Continue to take over-the-counter medications to help with your headaches  Take the nausea medication and medication for dizziness as needed  Follow-up with your primary care doctor in the neurologist for further evaluation and management of your headaches  Your blood pressures were elevated here today in the emergency department  Continue to monitor them home, as if they stay elevated your doctor may need to adjust your medications  Return if you develop worsening or changing symptoms, or for any other concerns

## 2023-09-08 ENCOUNTER — OFFICE VISIT (OUTPATIENT)
Dept: URGENT CARE | Facility: CLINIC | Age: 48
End: 2023-09-08
Payer: COMMERCIAL

## 2023-09-08 VITALS — HEART RATE: 64 BPM | TEMPERATURE: 98.8 F | OXYGEN SATURATION: 100 % | RESPIRATION RATE: 18 BRPM

## 2023-09-08 DIAGNOSIS — M54.41 CHRONIC BILATERAL LOW BACK PAIN WITH BILATERAL SCIATICA: Primary | ICD-10-CM

## 2023-09-08 DIAGNOSIS — M54.42 CHRONIC BILATERAL LOW BACK PAIN WITH BILATERAL SCIATICA: Primary | ICD-10-CM

## 2023-09-08 DIAGNOSIS — G89.29 CHRONIC BILATERAL LOW BACK PAIN WITH BILATERAL SCIATICA: Primary | ICD-10-CM

## 2023-09-08 PROCEDURE — 99214 OFFICE O/P EST MOD 30 MIN: CPT | Performed by: PHYSICIAN ASSISTANT

## 2023-09-08 RX ORDER — LOSARTAN POTASSIUM 100 MG/1
100 TABLET ORAL DAILY
COMMUNITY
Start: 2023-08-02

## 2023-09-08 RX ORDER — FAMOTIDINE 20 MG/1
20 TABLET, FILM COATED ORAL 2 TIMES DAILY
COMMUNITY
Start: 2023-07-17

## 2023-09-08 RX ORDER — ALPRAZOLAM 1 MG/1
1 TABLET ORAL DAILY PRN
COMMUNITY
Start: 2012-08-16

## 2023-09-08 RX ORDER — GABAPENTIN 800 MG/1
800 TABLET ORAL 3 TIMES DAILY
COMMUNITY
Start: 2023-07-17

## 2023-09-08 RX ORDER — ROSUVASTATIN CALCIUM 10 MG/1
10 TABLET, COATED ORAL DAILY
COMMUNITY
Start: 2023-06-09

## 2023-09-08 RX ORDER — CARVEDILOL 6.25 MG/1
6.25 TABLET ORAL 2 TIMES DAILY
COMMUNITY
Start: 2023-06-09

## 2023-09-08 RX ORDER — CYCLOBENZAPRINE HCL 10 MG
10 TABLET ORAL 3 TIMES DAILY PRN
Qty: 30 TABLET | Refills: 0 | Status: SHIPPED | OUTPATIENT
Start: 2023-09-08

## 2023-09-08 RX ORDER — TIRZEPATIDE 5 MG/.5ML
INJECTION, SOLUTION SUBCUTANEOUS
COMMUNITY
Start: 2023-08-18

## 2023-09-08 NOTE — PROGRESS NOTES
[facility]        NAME: Katherine Awan is a 50 y.o. female  : 1975    MRN: [id number]  DATE: 2023  TIME: 3:00 PM      Assessment and Plan     Chronic bilateral low back pain with bilateral sciatica [M54.42, M54.41, G89.29]  1. Chronic bilateral low back pain with bilateral sciatica  cyclobenzaprine (FLEXERIL) 10 mg tablet        Note:   Patient was looking for narcotics, which I was not giving her because she is already managed by pain management. Patient Instructions   There are no Patient Instructions on file for this visit. Follow up with PCP in 3-5 days. Go to ER if symptoms worsen. Chief Complaint     Chief Complaint   Patient presents with   • Back Pain     Patient here with upper back pain which radiates and now patient feels the pain is throughout her whole body now, for 3 days. Patient states she suffers from chronic pain. History of Present Illness     Patient presents with complaints of pain and burning from the shoulders radiating down to her feet bilaterally x 3 days. She states she tried motrin which helps for about 1 hour. States it is an 8/10 pain and this has happened before, which she has seen pain management previously. She was given hydrocodone, gabapentin, and another medication that she can't remember which helped her pain. She now has a new pain management provider, who is giving her "shots for her sciatica" which are not giving her any relief. Review of Systems     Review of Systems   Constitutional: Negative for chills, fatigue and fever. HENT: Negative for congestion, ear pain, postnasal drip, rhinorrhea, sinus pressure, sinus pain and sore throat. Eyes: Negative for pain and visual disturbance. Respiratory: Negative for cough, chest tightness and shortness of breath. Cardiovascular: Negative for chest pain and palpitations. Gastrointestinal: Negative for abdominal pain, diarrhea, nausea and vomiting.    Genitourinary: Negative for dysuria and hematuria. Musculoskeletal: Positive for back pain. Negative for arthralgias and myalgias. Skin: Negative for rash. Neurological: Negative for dizziness, seizures, syncope, numbness and headaches. All other systems reviewed and are negative.         Current Medications       Current Outpatient Medications:   •  albuterol (PROVENTIL HFA,VENTOLIN HFA) 90 mcg/act inhaler, Inhale 2 puffs every 6 (six) hours as needed for wheezing or shortness of breath, Disp: 1 Inhaler, Rfl: 0  •  ALPRAZolam (XANAX) 1 mg tablet, Take 1 mg by mouth daily as needed, Disp: , Rfl:   •  carvedilol (COREG) 6.25 mg tablet, Take 6.25 mg by mouth 2 (two) times a day, Disp: , Rfl:   •  cyclobenzaprine (FLEXERIL) 10 mg tablet, Take 1 tablet (10 mg total) by mouth 3 (three) times a day as needed for muscle spasms, Disp: 30 tablet, Rfl: 0  •  famotidine (PEPCID) 20 mg tablet, Take 20 mg by mouth 2 (two) times a day, Disp: , Rfl:   •  fluticasone (FLONASE) 50 mcg/act nasal spray, 2 sprays into each nostril daily As needed for sinus congestion, Disp: 1 Bottle, Rfl: 0  •  gabapentin (NEURONTIN) 800 mg tablet, Take 800 mg by mouth 3 (three) times a day, Disp: , Rfl:   •  losartan (COZAAR) 100 MG tablet, Take 100 mg by mouth daily, Disp: , Rfl:   •  metFORMIN (GLUCOPHAGE) 500 mg tablet, Take 500 mg by mouth 2 (two) times a day with meals, Disp: , Rfl:   •  Mounjaro 5 MG/0.5ML, INJECT 5MG UNDER THE SKIN EVERY 7 DAYS, Disp: , Rfl:   •  ondansetron (ZOFRAN-ODT) 4 mg disintegrating tablet, Take 1 tablet (4 mg total) by mouth every 6 (six) hours as needed for nausea or vomiting, Disp: 20 tablet, Rfl: 0  •  rosuvastatin (CRESTOR) 10 MG tablet, Take 10 mg by mouth daily, Disp: , Rfl:   •  al mag oxide-diphenhydramine-lidocaine viscous (MAGIC MOUTHWASH) 1:1:1 suspension, Swish and spit 10 mL every 4 (four) hours as needed for mouth pain or discomfort (Patient not taking: Reported on 9/8/2023), Disp: 180 mL, Rfl: 0  •  ibuprofen (MOTRIN) 800 mg tablet, Take 1 tablet (800 mg total) by mouth 2 (two) times a day for 10 days (Patient not taking: Reported on 2023), Disp: 20 tablet, Rfl: 0  •  meclizine (ANTIVERT) 25 mg tablet, Take 1 tablet (25 mg total) by mouth 3 (three) times a day as needed for dizziness (Patient not taking: Reported on 2023), Disp: 30 tablet, Rfl: 0  •  ondansetron (ZOFRAN) 4 mg tablet, Take 1 tablet (4 mg total) by mouth every 6 (six) hours as needed for nausea or vomiting (for nausea) for up to 7 days, Disp: 21 tablet, Rfl: 0  •  predniSONE 20 mg tablet, Take 1 tablet (20 mg total) by mouth daily Take 3 tabs for 3 days, then 2 tabs for 3 days, and 1 tab for last 3 days. (Patient not taking: Reported on 2023), Disp: 18 tablet, Rfl: 0    Current Allergies     Allergies as of 2023 - Reviewed 2023   Allergen Reaction Noted   • Sumatriptan Shortness Of Breath 2023   • Amoxicillin  2016   • Ampicillin Nausea Only 2021   • Penicillins Nausea Only 2021              The following portions of the patient's history were reviewed and updated as appropriate: allergies, current medications, past family history, past medical history, past social history, past surgical history, and problem list.     Past Medical History:   Diagnosis Date   • Arthritis    • Asthma    • Hypertension        Past Surgical History:   Procedure Laterality Date   •  SECTION         History reviewed. No pertinent family history. Medications have been verified. Objective     Pulse 64   Temp 98.8 °F (37.1 °C)   Resp 18   SpO2 100%   No LMP recorded. Physical Exam     Physical Exam  Vitals and nursing note reviewed. Constitutional:       Appearance: Normal appearance. She is obese. HENT:      Head: Normocephalic and atraumatic. Cardiovascular:      Rate and Rhythm: Normal rate and regular rhythm. Heart sounds: Normal heart sounds.    Pulmonary:      Effort: Pulmonary effort is normal.      Breath sounds: Normal breath sounds. Musculoskeletal:         General: Tenderness present. Normal range of motion. Comments: Mild tenderness to palpation of paraspinal muscles bilaterally    Skin:     General: Skin is warm and dry. Neurological:      General: No focal deficit present. Mental Status: She is alert and oriented to person, place, and time.    Psychiatric:         Mood and Affect: Mood normal.         Behavior: Behavior normal.

## 2024-04-08 ENCOUNTER — HOSPITAL ENCOUNTER (OUTPATIENT)
Facility: HOSPITAL | Age: 49
Setting detail: OBSERVATION
Discharge: HOME | End: 2024-04-09
Attending: EMERGENCY MEDICINE | Admitting: INTERNAL MEDICINE
Payer: COMMERCIAL

## 2024-04-08 ENCOUNTER — APPOINTMENT (EMERGENCY)
Dept: RADIOLOGY | Facility: HOSPITAL | Age: 49
End: 2024-04-08
Payer: COMMERCIAL

## 2024-04-08 ENCOUNTER — APPOINTMENT (EMERGENCY)
Dept: CT IMAGING | Facility: HOSPITAL | Age: 49
End: 2024-04-08
Payer: COMMERCIAL

## 2024-04-08 DIAGNOSIS — J45.901 ASTHMA EXACERBATION: ICD-10-CM

## 2024-04-08 DIAGNOSIS — R06.00 DYSPNEA: ICD-10-CM

## 2024-04-08 DIAGNOSIS — R09.02 HYPOXIA: Primary | ICD-10-CM

## 2024-04-08 PROBLEM — E78.2 MIXED HYPERLIPIDEMIA: Status: ACTIVE | Noted: 2023-10-16

## 2024-04-08 LAB
2HR DELTA HS TROPONIN: 10 NG/L
4HR DELTA HS TROPONIN: 11 NG/L
ALBUMIN SERPL BCP-MCNC: 3.7 G/DL (ref 3.5–5)
ALP SERPL-CCNC: 66 U/L (ref 34–104)
ALT SERPL W P-5'-P-CCNC: 12 U/L (ref 7–52)
ANION GAP SERPL CALCULATED.3IONS-SCNC: 5 MMOL/L (ref 4–13)
AST SERPL W P-5'-P-CCNC: 18 U/L (ref 13–39)
BACTERIA UR QL AUTO: ABNORMAL /HPF
BASOPHILS # BLD AUTO: 0.05 THOUSANDS/ÂΜL (ref 0–0.1)
BASOPHILS NFR BLD AUTO: 0 % (ref 0–1)
BILIRUB SERPL-MCNC: 0.57 MG/DL (ref 0.2–1)
BILIRUB UR QL STRIP: NEGATIVE
BUN SERPL-MCNC: 14 MG/DL (ref 5–25)
CALCIUM SERPL-MCNC: 8.8 MG/DL (ref 8.4–10.2)
CARDIAC TROPONIN I PNL SERPL HS: 13 NG/L
CARDIAC TROPONIN I PNL SERPL HS: 14 NG/L
CARDIAC TROPONIN I PNL SERPL HS: 3 NG/L
CHLORIDE SERPL-SCNC: 102 MMOL/L (ref 96–108)
CLARITY UR: CLEAR
CO2 SERPL-SCNC: 31 MMOL/L (ref 21–32)
COLOR UR: ABNORMAL
CREAT SERPL-MCNC: 0.83 MG/DL (ref 0.6–1.3)
D DIMER PPP FEU-MCNC: 0.67 UG/ML FEU
EOSINOPHIL # BLD AUTO: 0.18 THOUSAND/ÂΜL (ref 0–0.61)
EOSINOPHIL NFR BLD AUTO: 2 % (ref 0–6)
ERYTHROCYTE [DISTWIDTH] IN BLOOD BY AUTOMATED COUNT: 15.4 % (ref 11.6–15.1)
FLUAV RNA RESP QL NAA+PROBE: NEGATIVE
FLUBV RNA RESP QL NAA+PROBE: NEGATIVE
GFR SERPL CREATININE-BSD FRML MDRD: 82 ML/MIN/1.73SQ M
GLUCOSE SERPL-MCNC: 166 MG/DL (ref 65–140)
GLUCOSE UR STRIP-MCNC: ABNORMAL MG/DL
HCG SERPL QL: ABNORMAL
HCT VFR BLD AUTO: 37.1 % (ref 34.8–46.1)
HGB BLD-MCNC: 11.3 G/DL (ref 11.5–15.4)
HGB UR QL STRIP.AUTO: ABNORMAL
IMM GRANULOCYTES # BLD AUTO: 0.02 THOUSAND/UL (ref 0–0.2)
IMM GRANULOCYTES NFR BLD AUTO: 0 % (ref 0–2)
KETONES UR STRIP-MCNC: NEGATIVE MG/DL
LEUKOCYTE ESTERASE UR QL STRIP: NEGATIVE
LYMPHOCYTES # BLD AUTO: 3.21 THOUSANDS/ÂΜL (ref 0.6–4.47)
LYMPHOCYTES NFR BLD AUTO: 27 % (ref 14–44)
MCH RBC QN AUTO: 26.1 PG (ref 26.8–34.3)
MCHC RBC AUTO-ENTMCNC: 30.5 G/DL (ref 31.4–37.4)
MCV RBC AUTO: 86 FL (ref 82–98)
MONOCYTES # BLD AUTO: 0.73 THOUSAND/ÂΜL (ref 0.17–1.22)
MONOCYTES NFR BLD AUTO: 6 % (ref 4–12)
NEUTROPHILS # BLD AUTO: 7.56 THOUSANDS/ÂΜL (ref 1.85–7.62)
NEUTS SEG NFR BLD AUTO: 65 % (ref 43–75)
NITRITE UR QL STRIP: NEGATIVE
NON-SQ EPI CELLS URNS QL MICRO: ABNORMAL /HPF
NRBC BLD AUTO-RTO: 0 /100 WBCS
PH UR STRIP.AUTO: 5.5 [PH]
PLATELET # BLD AUTO: 316 THOUSANDS/UL (ref 149–390)
PMV BLD AUTO: 10.2 FL (ref 8.9–12.7)
POTASSIUM SERPL-SCNC: 3.4 MMOL/L (ref 3.5–5.3)
PROT SERPL-MCNC: 7.3 G/DL (ref 6.4–8.4)
PROT UR STRIP-MCNC: ABNORMAL MG/DL
RBC # BLD AUTO: 4.33 MILLION/UL (ref 3.81–5.12)
RBC #/AREA URNS AUTO: ABNORMAL /HPF
RSV RNA RESP QL NAA+PROBE: NEGATIVE
SARS-COV-2 RNA RESP QL NAA+PROBE: NEGATIVE
SODIUM SERPL-SCNC: 138 MMOL/L (ref 135–147)
SP GR UR STRIP.AUTO: >=1.05 (ref 1–1.03)
UROBILINOGEN UR STRIP-ACNC: <2 MG/DL
WBC # BLD AUTO: 11.75 THOUSAND/UL (ref 4.31–10.16)
WBC #/AREA URNS AUTO: ABNORMAL /HPF

## 2024-04-08 PROCEDURE — 80053 COMPREHEN METABOLIC PANEL: CPT

## 2024-04-08 PROCEDURE — 0241U HB NFCT DS VIR RESP RNA 4 TRGT: CPT

## 2024-04-08 PROCEDURE — 71275 CT ANGIOGRAPHY CHEST: CPT

## 2024-04-08 PROCEDURE — 94664 DEMO&/EVAL PT USE INHALER: CPT

## 2024-04-08 PROCEDURE — 84484 ASSAY OF TROPONIN QUANT: CPT

## 2024-04-08 PROCEDURE — 71045 X-RAY EXAM CHEST 1 VIEW: CPT

## 2024-04-08 PROCEDURE — 94760 N-INVAS EAR/PLS OXIMETRY 1: CPT

## 2024-04-08 PROCEDURE — 99285 EMERGENCY DEPT VISIT HI MDM: CPT

## 2024-04-08 PROCEDURE — 81001 URINALYSIS AUTO W/SCOPE: CPT

## 2024-04-08 PROCEDURE — 94640 AIRWAY INHALATION TREATMENT: CPT

## 2024-04-08 PROCEDURE — 85025 COMPLETE CBC W/AUTO DIFF WBC: CPT

## 2024-04-08 PROCEDURE — 93005 ELECTROCARDIOGRAM TRACING: CPT

## 2024-04-08 PROCEDURE — 36415 COLL VENOUS BLD VENIPUNCTURE: CPT

## 2024-04-08 PROCEDURE — 84703 CHORIONIC GONADOTROPIN ASSAY: CPT

## 2024-04-08 PROCEDURE — 85379 FIBRIN DEGRADATION QUANT: CPT

## 2024-04-08 PROCEDURE — 99222 1ST HOSP IP/OBS MODERATE 55: CPT | Performed by: INTERNAL MEDICINE

## 2024-04-08 RX ORDER — LIDOCAINE 50 MG/G
2 PATCH TOPICAL DAILY
Status: DISCONTINUED | OUTPATIENT
Start: 2024-04-08 | End: 2024-04-09 | Stop reason: HOSPADM

## 2024-04-08 RX ORDER — METHYLPREDNISOLONE SODIUM SUCCINATE 125 MG/2ML
125 INJECTION, POWDER, LYOPHILIZED, FOR SOLUTION INTRAMUSCULAR; INTRAVENOUS ONCE
Status: COMPLETED | OUTPATIENT
Start: 2024-04-08 | End: 2024-04-08

## 2024-04-08 RX ORDER — TIZANIDINE 4 MG/1
4 TABLET ORAL 4 TIMES DAILY PRN
COMMUNITY
Start: 2024-03-11 | End: 2024-04-09

## 2024-04-08 RX ORDER — GABAPENTIN 400 MG/1
CAPSULE ORAL
COMMUNITY

## 2024-04-08 RX ORDER — IPRATROPIUM BROMIDE AND ALBUTEROL SULFATE 2.5; .5 MG/3ML; MG/3ML
3 SOLUTION RESPIRATORY (INHALATION)
Status: DISCONTINUED | OUTPATIENT
Start: 2024-04-08 | End: 2024-04-08

## 2024-04-08 RX ORDER — LOSARTAN POTASSIUM 50 MG/1
100 TABLET ORAL DAILY
Status: DISCONTINUED | OUTPATIENT
Start: 2024-04-08 | End: 2024-04-09 | Stop reason: HOSPADM

## 2024-04-08 RX ORDER — LEVALBUTEROL INHALATION SOLUTION 1.25 MG/3ML
1.25 SOLUTION RESPIRATORY (INHALATION)
Status: DISCONTINUED | OUTPATIENT
Start: 2024-04-08 | End: 2024-04-09 | Stop reason: HOSPADM

## 2024-04-08 RX ORDER — METHYLPREDNISOLONE SODIUM SUCCINATE 40 MG/ML
40 INJECTION, POWDER, LYOPHILIZED, FOR SOLUTION INTRAMUSCULAR; INTRAVENOUS EVERY 8 HOURS SCHEDULED
Status: DISCONTINUED | OUTPATIENT
Start: 2024-04-08 | End: 2024-04-09 | Stop reason: HOSPADM

## 2024-04-08 RX ORDER — TIZANIDINE HYDROCHLORIDE 6 MG/1
CAPSULE, GELATIN COATED ORAL
COMMUNITY

## 2024-04-08 RX ORDER — TRAZODONE HYDROCHLORIDE 150 MG/1
150 TABLET ORAL
COMMUNITY
Start: 2016-05-25

## 2024-04-08 RX ORDER — MUSCLE RUB CREAM 100; 150 MG/G; MG/G
CREAM TOPICAL 4 TIMES DAILY PRN
Status: DISCONTINUED | OUTPATIENT
Start: 2024-04-08 | End: 2024-04-09 | Stop reason: HOSPADM

## 2024-04-08 RX ORDER — ALBUTEROL SULFATE 90 UG/1
2 AEROSOL, METERED RESPIRATORY (INHALATION) EVERY 4 HOURS PRN
Status: DISCONTINUED | OUTPATIENT
Start: 2024-04-08 | End: 2024-04-09 | Stop reason: HOSPADM

## 2024-04-08 RX ORDER — PRAVASTATIN SODIUM 80 MG/1
80 TABLET ORAL
Status: DISCONTINUED | OUTPATIENT
Start: 2024-04-08 | End: 2024-04-09 | Stop reason: HOSPADM

## 2024-04-08 RX ORDER — ALBUTEROL SULFATE 90 UG/1
2 AEROSOL, METERED RESPIRATORY (INHALATION) EVERY 6 HOURS PRN
Status: DISCONTINUED | OUTPATIENT
Start: 2024-04-08 | End: 2024-04-08 | Stop reason: SDUPTHER

## 2024-04-08 RX ORDER — FAMOTIDINE 20 MG/1
20 TABLET, FILM COATED ORAL 2 TIMES DAILY
Status: DISCONTINUED | OUTPATIENT
Start: 2024-04-08 | End: 2024-04-09 | Stop reason: HOSPADM

## 2024-04-08 RX ORDER — MAGNESIUM SULFATE HEPTAHYDRATE 40 MG/ML
2 INJECTION, SOLUTION INTRAVENOUS ONCE
Status: COMPLETED | OUTPATIENT
Start: 2024-04-08 | End: 2024-04-08

## 2024-04-08 RX ORDER — CARVEDILOL 6.25 MG/1
6.25 TABLET ORAL 2 TIMES DAILY
Status: DISCONTINUED | OUTPATIENT
Start: 2024-04-08 | End: 2024-04-09 | Stop reason: HOSPADM

## 2024-04-08 RX ORDER — KETOROLAC TROMETHAMINE 30 MG/ML
15 INJECTION, SOLUTION INTRAMUSCULAR; INTRAVENOUS ONCE
Status: COMPLETED | OUTPATIENT
Start: 2024-04-08 | End: 2024-04-08

## 2024-04-08 RX ORDER — OXYCODONE HYDROCHLORIDE 5 MG/1
5 TABLET ORAL EVERY 4 HOURS PRN
Status: DISCONTINUED | OUTPATIENT
Start: 2024-04-08 | End: 2024-04-09 | Stop reason: HOSPADM

## 2024-04-08 RX ORDER — ACETAMINOPHEN 325 MG/1
650 TABLET ORAL EVERY 6 HOURS PRN
Status: DISCONTINUED | OUTPATIENT
Start: 2024-04-08 | End: 2024-04-09 | Stop reason: HOSPADM

## 2024-04-08 RX ORDER — MORPHINE SULFATE 4 MG/ML
4 INJECTION, SOLUTION INTRAMUSCULAR; INTRAVENOUS ONCE
Status: COMPLETED | OUTPATIENT
Start: 2024-04-08 | End: 2024-04-08

## 2024-04-08 RX ORDER — MONTELUKAST SODIUM 10 MG/1
1 TABLET ORAL DAILY
COMMUNITY
Start: 2015-06-08

## 2024-04-08 RX ADMIN — LOSARTAN POTASSIUM 100 MG: 50 TABLET, FILM COATED ORAL at 09:08

## 2024-04-08 RX ADMIN — OXYCODONE HYDROCHLORIDE 5 MG: 5 TABLET ORAL at 21:42

## 2024-04-08 RX ADMIN — MAGNESIUM SULFATE HEPTAHYDRATE 2 G: 40 INJECTION, SOLUTION INTRAVENOUS at 03:22

## 2024-04-08 RX ADMIN — METHYLPREDNISOLONE SODIUM SUCCINATE 125 MG: 125 INJECTION, POWDER, FOR SOLUTION INTRAMUSCULAR; INTRAVENOUS at 03:20

## 2024-04-08 RX ADMIN — OXYCODONE HYDROCHLORIDE 5 MG: 5 TABLET ORAL at 11:33

## 2024-04-08 RX ADMIN — LIDOCAINE 5% 2 PATCH: 700 PATCH TOPICAL at 22:44

## 2024-04-08 RX ADMIN — FAMOTIDINE 20 MG: 20 TABLET, FILM COATED ORAL at 09:08

## 2024-04-08 RX ADMIN — LEVALBUTEROL HYDROCHLORIDE 1.25 MG: 1.25 SOLUTION RESPIRATORY (INHALATION) at 20:07

## 2024-04-08 RX ADMIN — PRAVASTATIN SODIUM 80 MG: 40 TABLET ORAL at 17:12

## 2024-04-08 RX ADMIN — METHYLPREDNISOLONE SODIUM SUCCINATE 40 MG: 40 INJECTION, POWDER, FOR SOLUTION INTRAMUSCULAR; INTRAVENOUS at 11:34

## 2024-04-08 RX ADMIN — TRAZODONE HYDROCHLORIDE 150 MG: 50 TABLET ORAL at 22:44

## 2024-04-08 RX ADMIN — IOHEXOL 100 ML: 350 INJECTION, SOLUTION INTRAVENOUS at 04:42

## 2024-04-08 RX ADMIN — MORPHINE SULFATE 4 MG: 4 INJECTION INTRAVENOUS at 06:45

## 2024-04-08 RX ADMIN — IPRATROPIUM BROMIDE AND ALBUTEROL SULFATE 3 ML: 2.5; .5 SOLUTION RESPIRATORY (INHALATION) at 03:18

## 2024-04-08 RX ADMIN — CARVEDILOL 6.25 MG: 12.5 TABLET, FILM COATED ORAL at 09:08

## 2024-04-08 RX ADMIN — ACETAMINOPHEN 650 MG: 325 TABLET, FILM COATED ORAL at 17:12

## 2024-04-08 RX ADMIN — IPRATROPIUM BROMIDE AND ALBUTEROL SULFATE 3 ML: 2.5; .5 SOLUTION RESPIRATORY (INHALATION) at 07:32

## 2024-04-08 RX ADMIN — IPRATROPIUM BROMIDE AND ALBUTEROL SULFATE 3 ML: 2.5; .5 SOLUTION RESPIRATORY (INHALATION) at 14:50

## 2024-04-08 RX ADMIN — IPRATROPIUM BROMIDE AND ALBUTEROL SULFATE 3 ML: .5; 3 SOLUTION RESPIRATORY (INHALATION) at 03:30

## 2024-04-08 RX ADMIN — IPRATROPIUM BROMIDE 0.5 MG: 0.5 SOLUTION RESPIRATORY (INHALATION) at 20:07

## 2024-04-08 RX ADMIN — METHYLPREDNISOLONE SODIUM SUCCINATE 40 MG: 40 INJECTION, POWDER, FOR SOLUTION INTRAMUSCULAR; INTRAVENOUS at 21:35

## 2024-04-08 RX ADMIN — KETOROLAC TROMETHAMINE 15 MG: 30 INJECTION, SOLUTION INTRAMUSCULAR; INTRAVENOUS at 05:30

## 2024-04-08 NOTE — ASSESSMENT & PLAN NOTE
48-year-old female who presented with shortness of breath and diffuse wheezing  Received emergency department with minimal improvement noted to be saturating 88% on room air since improved with 2 to 3 L nasal cannula  Will continue with IV steroids 40 every 8  Will continue with scheduled nebs  Wean oxygen as able  Wean steroids as able

## 2024-04-08 NOTE — ED NOTES
SLIM assigned contacted regarding patients update MAR lisital signs stable.t. Provider to place orders. Patient is c/o of muscle spasms. Provider made aware. Plan of care ongoing .      Kenzie March RN  04/08/24 0513

## 2024-04-08 NOTE — ED PROVIDER NOTES
History  Chief Complaint   Patient presents with    Shortness of Breath     Pt having Asthma at home called EMS and brought her in currently has wheezes in all fields, patient is awake and alert.     The patient is a 48 y.o. female with a history of asthma, arthritis, diabetes, HTN who presents to [facility] with a chief complaint of shortness of breath. Symptoms began yesterday and have been worsening since onset.  Patient currently has no pain and is reporting only chest tightness and shortness of breath. Associated symptoms include cough. Symptoms are aggravated with exertion and alleviating factors include noted. The patient denies fever, chills, night sweats, hemoptysis, hematemesis, syncope, dizziness, lightheadedness, nausea, vomiting, diarrhea, abdominal pain, dysuria, frequency, urgency. No other reported symptoms at this time.  Patient affirms allergies to sumatriptan, amoxicillin, ampicillin, penicillins  She reports being postmenopausal.  Patient subsequently developed left-sided flank pain after coming back from her CT.          History provided by:  Patient   used: No    Shortness of Breath  Associated symptoms: cough and wheezing    Associated symptoms: no abdominal pain, no chest pain, no fever, no headaches, no neck pain, no rash and no vomiting        Prior to Admission Medications   Prescriptions Last Dose Informant Patient Reported? Taking?   ALPRAZolam (XANAX) 1 mg tablet   Yes No   Sig: Take 1 mg by mouth daily as needed   Mounjaro 5 MG/0.5ML   Yes No   Sig: INJECT 5MG UNDER THE SKIN EVERY 7 DAYS   al mag oxide-diphenhydramine-lidocaine viscous (MAGIC MOUTHWASH) 1:1:1 suspension   No No   Sig: Swish and spit 10 mL every 4 (four) hours as needed for mouth pain or discomfort   Patient not taking: Reported on 9/8/2023   albuterol (PROVENTIL HFA,VENTOLIN HFA) 90 mcg/act inhaler   No No   Sig: Inhale 2 puffs every 6 (six) hours as needed for wheezing or shortness  of breath   carvedilol (COREG) 6.25 mg tablet   Yes No   Sig: Take 6.25 mg by mouth 2 (two) times a day   cyclobenzaprine (FLEXERIL) 10 mg tablet   No No   Sig: Take 1 tablet (10 mg total) by mouth 3 (three) times a day as needed for muscle spasms   famotidine (PEPCID) 20 mg tablet   Yes No   Sig: Take 20 mg by mouth 2 (two) times a day   fluticasone (FLONASE) 50 mcg/act nasal spray   No No   Si sprays into each nostril daily As needed for sinus congestion   gabapentin (NEURONTIN) 800 mg tablet   Yes No   Sig: Take 800 mg by mouth 3 (three) times a day   ibuprofen (MOTRIN) 800 mg tablet   No No   Sig: Take 1 tablet (800 mg total) by mouth 2 (two) times a day for 10 days   Patient not taking: Reported on 2023   losartan (COZAAR) 100 MG tablet   Yes No   Sig: Take 100 mg by mouth daily   meclizine (ANTIVERT) 25 mg tablet   No No   Sig: Take 1 tablet (25 mg total) by mouth 3 (three) times a day as needed for dizziness   Patient not taking: Reported on 2023   metFORMIN (GLUCOPHAGE) 500 mg tablet   Yes No   Sig: Take 500 mg by mouth 2 (two) times a day with meals   ondansetron (ZOFRAN) 4 mg tablet   No No   Sig: Take 1 tablet (4 mg total) by mouth every 6 (six) hours as needed for nausea or vomiting (for nausea) for up to 7 days   ondansetron (ZOFRAN-ODT) 4 mg disintegrating tablet   No No   Sig: Take 1 tablet (4 mg total) by mouth every 6 (six) hours as needed for nausea or vomiting   predniSONE 20 mg tablet   No No   Sig: Take 1 tablet (20 mg total) by mouth daily Take 3 tabs for 3 days, then 2 tabs for 3 days, and 1 tab for last 3 days.   Patient not taking: Reported on 2023   rosuvastatin (CRESTOR) 10 MG tablet   Yes No   Sig: Take 10 mg by mouth daily      Facility-Administered Medications: None       Past Medical History:   Diagnosis Date    Arthritis     Asthma     Diabetes mellitus (HCC)     Hypertension        Past Surgical History:   Procedure Laterality Date     SECTION         No  family history on file.  I have reviewed and agree with the history as documented.    E-Cigarette/Vaping    E-Cigarette Use Never User      E-Cigarette/Vaping Substances     Social History     Tobacco Use    Smoking status: Former    Smokeless tobacco: Never   Vaping Use    Vaping status: Never Used   Substance Use Topics    Alcohol use: No    Drug use: No       Review of Systems   Constitutional:  Negative for chills and fever.   HENT:  Negative for congestion, facial swelling, sinus pressure, sinus pain, sneezing and tinnitus.    Eyes:  Negative for pain and redness.   Respiratory:  Positive for cough, shortness of breath and wheezing. Negative for apnea, choking, chest tightness and stridor.    Cardiovascular:  Negative for chest pain, palpitations and leg swelling.   Gastrointestinal:  Negative for abdominal pain, anal bleeding, blood in stool, constipation, diarrhea, nausea and vomiting.   Genitourinary:  Negative for decreased urine volume, dyspareunia, dysuria, frequency, hematuria, urgency, vaginal bleeding and vaginal pain.   Musculoskeletal:  Negative for arthralgias, back pain, neck pain and neck stiffness.   Skin:  Negative for color change, pallor, rash and wound.   Neurological:  Negative for dizziness, tremors, syncope, facial asymmetry, speech difficulty, weakness and headaches.       Physical Exam  Physical Exam  Vitals reviewed.   Constitutional:       General: She is not in acute distress.     Appearance: She is well-developed. She is obese. She is not ill-appearing.   HENT:      Head: Normocephalic and atraumatic.      Mouth/Throat:      Mouth: Mucous membranes are moist.      Pharynx: Oropharynx is clear.   Eyes:      Pupils: Pupils are equal, round, and reactive to light.   Cardiovascular:      Rate and Rhythm: Tachycardia present.   Pulmonary:      Effort: Pulmonary effort is normal. No tachypnea, bradypnea, accessory muscle usage or respiratory distress.      Breath sounds: Examination of the  right-upper field reveals wheezing. Examination of the left-upper field reveals wheezing. Examination of the right-middle field reveals wheezing. Examination of the left-middle field reveals wheezing. Examination of the right-lower field reveals wheezing. Examination of the left-lower field reveals wheezing. Wheezing present. No decreased breath sounds, rhonchi or rales.   Abdominal:      Palpations: Abdomen is soft.      Tenderness: There is no abdominal tenderness.   Musculoskeletal:         General: Normal range of motion.      Cervical back: Normal range of motion and neck supple.      Right lower leg: No tenderness. No edema.      Left lower leg: No tenderness. No edema.   Skin:     General: Skin is warm and dry.      Capillary Refill: Capillary refill takes less than 2 seconds.      Coloration: Skin is not cyanotic.      Findings: No ecchymosis.   Neurological:      General: No focal deficit present.      Mental Status: She is alert and oriented to person, place, and time.         Vital Signs  ED Triage Vitals   Temperature Pulse Respirations Blood Pressure SpO2   04/08/24 0307 04/08/24 0307 04/08/24 0307 04/08/24 0307 04/08/24 0307   98.2 °F (36.8 °C) 103 (!) 23 140/75 90 %      Temp src Heart Rate Source Patient Position - Orthostatic VS BP Location FiO2 (%)   -- 04/08/24 0307 04/08/24 0307 04/08/24 0307 --    Monitor Sitting Right arm       Pain Score       04/08/24 0731       No Pain           Vitals:    04/08/24 0307 04/08/24 0535 04/08/24 0730 04/08/24 0745   BP: 140/75  135/85 135/85   Pulse: 103 95 94 99   Patient Position - Orthostatic VS: Sitting            Visual Acuity      ED Medications  Medications   ipratropium-albuterol (DUO-NEB) 0.5-2.5 mg/3 mL inhalation solution 3 mL (3 mL Nebulization Given 4/8/24 0732)   ipratropium-albuterol (DUO-NEB) 0.5-2.5 mg/3 mL inhalation solution 3 mL (3 mL Nebulization Not Given 4/8/24 0756)   ipratropium-albuterol (DUO-NEB) 0.5-2.5 mg/3 mL inhalation solution 3  mL (has no administration in time range)   magnesium sulfate 2 g/50 mL IVPB (premix) 2 g (0 g Intravenous Stopped 4/8/24 0349)   methylPREDNISolone sodium succinate (Solu-MEDROL) injection 125 mg (125 mg Intravenous Given 4/8/24 0320)   iohexol (OMNIPAQUE) 350 MG/ML injection (MULTI-DOSE) 100 mL (100 mL Intravenous Given 4/8/24 0442)   ketorolac (TORADOL) injection 15 mg (15 mg Intravenous Given 4/8/24 0530)   morphine injection 4 mg (4 mg Intravenous Given 4/8/24 0645)       Diagnostic Studies  Results Reviewed       Procedure Component Value Units Date/Time    HS Troponin I 4hr [[id number]]  (Normal) Collected: 04/08/24 0648    Lab Status: Final result Specimen: Blood from Arm, Left Updated: 04/08/24 0714     hs TnI 4hr 14 ng/L      Delta 4hr hsTnI 11 ng/L     HS Troponin I 2hr [[id number]]  (Normal) Collected: 04/08/24 0530    Lab Status: Final result Specimen: Blood from Arm, Left Updated: 04/08/24 0603     hs TnI 2hr 13 ng/L      Delta 2hr hsTnI 10 ng/L     UA w Reflex to Microscopic w Reflex to Culture [[id number]]     Lab Status: No result Specimen: Urine     hCG, qualitative pregnancy [[id number]]  (Abnormal) Collected: 04/08/24 0315    Lab Status: Final result Specimen: Blood from Arm, Left Updated: 04/08/24 0436     Preg, Serum Borderline    FLU/RSV/COVID - if FLU/RSV clinically relevant [[id number]]  (Normal) Collected: 04/08/24 0315    Lab Status: Final result Specimen: Nares from Nose Updated: 04/08/24 0405     SARS-CoV-2 Negative     INFLUENZA A PCR Negative     INFLUENZA B PCR Negative     RSV PCR Negative    Narrative:      FOR PEDIATRIC PATIENTS - copy/paste COVID Guidelines URL to browser: [email or web address]    SARS-CoV-2 assay is a Nucleic Acid Amplification assay intended for the  qualitative detection of nucleic acid from SARS-CoV-2 in nasopharyngeal  swabs. Results are for the presumptive identification of SARS-CoV-2 RNA.    Positive results  are indicative of infection with SARS-CoV-2, the virus  causing COVID-19, but do not rule out bacterial infection or co-infection  with other viruses. Laboratories within the [place] and its  territories are required to report all positive results to the appropriate  public health authorities. Negative results do not preclude SARS-CoV-2  infection and should not be used as the sole basis for treatment or other  patient management decisions. Negative results must be combined with  clinical observations, patient history, and epidemiological information.  This test has not been FDA cleared or approved.    This test has been authorized by FDA under an Emergency Use Authorization  (EUA). This test is only authorized for the duration of time the  declaration that circumstances exist justifying the authorization of the  emergency use of an in vitro diagnostic tests for detection of SARS-CoV-2  virus and/or diagnosis of COVID-19 infection under section 564(b)(1) of  the Act, 21 U.S.C. 360bbb-3(b)(1), unless the authorization is terminated  or revoked sooner. The test has been validated but independent review by FDA  and CLIA is pending.    Test performed using [employer]: This RT-PCR assay targets N2,  a region unique to SARS-CoV-2. A conserved region in the E-gene was chosen  for pan-Sarbecovirus detection which includes SARS-CoV-2.    According to CMS-2020-01-R, this platform meets the definition of high-throughput technology.    HS Troponin 0hr (reflex protocol) [[id number]]  (Normal) Collected: 04/08/24 0315    Lab Status: Final result Specimen: Blood from Arm, Left Updated: 04/08/24 0350     hs TnI 0hr 3 ng/L     D-Dimer [[id number]]  (Abnormal) Collected: 04/08/24 0315    Lab Status: Final result Specimen: Blood from Arm, Left Updated: 04/08/24 0345     D-Dimer, Quant 0.67 ug/ml FEU     Narrative:      In the evaluation for possible pulmonary embolism, in the appropriate (Well's Score of 4 or less) patient,  the age adjusted d-dimer cutoff for this patient can be calculated as:    Age x 0.01 (in ug/mL) for Age-adjusted D-dimer exclusion threshold for a patient over 50 years.    Comprehensive metabolic panel [[id number]]  (Abnormal) Collected: 04/08/24 0315    Lab Status: Final result Specimen: Blood from Arm, Left Updated: 04/08/24 0343     Sodium 138 mmol/L      Potassium 3.4 mmol/L      Chloride 102 mmol/L      CO2 31 mmol/L      ANION GAP 5 mmol/L      BUN 14 mg/dL      Creatinine 0.83 mg/dL      Glucose 166 mg/dL      Calcium 8.8 mg/dL      AST 18 U/L      ALT 12 U/L      Alkaline Phosphatase 66 U/L      Total Protein 7.3 g/dL      Albumin 3.7 g/dL      Total Bilirubin 0.57 mg/dL      eGFR 82 ml/min/1.73sq m     Narrative:      National Kidney Disease Foundation guidelines for Chronic Kidney Disease (CKD):     Stage 1 with normal or high GFR (GFR > 90 mL/min/1.73 square meters)    Stage 2 Mild CKD (GFR = 60-89 mL/min/1.73 square meters)    Stage 3A Moderate CKD (GFR = 45-59 mL/min/1.73 square meters)    Stage 3B Moderate CKD (GFR = 30-44 mL/min/1.73 square meters)    Stage 4 Severe CKD (GFR = 15-29 mL/min/1.73 square meters)    Stage 5 End Stage CKD (GFR <15 mL/min/1.73 square meters)  Note: GFR calculation is accurate only with a steady state creatinine    CBC and differential [[id number]]  (Abnormal) Collected: 04/08/24 0315    Lab Status: Final result Specimen: Blood from Arm, Left Updated: 04/08/24 0328     WBC 11.75 Thousand/uL      RBC 4.33 Million/uL      Hemoglobin 11.3 g/dL      Hematocrit 37.1 %      MCV 86 fL      MCH 26.1 pg      MCHC 30.5 g/dL      RDW 15.4 %      MPV 10.2 fL      Platelets 316 Thousands/uL      nRBC 0 /100 WBCs      Neutrophils Relative 65 %      Immature Grans % 0 %      Lymphocytes Relative 27 %      Monocytes Relative 6 %      Eosinophils Relative 2 %      Basophils Relative 0 %      Neutrophils Absolute 7.56 Thousands/µL      Absolute Immature Grans 0.02 Thousand/uL      Absolute  Lymphocytes 3.21 Thousands/µL      Absolute Monocytes 0.73 Thousand/µL      Eosinophils Absolute 0.18 Thousand/µL      Basophils Absolute 0.05 Thousands/µL                    CTA ED chest PE Study   Final Result by Mitchell Iniguez MD (04/08 0538)      No intraluminal filling defect to suggest an acute pulmonary embolus.      Clear lungs.                  Workstation performed: [id number]         XR chest 1 view portable    (Results Pending)              Procedures  Procedures         ED Course  ED Course as of 04/08/24 0818 Mon Apr 08, 2024 0524 Patient states that she is postmenopausal     0541 CTA ED chest PE Study  No intraluminal filling defect to suggest an acute pulmonary embolus.     Clear lungs.   0604 Delta 2hr hsTnI: 10   0705 Patient was trialed off of the oxygen, patient dropped into 88% on room air at rest.   0714 Delta 4hr hsTnI: 11             HEART Risk Score      Flowsheet Row Most Recent Value   Heart Score Risk Calculator    History 0 Filed at: 04/08/2024 0818   ECG 1 Filed at: 04/08/2024 0818   Age 1 Filed at: 04/08/2024 0818   Risk Factors 1 Filed at: 04/08/2024 0818   Troponin 0 Filed at: 04/08/2024 0818   HEART Score 3 Filed at: 04/08/2024 0818                  PERC Rule for PE      Flowsheet Row Most Recent Value   PERC Rule for PE    Age >=50 1 Filed at: 04/08/2024 0411   HR >=100 1 Filed at: 04/08/2024 0411   O2 Sat on room air < 95% 1 Filed at: 04/08/2024 0411   History of PE or DVT 0 Filed at: 04/08/2024 0411   Recent trauma or surgery 0 Filed at: 04/08/2024 0411   Hemoptysis 0 Filed at: 04/08/2024 0411   Exogenous estrogen 0 Filed at: 04/08/2024 0411   Unilateral leg swelling 0 Filed at: 04/08/2024 0411   PERC Rule for PE Results 3 Filed at: 04/08/2024 0411                    Wells' Criteria for PE      Flowsheet Row Most Recent Value   Wells' Criteria for PE    Clinical signs and symptoms of DVT 0 Filed at: 04/08/2024 0412   PE is primary diagnosis or equally likely 0 Filed at:  04/08/2024 0412   HR >100 1.5 Filed at: 04/08/2024 0412   Immobilization at least 3 days or Surgery in the previous 4 weeks 0 Filed at: 04/08/2024 0412   Previous, objectively diagnosed PE or DVT 0 Filed at: 04/08/2024 0412   Hemoptysis 0 Filed at: 04/08/2024 0412   Malignancy with treatment within 6 months or palliative 0 Filed at: 04/08/2024 0412   Wells' Criteria Total 1.5 Filed at: 04/08/2024 0412                  Medical Decision Making  This patient presents with dyspnea, most likely secondary to asthma. Presentation not consistent with acute cardiac etiologies to include ACS (non ischemic ekg, unremarkable trop), CHF, pericardial effusion / tamponade . Presentation not consistent with acute respiratory etiologies to include acute PE due to negative CTA PE study, pneumothorax, COPD exacerbation, allergic etiologies, or infectious etiologies such as PNA. Presentation also not consistent with non-cardiopulmonary causes to include toxidromes, metabolic etiologies such as acidemia or electrolyte derangements, sepsis, neurologic causes (i.e. demyelinating diseases). Patient was treated with duonebs, magnesium, methylprednisolone and some fluid.  Patient had improved work of breathing and less wheezing on auscultation.  Patient still requiring oxygen that she does not use at home.  Patient trialed off oxygen and dropped into the 87 to 88% on room air at rest.  Discussed possibly staying due to hypoxia.  Patient understands agrees with treatment plan.  Discussed case with toan will admit for hypoxia secondary to asthma exacerbation.    Problems Addressed:  Asthma exacerbation: acute illness or injury  Dyspnea: acute illness or injury  Hypoxia: acute illness or injury    Amount and/or Complexity of Data Reviewed  Labs: ordered. Decision-making details documented in ED Course.  Radiology: ordered. Decision-making details documented in ED Course.    Risk  Prescription drug management.  Decision regarding  hospitalization.             Disposition  Final diagnoses:   Hypoxia   Asthma exacerbation   Dyspnea     Time reflects when diagnosis was documented in both MDM as applicable and the Disposition within this note       Time User Action Codes Description Comment    4/8/2024  7:24 AM Felipe Raza [R09.02] Hypoxia     4/8/2024  7:24 AM Felipe Raza [J45.901] Asthma exacerbation     4/8/2024  7:24 AM Felipe Raza [R06.00] Dyspnea           ED Disposition       ED Disposition   Admit    Condition   Stable    Date/Time   Mon Apr 8, 2024 0724    Comment   Case was discussed with CECY and the patient's admission status was agreed to be Admission Status: observation status to the service of Dr. Koenig .               Follow-up Information    None         Patient's Medications   Discharge Prescriptions    No medications on file       No discharge procedures on file.    PDMP Review       None            ED Provider  Electronically Signed by             Felipe Raza PA-C  04/08/24 0819

## 2024-04-08 NOTE — RESPIRATORY THERAPY NOTE
RT Protocol Note  Vale Felton 48 y.o. female MRN: [id number]  Unit/Bed#: ED 19 Encounter: [id number]    Assessment    Principal Problem:    Acute asthma exacerbation  Active Problems:    Essential hypertension    Generalized anxiety disorder    Mixed hyperlipidemia      Home Pulmonary Medications:     04/08/24 1449   Respiratory Protocol   Protocol Initiated? Yes   Protocol Selection Respiratory   Language Barrier? No   Medical & Social History Reviewed? Yes   Diagnostic Studies Reviewed? Yes   Physical Assessment Performed? Yes   Respiratory Plan Mild Distress pathway   Respiratory Assessment   Assessment Type Pre-treatment   General Appearance Alert;Awake   Respiratory Pattern Normal   Chest Assessment Chest expansion symmetrical   Resp Comments patient admitted for asthma, bbs diminshed with few exp wheezes, takes albuterol mdi prn at home but would prefer nebs at this time, spo2 92% 3L,   O2 Device nc            Past Medical History:   Diagnosis Date    Arthritis     Asthma     Diabetes mellitus (HCC)     Hypertension      Social History     Socioeconomic History    Marital status: /Civil Union     Spouse name: Not on file    Number of children: Not on file    Years of education: Not on file    Highest education level: Not on file   Occupational History    Not on file   Tobacco Use    Smoking status: Former    Smokeless tobacco: Never   Vaping Use    Vaping status: Never Used   Substance and Sexual Activity    Alcohol use: No    Drug use: No    Sexual activity: Not on file   Other Topics Concern    Not on file   Social History Narrative    Not on file     Social Determinants of Health     Financial Resource Strain: Not on file   Food Insecurity: Not on file   Transportation Needs: Not on file   Physical Activity: Not on file   Stress: Not on file   Social Connections: Not on file   Intimate Partner Violence: Not on file   Housing Stability: Not on file       Subjective         Objective    Physical Exam:    Assessment Type: Pre-treatment  General Appearance: Alert, Awake  Respiratory Pattern: Normal  Chest Assessment: Chest expansion symmetrical  O2 Device: nc    Vitals:  Blood pressure 155/75, pulse 89, temperature 98.2 °F (36.8 °C), resp. rate (!) 25, SpO2 93%.          Imaging and other studies: I have personally reviewed pertinent reports.      O2 Device: nc     Plan    Respiratory Plan: Mild Distress pathway        Resp Comments: patient admitted for asthma, bbs diminshed with few exp wheezes, takes albuterol mdi prn at home but would prefer nebs at this time, spo2 92% 3L,

## 2024-04-09 VITALS
OXYGEN SATURATION: 94 % | SYSTOLIC BLOOD PRESSURE: 166 MMHG | RESPIRATION RATE: 19 BRPM | BODY MASS INDEX: 47.99 KG/M2 | HEART RATE: 81 BPM | DIASTOLIC BLOOD PRESSURE: 103 MMHG | WEIGHT: 281.09 LBS | HEIGHT: 64 IN | TEMPERATURE: 97.8 F

## 2024-04-09 LAB
ANION GAP SERPL CALCULATED.3IONS-SCNC: 8 MMOL/L (ref 4–13)
ATRIAL RATE: 91 BPM
ATRIAL RATE: 93 BPM
ATRIAL RATE: 96 BPM
BASOPHILS # BLD AUTO: 0.01 THOUSANDS/ÂΜL (ref 0–0.1)
BASOPHILS NFR BLD AUTO: 0 % (ref 0–1)
BUN SERPL-MCNC: 18 MG/DL (ref 5–25)
CALCIUM SERPL-MCNC: 9.3 MG/DL (ref 8.4–10.2)
CHLORIDE SERPL-SCNC: 103 MMOL/L (ref 96–108)
CO2 SERPL-SCNC: 27 MMOL/L (ref 21–32)
CREAT SERPL-MCNC: 0.76 MG/DL (ref 0.6–1.3)
EOSINOPHIL # BLD AUTO: 0 THOUSAND/ÂΜL (ref 0–0.61)
EOSINOPHIL NFR BLD AUTO: 0 % (ref 0–6)
ERYTHROCYTE [DISTWIDTH] IN BLOOD BY AUTOMATED COUNT: 15.3 % (ref 11.6–15.1)
GFR SERPL CREATININE-BSD FRML MDRD: 91 ML/MIN/1.73SQ M
GLUCOSE SERPL-MCNC: 196 MG/DL (ref 65–140)
HCT VFR BLD AUTO: 39.2 % (ref 34.8–46.1)
HGB BLD-MCNC: 12.2 G/DL (ref 11.5–15.4)
IMM GRANULOCYTES # BLD AUTO: 0.1 THOUSAND/UL (ref 0–0.2)
IMM GRANULOCYTES NFR BLD AUTO: 1 % (ref 0–2)
LYMPHOCYTES # BLD AUTO: 1.2 THOUSANDS/ÂΜL (ref 0.6–4.47)
LYMPHOCYTES NFR BLD AUTO: 8 % (ref 14–44)
MCH RBC QN AUTO: 25.8 PG (ref 26.8–34.3)
MCHC RBC AUTO-ENTMCNC: 31.1 G/DL (ref 31.4–37.4)
MCV RBC AUTO: 83 FL (ref 82–98)
MONOCYTES # BLD AUTO: 0.21 THOUSAND/ÂΜL (ref 0.17–1.22)
MONOCYTES NFR BLD AUTO: 1 % (ref 4–12)
NEUTROPHILS # BLD AUTO: 13.93 THOUSANDS/ÂΜL (ref 1.85–7.62)
NEUTS SEG NFR BLD AUTO: 90 % (ref 43–75)
NRBC BLD AUTO-RTO: 0 /100 WBCS
P AXIS: 36 DEGREES
P AXIS: 78 DEGREES
P AXIS: 92 DEGREES
PLATELET # BLD AUTO: 380 THOUSANDS/UL (ref 149–390)
PMV BLD AUTO: 10.2 FL (ref 8.9–12.7)
POTASSIUM SERPL-SCNC: 4.3 MMOL/L (ref 3.5–5.3)
PR INTERVAL: 160 MS
PR INTERVAL: 166 MS
PR INTERVAL: 172 MS
QRS AXIS: 76 DEGREES
QRS AXIS: 82 DEGREES
QRS AXIS: 83 DEGREES
QRSD INTERVAL: 86 MS
QT INTERVAL: 376 MS
QT INTERVAL: 382 MS
QT INTERVAL: 390 MS
QTC INTERVAL: 474 MS
QTC INTERVAL: 475 MS
QTC INTERVAL: 479 MS
RBC # BLD AUTO: 4.72 MILLION/UL (ref 3.81–5.12)
SODIUM SERPL-SCNC: 138 MMOL/L (ref 135–147)
T WAVE AXIS: -20 DEGREES
T WAVE AXIS: 48 DEGREES
T WAVE AXIS: 62 DEGREES
VENTRICULAR RATE: 91 BPM
VENTRICULAR RATE: 93 BPM
VENTRICULAR RATE: 96 BPM
WBC # BLD AUTO: 15.45 THOUSAND/UL (ref 4.31–10.16)

## 2024-04-09 PROCEDURE — 80048 BASIC METABOLIC PNL TOTAL CA: CPT | Performed by: INTERNAL MEDICINE

## 2024-04-09 PROCEDURE — 94760 N-INVAS EAR/PLS OXIMETRY 1: CPT

## 2024-04-09 PROCEDURE — 85025 COMPLETE CBC W/AUTO DIFF WBC: CPT | Performed by: INTERNAL MEDICINE

## 2024-04-09 PROCEDURE — 93010 ELECTROCARDIOGRAM REPORT: CPT | Performed by: INTERNAL MEDICINE

## 2024-04-09 PROCEDURE — 94640 AIRWAY INHALATION TREATMENT: CPT

## 2024-04-09 PROCEDURE — 94664 DEMO&/EVAL PT USE INHALER: CPT

## 2024-04-09 PROCEDURE — 99239 HOSP IP/OBS DSCHRG MGMT >30: CPT | Performed by: INTERNAL MEDICINE

## 2024-04-09 RX ORDER — PREDNISONE 10 MG/1
TABLET ORAL DAILY
Qty: 30 TABLET | Refills: 0 | Status: SHIPPED | OUTPATIENT
Start: 2024-04-09 | End: 2024-04-21

## 2024-04-09 RX ADMIN — LIDOCAINE 5% 2 PATCH: 700 PATCH TOPICAL at 09:37

## 2024-04-09 RX ADMIN — OXYCODONE HYDROCHLORIDE 5 MG: 5 TABLET ORAL at 09:40

## 2024-04-09 RX ADMIN — IPRATROPIUM BROMIDE 0.5 MG: 0.5 SOLUTION RESPIRATORY (INHALATION) at 07:19

## 2024-04-09 RX ADMIN — LOSARTAN POTASSIUM 100 MG: 50 TABLET, FILM COATED ORAL at 09:37

## 2024-04-09 RX ADMIN — LEVALBUTEROL HYDROCHLORIDE 1.25 MG: 1.25 SOLUTION RESPIRATORY (INHALATION) at 07:19

## 2024-04-09 RX ADMIN — CARVEDILOL 6.25 MG: 12.5 TABLET, FILM COATED ORAL at 09:37

## 2024-04-09 RX ADMIN — METHYLPREDNISOLONE SODIUM SUCCINATE 40 MG: 40 INJECTION, POWDER, FOR SOLUTION INTRAMUSCULAR; INTRAVENOUS at 05:58

## 2024-04-09 RX ADMIN — IPRATROPIUM BROMIDE 0.5 MG: 0.5 SOLUTION RESPIRATORY (INHALATION) at 13:25

## 2024-04-09 RX ADMIN — LEVALBUTEROL HYDROCHLORIDE 1.25 MG: 1.25 SOLUTION RESPIRATORY (INHALATION) at 13:25

## 2024-04-09 RX ADMIN — FAMOTIDINE 20 MG: 20 TABLET, FILM COATED ORAL at 09:37

## 2024-04-09 NOTE — H&P
[facility]  H&P  Name: Vale Felton 48 y.o. female I MRN: [id number]  Unit/Bed#: -01 I Date of Admission: 4/8/2024   Date of Service: 4/9/2024 I Hospital Day: 0      Assessment/Plan   * Acute asthma exacerbation  Assessment & Plan  48-year-old female who presented with shortness of breath and diffuse wheezing  Received emergency department with minimal improvement noted to be saturating 88% on room air since improved with 2 to 3 L nasal cannula  Will continue with IV steroids 40 every 8  Will continue with scheduled nebs  Wean oxygen as able  Wean steroids as able    Mixed hyperlipidemia  Assessment & Plan  Continue statin    Generalized anxiety disorder  Assessment & Plan  Xanax as needed    Essential hypertension  Assessment & Plan  BP currently controlled  Continue Coreg and losartan           VTE Prophylaxis: Heparin  / sequential compression device   Code Status: full code  POLST: There is no POLST form on file for this patient (pre-hospital)  Discussion with family: patient    Anticipated Length of Stay:  Patient will be admitted on an Observation basis with an anticipated length of stay of  < 2 midnights.   Justification for Hospital Stay: Asthma exacerbation    Total Time for Visit, including Counseling / Coordination of Care: 60 minutes.  Greater than 50% of this total time spent on direct patient counseling and coordination of care.    Chief Complaint:   SOB    History of Present Illness:    Vale Felton is a 48 y.o. female  with past medical history significant hypertension, generalized anxiety, hyperlipidemia, asthma and is present with shortness of breath and wheezing.  Reports shortness of breath and wheezing for the past couple days without significant improvement with home inhalers.  Otherwise denies any acute complaints.  Nuys fevers, chills, chest pain or any other complaints    Review of Systems:    Review of Systems   Constitutional:  Negative for activity change,  appetite change, chills, diaphoresis, fever and unexpected weight change.   HENT:  Negative for congestion, facial swelling and rhinorrhea.    Eyes:  Negative for photophobia and visual disturbance.   Respiratory:  Negative for cough, shortness of breath and wheezing.    Cardiovascular:  Negative for chest pain and palpitations.   Gastrointestinal:  Negative for abdominal pain, blood in stool, constipation, diarrhea, nausea and vomiting.   Genitourinary:  Negative for decreased urine volume, difficulty urinating, dysuria, flank pain, frequency, hematuria and urgency.   Musculoskeletal:  Negative for arthralgias, back pain, joint swelling and myalgias.   Neurological:  Negative for dizziness, syncope, facial asymmetry, light-headedness, numbness and headaches.   Psychiatric/Behavioral:  Negative for confusion and decreased concentration. The patient is not nervous/anxious.        Past Medical and Surgical History:     Past Medical History:   Diagnosis Date    Arthritis     Asthma     Diabetes mellitus (HCC)     Hypertension        Past Surgical History:   Procedure Laterality Date     SECTION         Meds/Allergies:    Prior to Admission medications    Medication Sig Start Date End Date Taking? Authorizing Provider   albuterol (PROVENTIL HFA,VENTOLIN HFA) 90 mcg/act inhaler Inhale 2 puffs every 6 (six) hours as needed for wheezing or shortness of breath 18  Yes Kip Guerrero MD   ALPRAZolam (XANAX) 1 mg tablet Take 1 mg by mouth daily as needed 12  Yes Historical Provider, MD   carvedilol (COREG) 6.25 mg tablet Take 6.25 mg by mouth 2 (two) times a day 23  Yes Historical Provider, MD   famotidine (PEPCID) 20 mg tablet Take 20 mg by mouth 2 (two) times a day 23  Yes Historical Provider, MD   fluticasone (FLONASE) 50 mcg/act nasal spray 2 sprays into each nostril daily As needed for sinus congestion 18  Yes Kip Guerrero MD   gabapentin (NEURONTIN) 800 mg tablet Take 800 mg by mouth  3 (three) times a day 7/17/23  Yes Historical Provider, MD   losartan (COZAAR) 100 MG tablet Take 100 mg by mouth daily 8/2/23  Yes Historical Provider, MD   metFORMIN (GLUCOPHAGE) 500 mg tablet Take 500 mg by mouth 2 (two) times a day with meals 8/16/23  Yes Historical Provider, MD   montelukast (SINGULAIR) 10 mg tablet Take 1 tablet by mouth daily 6/8/15  Yes Historical Provider, MD   rosuvastatin (CRESTOR) 10 MG tablet Take 10 mg by mouth daily 6/9/23  Yes Historical Provider, MD   TiZANidine (ZANAFLEX) 6 MG capsule tizanidine hcl 6 mg caps   Yes Historical Provider, MD   traZODone (DESYREL) 150 mg tablet Take 150 mg by mouth daily at bedtime 5/25/16  Yes Historical Provider, MD   al mag oxide-diphenhydramine-lidocaine viscous (MAGIC MOUTHWASH) 1:1:1 suspension Swish and spit 10 mL every 4 (four) hours as needed for mouth pain or discomfort  Patient not taking: Reported on 9/8/2023 6/22/21   Mary Lou Vasquez MD   CARVEDILOL & DIET MANAGE PROD PO     Historical Provider, MD   cyclobenzaprine (FLEXERIL) 10 mg tablet Take 1 tablet (10 mg total) by mouth 3 (three) times a day as needed for muscle spasms  Patient not taking: Reported on 4/8/2024 9/8/23   Tressa Thompson PA-C   gabapentin (NEURONTIN) 400 mg capsule gabapentin 400 mg caps    Historical Provider, MD   ibuprofen (MOTRIN) 800 mg tablet Take 1 tablet (800 mg total) by mouth 2 (two) times a day for 10 days  Patient not taking: Reported on 9/8/2023 11/19/22 11/29/22  Shena Wray,    losartan (COZAAR) 6.25 mg tablet     Historical Provider, MD   meclizine (ANTIVERT) 25 mg tablet Take 1 tablet (25 mg total) by mouth 3 (three) times a day as needed for dizziness  Patient not taking: Reported on 9/8/2023 12/1/22   Mary Lou Vasquez MD   Mounjaro 5 MG/0.5ML INJECT 5MG UNDER THE SKIN EVERY 7 DAYS 8/18/23   Historical Provider, MD   ondansetron (ZOFRAN) 4 mg tablet Take 1 tablet (4 mg total) by mouth every 6 (six) hours as needed  "for nausea or vomiting (for nausea) for up to 7 days 4/1/20 4/8/20  Sherlyn West PA-C   ondansetron (ZOFRAN-ODT) 4 mg disintegrating tablet Take 1 tablet (4 mg total) by mouth every 6 (six) hours as needed for nausea or vomiting 12/1/22   Mary Lou Vasquez MD   predniSONE 20 mg tablet Take 1 tablet (20 mg total) by mouth daily Take 3 tabs for 3 days, then 2 tabs for 3 days, and 1 tab for last 3 days.  Patient not taking: Reported on 9/8/2023 3/25/18   Roshan Michel PA-C   tiZANidine (ZANAFLEX) 4 mg tablet Take 4 mg by mouth 4 (four) times a day as needed for muscle spasms  Patient not taking: Reported on 4/8/2024 3/11/24   Historical Provider, MD FERRO have reviewed home medications with patient personally.    Allergies:   Allergies   Allergen Reactions    Sumatriptan Shortness Of Breath    Amoxicillin     Ampicillin Nausea Only    Penicillins Nausea Only       Social History:     Marital Status: /Civil Union     Patient Pre-hospital Living Situation: home  Patient Pre-hospital Level of Mobility: independent  Patient Pre-hospital Diet Restrictions: none  Substance Use History:   Social History     Substance and Sexual Activity   Alcohol Use Never     Social History     Tobacco Use   Smoking Status Former   Smokeless Tobacco Never     Social History     Substance and Sexual Activity   Drug Use No       Family History:    History reviewed. No pertinent family history.    Physical Exam:     Vitals:   Blood Pressure: 147/92 (04/09/24 0734)  Pulse: 81 (04/08/24 2119)  Temperature: 98.3 °F (36.8 °C) (04/08/24 2119)  Temp Source: Oral (04/08/24 2119)  Respirations: 19 (04/08/24 2119)  Height: 5' 4\" (162.6 cm) (04/08/24 2119)  Weight - Scale: 127 kg (281 lb 1.4 oz) (04/08/24 2119)  SpO2: 94 % (04/09/24 0721)    Physical Exam  Constitutional:       General: She is not in acute distress.     Appearance: She is well-developed. She is not diaphoretic.   HENT:      Head: Normocephalic and atraumatic. "      Nose: Nose normal.      Mouth/Throat:      Pharynx: No oropharyngeal exudate.   Eyes:      General: No scleral icterus.        Right eye: No discharge.         Left eye: No discharge.      Conjunctiva/sclera: Conjunctivae normal.      Pupils: Pupils are equal, round, and reactive to light.   Neck:      Thyroid: No thyromegaly.      Vascular: No JVD.   Cardiovascular:      Rate and Rhythm: Normal rate and regular rhythm.      Heart sounds: Normal heart sounds. No murmur heard.     No friction rub. No gallop.   Pulmonary:      Effort: Pulmonary effort is normal. No respiratory distress.      Breath sounds: Normal breath sounds. No wheezing or rales.   Chest:      Chest wall: No tenderness.   Abdominal:      General: Bowel sounds are normal. There is no distension.      Palpations: Abdomen is soft.      Tenderness: There is no abdominal tenderness. There is no guarding or rebound.   Musculoskeletal:         General: No tenderness or deformity. Normal range of motion.      Cervical back: Normal range of motion and neck supple.   Skin:     General: Skin is warm and dry.      Findings: No erythema or rash.   Neurological:      Mental Status: She is alert. Mental status is at baseline.      Cranial Nerves: No cranial nerve deficit.      Sensory: No sensory deficit.      Motor: No abnormal muscle tone.      Coordination: Coordination normal.           Additional Data:     Lab Results: I have personally reviewed pertinent reports.      Results from last 7 days   Lab Units 04/09/24  0449   WBC Thousand/uL 15.45*   HEMOGLOBIN g/dL 12.2   HEMATOCRIT % 39.2   PLATELETS Thousands/uL 380   NEUTROS PCT % 90*   LYMPHS PCT % 8*   MONOS PCT % 1*   EOS PCT % 0     Results from last 7 days   Lab Units 04/09/24  0449 04/08/24  0315   SODIUM mmol/L 138 138   POTASSIUM mmol/L 4.3 3.4*   CHLORIDE mmol/L 103 102   CO2 mmol/L 27 31   BUN mg/dL 18 14   CREATININE mg/dL 0.76 0.83   ANION GAP mmol/L 8 5   CALCIUM mg/dL 9.3 8.8   ALBUMIN  g/dL  --  3.7   TOTAL BILIRUBIN mg/dL  --  0.57   ALK PHOS U/L  --  66   ALT U/L  --  12   AST U/L  --  18   GLUCOSE RANDOM mg/dL 196* 166*                       Imaging: I have personally reviewed pertinent reports.      CTA ED chest PE Study   Final Result by Mitchell Iniguez MD (04/08 0538)      No intraluminal filling defect to suggest an acute pulmonary embolus.      Clear lungs.                  Workstation performed: [id number]         XR chest 1 view portable   Final Result by Michael Dalton MD (04/09 0704)      No acute cardiopulmonary disease.            Workstation performed: [id number]             EKG, Pathology, and Other Studies Reviewed on Admission:   EKG: reviewed    Allscripts / Epic Records Reviewed: Yes     ** Please Note: This note has been constructed using a voice recognition system. **

## 2024-04-09 NOTE — DISCHARGE SUMMARY
Critical access hospital  Discharge- Vale Felton 1975, 48 y.o. female MRN: [id number]  Unit/Bed#: -01 Encounter: [id number]  Primary Care Provider: Roberto Carvajal MD   Date and time admitted to hospital: 4/8/2024  3:05 AM    * Acute asthma exacerbation  Assessment & Plan  48-year-old female who presented with shortness of breath and diffuse wheezing  Received emergency department with minimal improvement noted to be saturating 88% on room air since improved with 2 to 3 L nasal cannula  Significantly improved with IV steroids will be discharged on oral prednisone taper    Mixed hyperlipidemia  Assessment & Plan  Continue statin    Generalized anxiety disorder  Assessment & Plan  Xanax as needed    Essential hypertension  Assessment & Plan  BP remains controlled  Continue Coreg and losartan discharge    Discharging Physician / Practitioner: Kd Koenig MD  PCP: Roberto Carvajal MD  Admission Date:   Admission Orders (From admission, onward)       Ordered        04/08/24 0725  Place in Observation  Once                          Discharge Date: 04/09/24    Medical Problems       Resolved Problems  Date Reviewed: 9/8/2023   None         Consultations During Hospital Stay:  none    Procedures Performed:   none    Significant Findings / Test Results:   XR chest 1 view portable    Result Date: 4/9/2024  Impression: No acute cardiopulmonary disease. Workstation performed: [id number]     CTA ED chest PE Study    Result Date: 4/8/2024  Impression: No intraluminal filling defect to suggest an acute pulmonary embolus. Clear lungs. Workstation performed: [id number]      Incidental Findings:   none     Test Results Pending at Discharge (will require follow up):   none     Outpatient Tests Requested:  none    Complications:  none    Reason for Admission: Acute asthma exacerbation    Hospital Course:     Vale Felton is a 48 y.o. female patient who originally presented to the hospital on 4/8/2024 with past  "medical history significant hypertension, generalized anxiety initially presented with acute asthma exacerbation was treated with IV steroids with significant improvement ultimately transition to oral prednisone taper on discharge.  Will have follow-up with primary care doctor approximate 1 week  Please see above list of diagnoses and related plan for additional information.     Condition at Discharge: stable     Discharge Day Visit / Exam:     Subjective: Denies any chest pain, shortness of breath, cough, fevers  Vitals: Blood Pressure: 147/92 (04/09/24 0734)  Pulse: 81 (04/08/24 2119)  Temperature: 98.3 °F (36.8 °C) (04/08/24 2119)  Temp Source: Oral (04/08/24 2119)  Respirations: 19 (04/08/24 2119)  Height: 5' 4\" (162.6 cm) (04/08/24 2119)  Weight - Scale: 127 kg (281 lb 1.4 oz) (04/08/24 2119)  SpO2: 94 % (04/09/24 0721)  Exam:   Physical Exam  Constitutional:       General: She is not in acute distress.     Appearance: She is well-developed. She is not diaphoretic.   HENT:      Head: Normocephalic and atraumatic.      Nose: Nose normal.      Mouth/Throat:      Pharynx: No oropharyngeal exudate.   Eyes:      General: No scleral icterus.        Right eye: No discharge.         Left eye: No discharge.      Conjunctiva/sclera: Conjunctivae normal.   Neck:      Thyroid: No thyromegaly.      Vascular: No JVD.   Cardiovascular:      Rate and Rhythm: Normal rate and regular rhythm.      Heart sounds: Normal heart sounds. No murmur heard.     No friction rub. No gallop.   Pulmonary:      Effort: Pulmonary effort is normal. No respiratory distress.      Breath sounds: Normal breath sounds. No wheezing or rales.   Chest:      Chest wall: No tenderness.   Abdominal:      General: Bowel sounds are normal. There is no distension.      Palpations: Abdomen is soft.      Tenderness: There is no abdominal tenderness. There is no guarding or rebound.   Musculoskeletal:         General: No tenderness or deformity. Normal range of " motion.      Cervical back: Normal range of motion and neck supple.   Skin:     General: Skin is warm and dry.      Findings: No erythema or rash.   Neurological:      Mental Status: She is alert. Mental status is at baseline.      Cranial Nerves: No cranial nerve deficit.      Sensory: No sensory deficit.      Motor: No abnormal muscle tone.      Coordination: Coordination normal.         Discussion with Family: pt, declined family update    Discharge instructions/Information to patient and family:   See after visit summary for information provided to patient and family.      Provisions for Follow-Up Care:  See after visit summary for information related to follow-up care and any pertinent home health orders.      Disposition:     Home    For Discharges to [facility]F:   Not Applicable to this Patient - Not Applicable to this Patient    Planned Readmission: none     Discharge Statement:  I spent 60 minutes discharging the patient. This time was spent on the day of discharge. I had direct contact with the patient on the day of discharge. Greater than 50% of the total time was spent examining patient, answering all patient questions, arranging and discussing plan of care with patient as well as directly providing post-discharge instructions.  Additional time then spent on discharge activities.    Discharge Medications:  See after visit summary for reconciled discharge medications provided to patient and family.      ** Please Note: This note has been constructed using a voice recognition system **

## 2024-04-09 NOTE — RESPIRATORY THERAPY NOTE
RT Protocol Note  Vale Felton 48 y.o. female MRN: [id number]  Unit/Bed#: -01 Encounter: [id number]    Assessment    Principal Problem:    Acute asthma exacerbation  Active Problems:    Essential hypertension    Generalized anxiety disorder    Mixed hyperlipidemia      Home Pulmonary Medications:     04/09/24 0721   Respiratory Protocol   Protocol Initiated? No   Protocol Selection Respiratory   Language Barrier? No   Medical & Social History Reviewed? Yes   Diagnostic Studies Reviewed? Yes   Physical Assessment Performed? Yes   Respiratory Plan Mild Distress pathway   Respiratory Assessment   Assessment Type Pre-treatment   General Appearance Alert;Awake   Respiratory Pattern Normal   Chest Assessment Chest expansion symmetrical   Bilateral Breath Sounds Diminished   Resp Comments admitted for asthma exacerbation, bbs with few exp wheezes, continue as ordered   Additional Assessments   SpO2 94 %            Past Medical History:   Diagnosis Date    Arthritis     Asthma     Diabetes mellitus (HCC)     Hypertension      Social History     Socioeconomic History    Marital status: /Civil Union     Spouse name: None    Number of children: None    Years of education: None    Highest education level: None   Occupational History    None   Tobacco Use    Smoking status: Former    Smokeless tobacco: Never   Vaping Use    Vaping status: Never Used   Substance and Sexual Activity    Alcohol use: Never    Drug use: No    Sexual activity: None   Other Topics Concern    None   Social History Narrative    None     Social Determinants of Health     Financial Resource Strain: Not on file   Food Insecurity: No Food Insecurity (4/9/2024)    Hunger Vital Sign     Worried About Running Out of Food in the Last Year: Never true     Ran Out of Food in the Last Year: Never true   Transportation Needs: No Transportation Needs (4/9/2024)    PRAPARE - Transportation     Lack of Transportation (Medical): No     Lack of  "Transportation (Non-Medical): No   Physical Activity: Not on file   Stress: Not on file   Social Connections: Not on file   Intimate Partner Violence: Not on file   Housing Stability: Low Risk  (4/9/2024)    Housing Stability Vital Sign     Unable to Pay for Housing in the Last Year: No     Number of Places Lived in the Last Year: 1     Unstable Housing in the Last Year: No       Subjective         Objective    Physical Exam:   Assessment Type: Pre-treatment  General Appearance: Alert, Awake  Respiratory Pattern: Normal  Chest Assessment: Chest expansion symmetrical  Bilateral Breath Sounds: Diminished    Vitals:  Blood pressure 147/92, pulse 81, temperature 98.3 °F (36.8 °C), temperature source Oral, resp. rate 19, height 5' 4\" (1.626 m), weight 127 kg (281 lb 1.4 oz), SpO2 94%.          Imaging and other studies: I have personally reviewed pertinent reports.      O2 Device: nc     Plan    Respiratory Plan: Mild Distress pathway        Resp Comments: admitted for asthma exacerbation, bbs with few exp wheezes, continue as ordered   "

## 2024-04-09 NOTE — ASSESSMENT & PLAN NOTE
48-year-old female who presented with shortness of breath and diffuse wheezing  Received emergency department with minimal improvement noted to be saturating 88% on room air since improved with 2 to 3 L nasal cannula  Significantly improved with IV steroids will be discharged on oral prednisone taper

## 2024-04-09 NOTE — UTILIZATION REVIEW
Initial Clinical Review    Admission: Date/Time/Statement:   Admission Orders (From admission, onward)       Ordered        04/08/24 0725  Place in Observation  Once                          Orders Placed This Encounter   Procedures    Place in Observation     Standing Status:   Standing     Number of Occurrences:   1     Order Specific Question:   Level of Care     Answer:   Med Surg [16]     ED Arrival Information       Expected   -    Arrival   4/8/2024 03:04    Acuity   Urgent              Means of arrival   Ambulance    Escorted by   [facility] ([place])    Service   Hospitalist    Admission type   Emergency              Arrival complaint   Asthma             Chief Complaint   Patient presents with    Shortness of Breath     Pt having Asthma at home called EMS and brought her in currently has wheezes in all fields, patient is awake and alert.       Initial Presentation: 48 y.o. female to ED from home w/ PMHX hypertension, generalized anxiety, hyperlipidemia, asthma and is present with shortness of breath and wheezing. Reports shortness of breath and wheezing for the past couple days without significant improvement with home inhalers. O2 sat 88 % on RA and placed on 2-3 l NC . Admitted OBS status w/ acute asthma exacerbation plan for iv steroids , nebs , wean O2 and steroids as able . HLD statin . Anxiety xanax . HTN cont coreg and losartan .         ED Triage Vitals   Temperature Pulse Respirations Blood Pressure SpO2   04/08/24 0307 04/08/24 0307 04/08/24 0307 04/08/24 0307 04/08/24 0307   98.2 °F (36.8 °C) 103 (!) 23 140/75 90 %      Temp Source Heart Rate Source Patient Position - Orthostatic VS BP Location FiO2 (%)   04/08/24 2119 04/08/24 0307 04/08/24 0307 04/08/24 0307 --   Oral Monitor Sitting Right arm       Pain Score       04/08/24 0731       No Pain          Wt Readings from Last 1 Encounters:   04/08/24 127 kg (281 lb 1.4 oz)     Additional Vital Signs:                04/09/24 07:34:23 -- -- -- 147/92  110 -- -- -- -- --   04/09/24 0721 -- -- -- -- -- 94 % -- -- None (Room air) --   04/08/24 2119 98.3 °F (36.8 °C) 81 19 154/72 -- 94 % -- -- None (Room air) --   04/08/24 2007 -- -- -- -- -- 95 % 28 2 L/min Nasal cannula --   04/08/24 1451 -- 89 25 Abnormal  155/75 108 93 % -- -- -- --   04/08/24 1258 -- 86 20 181/95 Abnormal  -- 96 % -- -- -- Lying   04/08/24 1030 -- 83 22 139/65 94 93 % 32 3 L/min Nasal cannula --   04/08/24 1015 -- 88 23 Abnormal  139/65 -- 93 % 32 3 L/min Nasal cannula --   04/08/24 1000 -- 92 22 -- -- 91 % -- -- -- --   04/08/24 0945 -- 97 18 -- -- 91 % -- -- -- --   04/08/24 0930 -- 103 21 -- -- 95 % 32 3 L/min Nasal cannula --   04/08/24 0915 -- 99 19 -- -- -- -- -- -- --   04/08/24 0900 -- 95 20 -- -- 94 % -- -- -- --   04/08/24 0845 -- 97 22 166/99 127 95 % -- -- -- --   04/08/24 0830 -- 97 22 166/99 -- 96 % 32 3 L/min Nasal cannula --   04/08/24 0815 -- 93 22 -- -- 95 % 32 3 L/min Nasal cannula --   04/08/24 0800 -- 100 23 Abnormal  -- -- 94 % 32 3 L/min Nasal cannula --   04/08/24 0745 -- 99 24 Abnormal  135/85 104 93 % -- -- -- --   04/08/24 0730 -- 94 23 Abnormal  135/85 -- 91 % 32 3 L/min Nasal cannula --   04/08/24 0535 -- 95 20 -- -- 97 % 32 3 L/min Nasal cannula        Pertinent Labs/Diagnostic Test Results:   4/8 EKG NSR Nonspecific ST and T wave abnormality   CTA ED chest PE Study   Final Result by Mitchell Iniguez MD (04/08 0538)      No intraluminal filling defect to suggest an acute pulmonary embolus.      Clear lungs.                  Workstation performed: [id number]         XR chest 1 view portable   Final Result by Michael Dalton MD (04/09 0704)      No acute cardiopulmonary disease.            Workstation performed: [id number]           Results from last 7 days   Lab Units 04/08/24  0315   SARS-COV-2  Negative     Results from last 7 days   Lab Units 04/09/24  0449 04/08/24  0315   WBC Thousand/uL 15.45* 11.75*   HEMOGLOBIN g/dL 12.2 11.3*   HEMATOCRIT % 39.2 37.1    PLATELETS Thousands/uL 380 316   NEUTROS ABS Thousands/µL 13.93* 7.56         Results from last 7 days   Lab Units 04/09/24  0449 04/08/24  0315   SODIUM mmol/L 138 138   POTASSIUM mmol/L 4.3 3.4*   CHLORIDE mmol/L 103 102   CO2 mmol/L 27 31   ANION GAP mmol/L 8 5   BUN mg/dL 18 14   CREATININE mg/dL 0.76 0.83   EGFR ml/min/1.73sq m 91 82   CALCIUM mg/dL 9.3 8.8     Results from last 7 days   Lab Units 04/08/24  0315   AST U/L 18   ALT U/L 12   ALK PHOS U/L 66   TOTAL PROTEIN g/dL 7.3   ALBUMIN g/dL 3.7   TOTAL BILIRUBIN mg/dL 0.57         Results from last 7 days   Lab Units 04/09/24  0449 04/08/24  0315   GLUCOSE RANDOM mg/dL 196* 166*       Results from last 7 days   Lab Units 04/08/24  0648 04/08/24  0530 04/08/24  0315   HS TNI 0HR ng/L  --   --  3   HS TNI 2HR ng/L  --  13  --    HSTNI D2 ng/L  --  10  --    HS TNI 4HR ng/L 14  --   --    HSTNI D4 ng/L 11  --   --      Results from last 7 days   Lab Units 04/08/24  0315   D-DIMER QUANTITATIVE ug/ml FEU 0.67*         Results from last 7 days   Lab Units 04/08/24  0830   CLARITY UA  Clear   COLOR UA  Light Yellow   SPEC GRAV UA  >=1.050*   PH UA  5.5   GLUCOSE UA mg/dl Trace*   KETONES UA mg/dl Negative   BLOOD UA  Small*   PROTEIN UA mg/dl 30 (1+)*   NITRITE UA  Negative   BILIRUBIN UA  Negative   UROBILINOGEN UA (BE) mg/dl <2.0   LEUKOCYTES UA  Negative   WBC UA /hpf 1-2   RBC UA /hpf 2-4*   BACTERIA UA /hpf None Seen   EPITHELIAL CELLS WET PREP /hpf Occasional     Results from last 7 days   Lab Units 04/08/24  0315   INFLUENZA A PCR  Negative   INFLUENZA B PCR  Negative   RSV PCR  Negative         ED Treatment:   Medication Administration from 04/08/2024 0304 to 04/08/2024 2056         Date/Time Order Dose Route Action     04/08/2024 1450 EDT ipratropium-albuterol (DUO-NEB) 0.5-2.5 mg/3 mL inhalation solution 3 mL 3 mL Nebulization Given     04/08/2024 0732 EDT ipratropium-albuterol (DUO-NEB) 0.5-2.5 mg/3 mL inhalation solution 3 mL 3 mL Nebulization Given      04/08/2024 0318 EDT ipratropium-albuterol (DUO-NEB) 0.5-2.5 mg/3 mL inhalation solution 3 mL 3 mL Nebulization Given     04/08/2024 0309 EDT ipratropium-albuterol (DUO-NEB) 0.5-2.5 mg/3 mL inhalation solution 3 mL 0 mL Nebulization Given to EMS     04/08/2024 0330 EDT ipratropium-albuterol (DUO-NEB) 0.5-2.5 mg/3 mL inhalation solution 3 mL 3 mL Nebulization Given     04/08/2024 0322 EDT magnesium sulfate 2 g/50 mL IVPB (premix) 2 g 2 g Intravenous New Bag     04/08/2024 0320 EDT methylPREDNISolone sodium succinate (Solu-MEDROL) injection 125 mg 125 mg Intravenous Given     04/08/2024 0530 EDT ketorolac (TORADOL) injection 15 mg 15 mg Intravenous Given     04/08/2024 0645 EDT morphine injection 4 mg 4 mg Intravenous Given     04/08/2024 1712 EDT acetaminophen (TYLENOL) tablet 650 mg 650 mg Oral Given     04/08/2024 1134 EDT methylPREDNISolone sodium succinate (Solu-MEDROL) injection 40 mg 40 mg Intravenous Given     04/08/2024 0908 EDT carvedilol (COREG) tablet 6.25 mg 6.25 mg Oral Given     04/08/2024 0908 EDT famotidine (PEPCID) tablet 20 mg 20 mg Oral Given     04/08/2024 0908 EDT losartan (COZAAR) tablet 100 mg 100 mg Oral Given     04/08/2024 1712 EDT pravastatin (PRAVACHOL) tablet 80 mg 80 mg Oral Given     04/08/2024 1133 EDT oxyCODONE (ROXICODONE) IR tablet 5 mg 5 mg Oral Given     04/08/2024 2007 EDT levalbuterol (XOPENEX) inhalation solution 1.25 mg 1.25 mg Nebulization Given     04/08/2024 2007 EDT ipratropium (ATROVENT) 0.02 % inhalation solution 0.5 mg 0.5 mg Nebulization Given          Past Medical History:   Diagnosis Date    Arthritis     Asthma     Diabetes mellitus (HCC)     Hypertension      Present on Admission:   Essential hypertension   Generalized anxiety disorder   Mixed hyperlipidemia      Admitting Diagnosis: Shortness of breath [R06.02]  Dyspnea [R06.00]  Hypoxia [R09.02]  Asthma exacerbation [J45.901]  Age/Sex: 48 y.o. female  Admission Orders:  Scheduled Medications:  carvedilol, 6.25  mg, Oral, BID  famotidine, 20 mg, Oral, BID  ipratropium, 0.5 mg, Nebulization, TID  levalbuterol, 1.25 mg, Nebulization, TID  lidocaine, 2 patch, Topical, Daily  losartan, 100 mg, Oral, Daily  methylPREDNISolone sodium succinate, 40 mg, Intravenous, Q8H JUAN ANTONIO  pravastatin, 80 mg, Oral, Daily With Dinner  traZODone, 150 mg, Oral, HS      Continuous IV Infusions:     PRN Meds:  acetaminophen, 650 mg, Oral, Q6H PRN  albuterol, 2 puff, Inhalation, Q4H PRN  menthol-methyl salicylate, , Apply externally, 4x Daily PRN  oxyCODONE, 5 mg, Oral, Q4H PRN      I&O   Reg diet   Tele   Cont pulse ox        Network Utilization Review Department  ATTENTION: Please call with any questions or concerns to [phone] and carefully listen to the prompts so that you are directed to the right person. All voicemails are confidential.   For Discharge needs, contact Care Management DC Support Team at [phone] opt. 2  Send all requests for admission clinical reviews, approved or denied determinations and any other requests to dedicated fax number below belonging to the campus where the patient is receiving treatment. List of dedicated fax numbers for the Facilities:  FACILITY NAME UR FAX NUMBER   ADMISSION DENIALS (Administrative/Medical Necessity) [phone]   DISCHARGE SUPPORT TEAM (NETWORK) [phone]   PARENT CHILD HEALTH (Maternity/NICU/Pediatrics) [phone]   [facility] [phone]   [facility] [phone]   [facility] [phone]   [facility] [phone]   [facility] [phone]   [facility] [phone]   [facility] [phone]   [facility] [phone]   [facility] [phone]   [facility] [phone]   [facility]  [place] [phone]   [facility] [place] [phone]

## 2025-02-27 ENCOUNTER — HOSPITAL ENCOUNTER (INPATIENT)
Facility: HOSPITAL | Age: 50
LOS: 1 days | DRG: 690 | End: 2025-02-28
Attending: EMERGENCY MEDICINE | Admitting: FAMILY MEDICINE
Payer: COMMERCIAL

## 2025-02-27 ENCOUNTER — APPOINTMENT (EMERGENCY)
Dept: CT IMAGING | Facility: HOSPITAL | Age: 50
DRG: 690 | End: 2025-02-27
Payer: COMMERCIAL

## 2025-02-27 DIAGNOSIS — N20.0 KIDNEY STONE: ICD-10-CM

## 2025-02-27 DIAGNOSIS — R10.9 LEFT FLANK PAIN: ICD-10-CM

## 2025-02-27 DIAGNOSIS — R10.9 FLANK PAIN: Primary | ICD-10-CM

## 2025-02-27 LAB
ALBUMIN SERPL BCG-MCNC: 4 G/DL (ref 3.5–5)
ALP SERPL-CCNC: 78 U/L (ref 34–104)
ALT SERPL W P-5'-P-CCNC: 11 U/L (ref 7–52)
ANION GAP SERPL CALCULATED.3IONS-SCNC: 8 MMOL/L (ref 4–13)
AST SERPL W P-5'-P-CCNC: 12 U/L (ref 13–39)
BACTERIA UR QL AUTO: ABNORMAL /HPF
BASOPHILS # BLD AUTO: 0.04 THOUSANDS/ÂΜL (ref 0–0.1)
BASOPHILS NFR BLD AUTO: 0 % (ref 0–1)
BILIRUB SERPL-MCNC: 0.68 MG/DL (ref 0.2–1)
BILIRUB UR QL STRIP: NEGATIVE
BUN SERPL-MCNC: 18 MG/DL (ref 5–25)
CALCIUM SERPL-MCNC: 9.3 MG/DL (ref 8.4–10.2)
CHLORIDE SERPL-SCNC: 105 MMOL/L (ref 96–108)
CLARITY UR: CLEAR
CO2 SERPL-SCNC: 27 MMOL/L (ref 21–32)
COLOR UR: YELLOW
CREAT SERPL-MCNC: 0.89 MG/DL (ref 0.6–1.3)
EOSINOPHIL # BLD AUTO: 0.09 THOUSAND/ÂΜL (ref 0–0.61)
EOSINOPHIL NFR BLD AUTO: 1 % (ref 0–6)
ERYTHROCYTE [DISTWIDTH] IN BLOOD BY AUTOMATED COUNT: 13.7 % (ref 11.6–15.1)
EXT PREGNANCY TEST URINE: NEGATIVE
EXT. CONTROL: NORMAL
GFR SERPL CREATININE-BSD FRML MDRD: 75 ML/MIN/1.73SQ M
GLUCOSE SERPL-MCNC: 167 MG/DL (ref 65–140)
GLUCOSE UR STRIP-MCNC: NEGATIVE MG/DL
HCG SERPL QL: NEGATIVE
HCT VFR BLD AUTO: 40.8 % (ref 34.8–46.1)
HGB BLD-MCNC: 12.4 G/DL (ref 11.5–15.4)
HGB UR QL STRIP.AUTO: ABNORMAL
IMM GRANULOCYTES # BLD AUTO: 0.04 THOUSAND/UL (ref 0–0.2)
IMM GRANULOCYTES NFR BLD AUTO: 0 % (ref 0–2)
KETONES UR STRIP-MCNC: ABNORMAL MG/DL
LACTATE SERPL-SCNC: 1 MMOL/L (ref 0.5–2)
LEUKOCYTE ESTERASE UR QL STRIP: ABNORMAL
LIPASE SERPL-CCNC: 15 U/L (ref 11–82)
LYMPHOCYTES # BLD AUTO: 1.67 THOUSANDS/ÂΜL (ref 0.6–4.47)
LYMPHOCYTES NFR BLD AUTO: 14 % (ref 14–44)
MCH RBC QN AUTO: 25.6 PG (ref 26.8–34.3)
MCHC RBC AUTO-ENTMCNC: 30.4 G/DL (ref 31.4–37.4)
MCV RBC AUTO: 84 FL (ref 82–98)
MONOCYTES # BLD AUTO: 0.5 THOUSAND/ÂΜL (ref 0.17–1.22)
MONOCYTES NFR BLD AUTO: 4 % (ref 4–12)
MUCOUS THREADS UR QL AUTO: ABNORMAL
NEUTROPHILS # BLD AUTO: 9.52 THOUSANDS/ÂΜL (ref 1.85–7.62)
NEUTS SEG NFR BLD AUTO: 81 % (ref 43–75)
NITRITE UR QL STRIP: NEGATIVE
NON-SQ EPI CELLS URNS QL MICRO: ABNORMAL /HPF
NRBC BLD AUTO-RTO: 0 /100 WBCS
PH UR STRIP.AUTO: 5.5 [PH]
PLATELET # BLD AUTO: 390 THOUSANDS/UL (ref 149–390)
PMV BLD AUTO: 11 FL (ref 8.9–12.7)
POTASSIUM SERPL-SCNC: 3.9 MMOL/L (ref 3.5–5.3)
PROT SERPL-MCNC: 7.4 G/DL (ref 6.4–8.4)
PROT UR STRIP-MCNC: ABNORMAL MG/DL
RBC # BLD AUTO: 4.84 MILLION/UL (ref 3.81–5.12)
RBC #/AREA URNS AUTO: ABNORMAL /HPF
SODIUM SERPL-SCNC: 140 MMOL/L (ref 135–147)
SP GR UR STRIP.AUTO: 1.03 (ref 1–1.03)
UROBILINOGEN UR STRIP-ACNC: 2 MG/DL
WBC # BLD AUTO: 11.86 THOUSAND/UL (ref 4.31–10.16)
WBC #/AREA URNS AUTO: ABNORMAL /HPF

## 2025-02-27 PROCEDURE — 36415 COLL VENOUS BLD VENIPUNCTURE: CPT | Performed by: EMERGENCY MEDICINE

## 2025-02-27 PROCEDURE — 83690 ASSAY OF LIPASE: CPT | Performed by: EMERGENCY MEDICINE

## 2025-02-27 PROCEDURE — 83605 ASSAY OF LACTIC ACID: CPT | Performed by: EMERGENCY MEDICINE

## 2025-02-27 PROCEDURE — 87086 URINE CULTURE/COLONY COUNT: CPT | Performed by: EMERGENCY MEDICINE

## 2025-02-27 PROCEDURE — 85025 COMPLETE CBC W/AUTO DIFF WBC: CPT | Performed by: EMERGENCY MEDICINE

## 2025-02-27 PROCEDURE — 96376 TX/PRO/DX INJ SAME DRUG ADON: CPT

## 2025-02-27 PROCEDURE — 87040 BLOOD CULTURE FOR BACTERIA: CPT | Performed by: EMERGENCY MEDICINE

## 2025-02-27 PROCEDURE — 87186 SC STD MICRODIL/AGAR DIL: CPT | Performed by: EMERGENCY MEDICINE

## 2025-02-27 PROCEDURE — 81001 URINALYSIS AUTO W/SCOPE: CPT | Performed by: EMERGENCY MEDICINE

## 2025-02-27 PROCEDURE — 99285 EMERGENCY DEPT VISIT HI MDM: CPT

## 2025-02-27 PROCEDURE — 99285 EMERGENCY DEPT VISIT HI MDM: CPT | Performed by: EMERGENCY MEDICINE

## 2025-02-27 PROCEDURE — 96375 TX/PRO/DX INJ NEW DRUG ADDON: CPT

## 2025-02-27 PROCEDURE — 80053 COMPREHEN METABOLIC PANEL: CPT | Performed by: EMERGENCY MEDICINE

## 2025-02-27 PROCEDURE — 96361 HYDRATE IV INFUSION ADD-ON: CPT

## 2025-02-27 PROCEDURE — 84145 PROCALCITONIN (PCT): CPT | Performed by: EMERGENCY MEDICINE

## 2025-02-27 PROCEDURE — 74177 CT ABD & PELVIS W/CONTRAST: CPT

## 2025-02-27 PROCEDURE — 96374 THER/PROPH/DIAG INJ IV PUSH: CPT

## 2025-02-27 PROCEDURE — 81025 URINE PREGNANCY TEST: CPT | Performed by: EMERGENCY MEDICINE

## 2025-02-27 PROCEDURE — 84703 CHORIONIC GONADOTROPIN ASSAY: CPT | Performed by: EMERGENCY MEDICINE

## 2025-02-27 PROCEDURE — 87077 CULTURE AEROBIC IDENTIFY: CPT | Performed by: EMERGENCY MEDICINE

## 2025-02-27 RX ORDER — ONDANSETRON 2 MG/ML
4 INJECTION INTRAMUSCULAR; INTRAVENOUS ONCE
Status: COMPLETED | OUTPATIENT
Start: 2025-02-27 | End: 2025-02-27

## 2025-02-27 RX ORDER — MORPHINE SULFATE 10 MG/ML
6 INJECTION, SOLUTION INTRAMUSCULAR; INTRAVENOUS ONCE
Status: COMPLETED | OUTPATIENT
Start: 2025-02-27 | End: 2025-02-27

## 2025-02-27 RX ORDER — MORPHINE SULFATE 4 MG/ML
4 INJECTION, SOLUTION INTRAMUSCULAR; INTRAVENOUS ONCE
Status: COMPLETED | OUTPATIENT
Start: 2025-02-27 | End: 2025-02-27

## 2025-02-27 RX ORDER — DIPHENHYDRAMINE HYDROCHLORIDE 50 MG/ML
25 INJECTION INTRAMUSCULAR; INTRAVENOUS ONCE
Status: COMPLETED | OUTPATIENT
Start: 2025-02-27 | End: 2025-02-27

## 2025-02-27 RX ORDER — KETOROLAC TROMETHAMINE 30 MG/ML
15 INJECTION, SOLUTION INTRAMUSCULAR; INTRAVENOUS ONCE
Status: COMPLETED | OUTPATIENT
Start: 2025-02-27 | End: 2025-02-27

## 2025-02-27 RX ORDER — METOCLOPRAMIDE HYDROCHLORIDE 5 MG/ML
10 INJECTION INTRAMUSCULAR; INTRAVENOUS ONCE
Status: COMPLETED | OUTPATIENT
Start: 2025-02-27 | End: 2025-02-27

## 2025-02-27 RX ADMIN — ONDANSETRON 4 MG: 2 INJECTION INTRAMUSCULAR; INTRAVENOUS at 20:42

## 2025-02-27 RX ADMIN — MORPHINE SULFATE 6 MG: 10 INJECTION INTRAVENOUS at 23:31

## 2025-02-27 RX ADMIN — DIPHENHYDRAMINE HYDROCHLORIDE 25 MG: 50 INJECTION, SOLUTION INTRAMUSCULAR; INTRAVENOUS at 21:44

## 2025-02-27 RX ADMIN — KETOROLAC TROMETHAMINE 15 MG: 30 INJECTION, SOLUTION INTRAMUSCULAR at 20:43

## 2025-02-27 RX ADMIN — METOCLOPRAMIDE 10 MG: 5 INJECTION, SOLUTION INTRAMUSCULAR; INTRAVENOUS at 21:44

## 2025-02-27 RX ADMIN — IOHEXOL 100 ML: 350 INJECTION, SOLUTION INTRAVENOUS at 22:00

## 2025-02-27 RX ADMIN — MORPHINE SULFATE 4 MG: 4 INJECTION INTRAVENOUS at 21:23

## 2025-02-27 RX ADMIN — SODIUM CHLORIDE 1000 ML: 0.9 INJECTION, SOLUTION INTRAVENOUS at 20:43

## 2025-02-28 ENCOUNTER — APPOINTMENT (INPATIENT)
Dept: RADIOLOGY | Facility: HOSPITAL | Age: 50
DRG: 690 | End: 2025-02-28
Payer: COMMERCIAL

## 2025-02-28 ENCOUNTER — HOSPITAL ENCOUNTER (OUTPATIENT)
Facility: HOSPITAL | Age: 50
Setting detail: SURGERY ADMIT
DRG: 690 | End: 2025-02-28
Attending: UROLOGY | Admitting: UROLOGY
Payer: COMMERCIAL

## 2025-02-28 ENCOUNTER — HOSPITAL ENCOUNTER (INPATIENT)
Facility: HOSPITAL | Age: 50
LOS: 1 days | Discharge: HOME | DRG: 661 | End: 2025-02-28
Attending: INTERNAL MEDICINE | Admitting: UROLOGY
Payer: COMMERCIAL

## 2025-02-28 ENCOUNTER — ANESTHESIA EVENT (OUTPATIENT)
Dept: PERIOP | Facility: HOSPITAL | Age: 50
DRG: 661 | End: 2025-02-28
Payer: COMMERCIAL

## 2025-02-28 ENCOUNTER — ANESTHESIA (OUTPATIENT)
Dept: PERIOP | Facility: HOSPITAL | Age: 50
DRG: 661 | End: 2025-02-28
Payer: COMMERCIAL

## 2025-02-28 VITALS
OXYGEN SATURATION: 98 % | RESPIRATION RATE: 20 BRPM | HEART RATE: 52 BPM | DIASTOLIC BLOOD PRESSURE: 83 MMHG | SYSTOLIC BLOOD PRESSURE: 141 MMHG | TEMPERATURE: 98 F | HEIGHT: 64 IN | WEIGHT: 224 LBS | BODY MASS INDEX: 38.24 KG/M2

## 2025-02-28 VITALS
DIASTOLIC BLOOD PRESSURE: 98 MMHG | OXYGEN SATURATION: 98 % | TEMPERATURE: 98.1 F | HEART RATE: 58 BPM | SYSTOLIC BLOOD PRESSURE: 154 MMHG | RESPIRATION RATE: 16 BRPM

## 2025-02-28 DIAGNOSIS — R10.9 FLANK PAIN: ICD-10-CM

## 2025-02-28 DIAGNOSIS — N20.0 KIDNEY STONE: Primary | ICD-10-CM

## 2025-02-28 DIAGNOSIS — R10.9 LEFT FLANK PAIN: ICD-10-CM

## 2025-02-28 PROBLEM — E11.9 DIABETES MELLITUS (HCC): Status: ACTIVE | Noted: 2025-02-28

## 2025-02-28 PROBLEM — R11.2 NAUSEA AND VOMITING: Status: ACTIVE | Noted: 2025-02-28

## 2025-02-28 LAB
ALBUMIN SERPL BCG-MCNC: 3.2 G/DL (ref 3.5–5)
ALP SERPL-CCNC: 63 U/L (ref 34–104)
ALT SERPL W P-5'-P-CCNC: 9 U/L (ref 7–52)
ANION GAP SERPL CALCULATED.3IONS-SCNC: 5 MMOL/L (ref 4–13)
AST SERPL W P-5'-P-CCNC: 12 U/L (ref 13–39)
BASOPHILS # BLD AUTO: 0.03 THOUSANDS/ÂΜL (ref 0–0.1)
BASOPHILS NFR BLD AUTO: 0 % (ref 0–1)
BILIRUB SERPL-MCNC: 0.75 MG/DL (ref 0.2–1)
BUN SERPL-MCNC: 19 MG/DL (ref 5–25)
CALCIUM ALBUM COR SERPL-MCNC: 9.1 MG/DL (ref 8.3–10.1)
CALCIUM SERPL-MCNC: 8.5 MG/DL (ref 8.4–10.2)
CHLORIDE SERPL-SCNC: 108 MMOL/L (ref 96–108)
CO2 SERPL-SCNC: 26 MMOL/L (ref 21–32)
CREAT SERPL-MCNC: 0.9 MG/DL (ref 0.6–1.3)
EOSINOPHIL # BLD AUTO: 0.03 THOUSAND/ÂΜL (ref 0–0.61)
EOSINOPHIL NFR BLD AUTO: 0 % (ref 0–6)
ERYTHROCYTE [DISTWIDTH] IN BLOOD BY AUTOMATED COUNT: 13.9 % (ref 11.6–15.1)
EST. AVERAGE GLUCOSE BLD GHB EST-MCNC: 140 MG/DL
GFR SERPL CREATININE-BSD FRML MDRD: 74 ML/MIN/1.73SQ M
GLUCOSE SERPL-MCNC: 103 MG/DL (ref 65–140)
GLUCOSE SERPL-MCNC: 103 MG/DL (ref 65–140)
GLUCOSE SERPL-MCNC: 104 MG/DL (ref 65–140)
GLUCOSE SERPL-MCNC: 94 MG/DL (ref 65–140)
GLUCOSE SERPL-MCNC: 97 MG/DL (ref 65–140)
HBA1C MFR BLD: 6.5 %
HCT VFR BLD AUTO: 34.3 % (ref 34.8–46.1)
HGB BLD-MCNC: 10.7 G/DL (ref 11.5–15.4)
IMM GRANULOCYTES # BLD AUTO: 0.03 THOUSAND/UL (ref 0–0.2)
IMM GRANULOCYTES NFR BLD AUTO: 0 % (ref 0–2)
LYMPHOCYTES # BLD AUTO: 2.43 THOUSANDS/ÂΜL (ref 0.6–4.47)
LYMPHOCYTES NFR BLD AUTO: 24 % (ref 14–44)
MAGNESIUM SERPL-MCNC: 1.7 MG/DL (ref 1.9–2.7)
MCH RBC QN AUTO: 26 PG (ref 26.8–34.3)
MCHC RBC AUTO-ENTMCNC: 31.2 G/DL (ref 31.4–37.4)
MCV RBC AUTO: 83 FL (ref 82–98)
MONOCYTES # BLD AUTO: 0.83 THOUSAND/ÂΜL (ref 0.17–1.22)
MONOCYTES NFR BLD AUTO: 8 % (ref 4–12)
NEUTROPHILS # BLD AUTO: 6.8 THOUSANDS/ÂΜL (ref 1.85–7.62)
NEUTS SEG NFR BLD AUTO: 68 % (ref 43–75)
NRBC BLD AUTO-RTO: 0 /100 WBCS
PHOSPHATE SERPL-MCNC: 4.4 MG/DL (ref 2.7–4.5)
PLATELET # BLD AUTO: 306 THOUSANDS/UL (ref 149–390)
PMV BLD AUTO: 10.8 FL (ref 8.9–12.7)
POTASSIUM SERPL-SCNC: 4 MMOL/L (ref 3.5–5.3)
PROCALCITONIN SERPL-MCNC: <0.05 NG/ML
PROT SERPL-MCNC: 6 G/DL (ref 6.4–8.4)
RBC # BLD AUTO: 4.12 MILLION/UL (ref 3.81–5.12)
SODIUM SERPL-SCNC: 139 MMOL/L (ref 135–147)
WBC # BLD AUTO: 10.15 THOUSAND/UL (ref 4.31–10.16)

## 2025-02-28 PROCEDURE — 96375 TX/PRO/DX INJ NEW DRUG ADDON: CPT

## 2025-02-28 PROCEDURE — 82948 REAGENT STRIP/BLOOD GLUCOSE: CPT

## 2025-02-28 PROCEDURE — 80053 COMPREHEN METABOLIC PANEL: CPT

## 2025-02-28 PROCEDURE — 36415 COLL VENOUS BLD VENIPUNCTURE: CPT

## 2025-02-28 PROCEDURE — 83036 HEMOGLOBIN GLYCOSYLATED A1C: CPT

## 2025-02-28 PROCEDURE — C2625 STENT, NON-COR, TEM W/DEL SY: HCPCS | Performed by: UROLOGY

## 2025-02-28 PROCEDURE — C1758 CATHETER, URETERAL: HCPCS | Performed by: UROLOGY

## 2025-02-28 PROCEDURE — 52332 CYSTOSCOPY AND TREATMENT: CPT | Performed by: UROLOGY

## 2025-02-28 PROCEDURE — 0T778DZ DILATION OF LEFT URETER WITH INTRALUMINAL DEVICE, VIA NATURAL OR ARTIFICIAL OPENING ENDOSCOPIC: ICD-10-PCS | Performed by: UROLOGY

## 2025-02-28 PROCEDURE — 85025 COMPLETE CBC W/AUTO DIFF WBC: CPT

## 2025-02-28 PROCEDURE — 84100 ASSAY OF PHOSPHORUS: CPT

## 2025-02-28 PROCEDURE — 99223 1ST HOSP IP/OBS HIGH 75: CPT | Performed by: UROLOGY

## 2025-02-28 PROCEDURE — 99239 HOSP IP/OBS DSCHRG MGMT >30: CPT | Performed by: INTERNAL MEDICINE

## 2025-02-28 PROCEDURE — NC001 PR NO CHARGE: Performed by: INTERNAL MEDICINE

## 2025-02-28 PROCEDURE — C1769 GUIDE WIRE: HCPCS | Performed by: UROLOGY

## 2025-02-28 PROCEDURE — 52352 CYSTOURETERO W/STONE REMOVE: CPT | Performed by: UROLOGY

## 2025-02-28 PROCEDURE — 99223 1ST HOSP IP/OBS HIGH 75: CPT | Performed by: INTERNAL MEDICINE

## 2025-02-28 PROCEDURE — BT1F1ZZ FLUOROSCOPY OF LEFT KIDNEY, URETER AND BLADDER USING LOW OSMOLAR CONTRAST: ICD-10-PCS | Performed by: UROLOGY

## 2025-02-28 PROCEDURE — 87040 BLOOD CULTURE FOR BACTERIA: CPT | Performed by: EMERGENCY MEDICINE

## 2025-02-28 PROCEDURE — 74420 UROGRAPHY RTRGR +-KUB: CPT

## 2025-02-28 PROCEDURE — 83735 ASSAY OF MAGNESIUM: CPT

## 2025-02-28 PROCEDURE — 82360 CALCULUS ASSAY QUANT: CPT | Performed by: UROLOGY

## 2025-02-28 PROCEDURE — 0TC78ZZ EXTIRPATION OF MATTER FROM LEFT URETER, VIA NATURAL OR ARTIFICIAL OPENING ENDOSCOPIC: ICD-10-PCS | Performed by: UROLOGY

## 2025-02-28 PROCEDURE — NC001 PR NO CHARGE: Performed by: UROLOGY

## 2025-02-28 DEVICE — INLAY OPTIMA URETERAL STENT W/O GUIDEWIRE
Type: IMPLANTABLE DEVICE | Site: URETER | Status: FUNCTIONAL
Brand: BARD® INLAY OPTIMA® URETERAL STENT

## 2025-02-28 RX ORDER — MIDAZOLAM HYDROCHLORIDE 2 MG/2ML
INJECTION, SOLUTION INTRAMUSCULAR; INTRAVENOUS AS NEEDED
Status: DISCONTINUED | OUTPATIENT
Start: 2025-02-28 | End: 2025-02-28

## 2025-02-28 RX ORDER — SODIUM CHLORIDE, SODIUM LACTATE, POTASSIUM CHLORIDE, CALCIUM CHLORIDE 600; 310; 30; 20 MG/100ML; MG/100ML; MG/100ML; MG/100ML
75 INJECTION, SOLUTION INTRAVENOUS CONTINUOUS
Status: DISCONTINUED | OUTPATIENT
Start: 2025-02-28 | End: 2025-02-28 | Stop reason: HOSPADM

## 2025-02-28 RX ORDER — PRAVASTATIN SODIUM 80 MG/1
80 TABLET ORAL
Status: DISCONTINUED | OUTPATIENT
Start: 2025-02-28 | End: 2025-02-28 | Stop reason: HOSPADM

## 2025-02-28 RX ORDER — ACETAMINOPHEN 325 MG/1
650 TABLET ORAL EVERY 6 HOURS PRN
Status: DISCONTINUED | OUTPATIENT
Start: 2025-02-28 | End: 2025-02-28 | Stop reason: HOSPADM

## 2025-02-28 RX ORDER — ONDANSETRON 2 MG/ML
4 INJECTION INTRAMUSCULAR; INTRAVENOUS EVERY 6 HOURS PRN
Status: DISCONTINUED | OUTPATIENT
Start: 2025-02-28 | End: 2025-02-28 | Stop reason: HOSPADM

## 2025-02-28 RX ORDER — MAGNESIUM HYDROXIDE/ALUMINUM HYDROXICE/SIMETHICONE 120; 1200; 1200 MG/30ML; MG/30ML; MG/30ML
30 SUSPENSION ORAL EVERY 6 HOURS PRN
Status: CANCELLED | OUTPATIENT
Start: 2025-02-28

## 2025-02-28 RX ORDER — SODIUM CHLORIDE, SODIUM LACTATE, POTASSIUM CHLORIDE, CALCIUM CHLORIDE 600; 310; 30; 20 MG/100ML; MG/100ML; MG/100ML; MG/100ML
INJECTION, SOLUTION INTRAVENOUS CONTINUOUS PRN
Status: DISCONTINUED | OUTPATIENT
Start: 2025-02-28 | End: 2025-02-28

## 2025-02-28 RX ORDER — LOSARTAN POTASSIUM 50 MG/1
100 TABLET ORAL DAILY
Status: DISCONTINUED | OUTPATIENT
Start: 2025-02-28 | End: 2025-02-28 | Stop reason: HOSPADM

## 2025-02-28 RX ORDER — GLYCOPYRROLATE 0.2 MG/ML
INJECTION INTRAMUSCULAR; INTRAVENOUS AS NEEDED
Status: DISCONTINUED | OUTPATIENT
Start: 2025-02-28 | End: 2025-02-28

## 2025-02-28 RX ORDER — FENTANYL CITRATE/PF 50 MCG/ML
25 SYRINGE (ML) INJECTION
Status: DISCONTINUED | OUTPATIENT
Start: 2025-02-28 | End: 2025-02-28 | Stop reason: HOSPADM

## 2025-02-28 RX ORDER — TAMSULOSIN HYDROCHLORIDE 0.4 MG/1
0.4 CAPSULE ORAL
Status: CANCELLED | OUTPATIENT
Start: 2025-02-28

## 2025-02-28 RX ORDER — ONDANSETRON 2 MG/ML
INJECTION INTRAMUSCULAR; INTRAVENOUS AS NEEDED
Status: DISCONTINUED | OUTPATIENT
Start: 2025-02-28 | End: 2025-02-28

## 2025-02-28 RX ORDER — LOSARTAN POTASSIUM 50 MG/1
100 TABLET ORAL DAILY
Status: CANCELLED | OUTPATIENT
Start: 2025-03-01

## 2025-02-28 RX ORDER — PRAVASTATIN SODIUM 40 MG
80 TABLET ORAL
Status: CANCELLED | OUTPATIENT
Start: 2025-02-28

## 2025-02-28 RX ORDER — EPHEDRINE SULFATE 50 MG/ML
INJECTION INTRAVENOUS AS NEEDED
Status: DISCONTINUED | OUTPATIENT
Start: 2025-02-28 | End: 2025-02-28

## 2025-02-28 RX ORDER — CALCIUM CARBONATE 500 MG/1
1000 TABLET, CHEWABLE ORAL DAILY PRN
Status: CANCELLED | OUTPATIENT
Start: 2025-02-28

## 2025-02-28 RX ORDER — MAGNESIUM HYDROXIDE/ALUMINUM HYDROXICE/SIMETHICONE 120; 1200; 1200 MG/30ML; MG/30ML; MG/30ML
30 SUSPENSION ORAL EVERY 6 HOURS PRN
Status: DISCONTINUED | OUTPATIENT
Start: 2025-02-28 | End: 2025-02-28 | Stop reason: HOSPADM

## 2025-02-28 RX ORDER — HYDROMORPHONE HCL IN WATER/PF 6 MG/30 ML
0.2 PATIENT CONTROLLED ANALGESIA SYRINGE INTRAVENOUS EVERY 4 HOURS PRN
Refills: 0 | Status: CANCELLED | OUTPATIENT
Start: 2025-02-28

## 2025-02-28 RX ORDER — PRAVASTATIN SODIUM 40 MG
80 TABLET ORAL
Status: DISCONTINUED | OUTPATIENT
Start: 2025-02-28 | End: 2025-02-28 | Stop reason: HOSPADM

## 2025-02-28 RX ORDER — ONDANSETRON 2 MG/ML
4 INJECTION INTRAMUSCULAR; INTRAVENOUS EVERY 6 HOURS PRN
Status: CANCELLED | OUTPATIENT
Start: 2025-02-28

## 2025-02-28 RX ORDER — CALCIUM CARBONATE 500 MG/1
1000 TABLET, CHEWABLE ORAL DAILY PRN
Status: DISCONTINUED | OUTPATIENT
Start: 2025-02-28 | End: 2025-02-28 | Stop reason: HOSPADM

## 2025-02-28 RX ORDER — LOSARTAN POTASSIUM 50 MG/1
100 TABLET ORAL DAILY
Status: DISCONTINUED | OUTPATIENT
Start: 2025-03-01 | End: 2025-02-28 | Stop reason: HOSPADM

## 2025-02-28 RX ORDER — PROPOFOL 10 MG/ML
INJECTION, EMULSION INTRAVENOUS AS NEEDED
Status: DISCONTINUED | OUTPATIENT
Start: 2025-02-28 | End: 2025-02-28

## 2025-02-28 RX ORDER — HYDROCODONE BITARTRATE AND ACETAMINOPHEN 5; 325 MG/1; MG/1
1 TABLET ORAL EVERY 6 HOURS PRN
Refills: 0 | Status: DISCONTINUED | OUTPATIENT
Start: 2025-02-28 | End: 2025-02-28 | Stop reason: HOSPADM

## 2025-02-28 RX ORDER — FAMOTIDINE 20 MG/1
20 TABLET, FILM COATED ORAL 2 TIMES DAILY
Status: DISCONTINUED | OUTPATIENT
Start: 2025-02-28 | End: 2025-02-28

## 2025-02-28 RX ORDER — ALBUTEROL SULFATE 90 UG/1
2 INHALANT RESPIRATORY (INHALATION) EVERY 6 HOURS PRN
Status: DISCONTINUED | OUTPATIENT
Start: 2025-02-28 | End: 2025-02-28 | Stop reason: HOSPADM

## 2025-02-28 RX ORDER — OXYCODONE HYDROCHLORIDE 5 MG/1
5 TABLET ORAL EVERY 4 HOURS PRN
Refills: 0 | Status: DISCONTINUED | OUTPATIENT
Start: 2025-02-28 | End: 2025-02-28 | Stop reason: HOSPADM

## 2025-02-28 RX ORDER — DEXAMETHASONE SODIUM PHOSPHATE 10 MG/ML
INJECTION, SOLUTION INTRAMUSCULAR; INTRAVENOUS AS NEEDED
Status: DISCONTINUED | OUTPATIENT
Start: 2025-02-28 | End: 2025-02-28

## 2025-02-28 RX ORDER — ACETAMINOPHEN 325 MG/1
650 TABLET ORAL EVERY 6 HOURS PRN
Status: CANCELLED | OUTPATIENT
Start: 2025-02-28

## 2025-02-28 RX ORDER — CARVEDILOL 6.25 MG/1
6.25 TABLET ORAL 2 TIMES DAILY
Status: DISCONTINUED | OUTPATIENT
Start: 2025-02-28 | End: 2025-02-28 | Stop reason: HOSPADM

## 2025-02-28 RX ORDER — OXYCODONE HYDROCHLORIDE 5 MG/1
2.5 TABLET ORAL EVERY 4 HOURS PRN
Refills: 0 | Status: DISCONTINUED | OUTPATIENT
Start: 2025-02-28 | End: 2025-02-28 | Stop reason: HOSPADM

## 2025-02-28 RX ORDER — PANTOPRAZOLE SODIUM 40 MG/10ML
40 INJECTION, POWDER, LYOPHILIZED, FOR SOLUTION INTRAVENOUS
Status: CANCELLED | OUTPATIENT
Start: 2025-03-01

## 2025-02-28 RX ORDER — INSULIN LISPRO 100 [IU]/ML
1-6 INJECTION, SOLUTION INTRAVENOUS; SUBCUTANEOUS EVERY 6 HOURS SCHEDULED
Status: CANCELLED | OUTPATIENT
Start: 2025-02-28

## 2025-02-28 RX ORDER — HEPARIN SODIUM 5000 [USP'U]/ML
5000 INJECTION, SOLUTION INTRAVENOUS; SUBCUTANEOUS EVERY 8 HOURS SCHEDULED
Status: DISCONTINUED | OUTPATIENT
Start: 2025-02-28 | End: 2025-02-28 | Stop reason: HOSPADM

## 2025-02-28 RX ORDER — OXYCODONE HYDROCHLORIDE 5 MG/1
5 TABLET ORAL EVERY 4 HOURS PRN
Refills: 0 | Status: CANCELLED | OUTPATIENT
Start: 2025-02-28

## 2025-02-28 RX ORDER — SODIUM CHLORIDE, SODIUM LACTATE, POTASSIUM CHLORIDE, CALCIUM CHLORIDE 600; 310; 30; 20 MG/100ML; MG/100ML; MG/100ML; MG/100ML
75 INJECTION, SOLUTION INTRAVENOUS CONTINUOUS
Status: CANCELLED | OUTPATIENT
Start: 2025-02-28

## 2025-02-28 RX ORDER — CARVEDILOL 3.12 MG/1
6.25 TABLET ORAL 2 TIMES DAILY
Status: DISCONTINUED | OUTPATIENT
Start: 2025-02-28 | End: 2025-02-28 | Stop reason: HOSPADM

## 2025-02-28 RX ORDER — PANTOPRAZOLE SODIUM 40 MG/10ML
40 INJECTION, POWDER, LYOPHILIZED, FOR SOLUTION INTRAVENOUS
Status: DISCONTINUED | OUTPATIENT
Start: 2025-02-28 | End: 2025-02-28 | Stop reason: HOSPADM

## 2025-02-28 RX ORDER — INSULIN LISPRO 100 [IU]/ML
1-6 INJECTION, SOLUTION INTRAVENOUS; SUBCUTANEOUS EVERY 6 HOURS SCHEDULED
Status: DISCONTINUED | OUTPATIENT
Start: 2025-02-28 | End: 2025-02-28 | Stop reason: HOSPADM

## 2025-02-28 RX ORDER — TAMSULOSIN HYDROCHLORIDE 0.4 MG/1
0.4 CAPSULE ORAL
Status: DISCONTINUED | OUTPATIENT
Start: 2025-02-28 | End: 2025-02-28 | Stop reason: HOSPADM

## 2025-02-28 RX ORDER — HYDROCODONE BITARTRATE AND ACETAMINOPHEN 5; 325 MG/1; MG/1
1 TABLET ORAL EVERY 4 HOURS PRN
Qty: 5 TABLET | Refills: 0 | Status: SHIPPED | OUTPATIENT
Start: 2025-02-28 | End: 2025-03-02

## 2025-02-28 RX ORDER — HYDROMORPHONE HCL/PF 1 MG/ML
0.5 SYRINGE (ML) INJECTION
Status: DISCONTINUED | OUTPATIENT
Start: 2025-02-28 | End: 2025-02-28 | Stop reason: HOSPADM

## 2025-02-28 RX ORDER — FENTANYL CITRATE 50 UG/ML
INJECTION, SOLUTION INTRAMUSCULAR; INTRAVENOUS AS NEEDED
Status: DISCONTINUED | OUTPATIENT
Start: 2025-02-28 | End: 2025-02-28

## 2025-02-28 RX ORDER — PANTOPRAZOLE SODIUM 40 MG/10ML
40 INJECTION, POWDER, LYOPHILIZED, FOR SOLUTION INTRAVENOUS
Status: DISCONTINUED | OUTPATIENT
Start: 2025-03-01 | End: 2025-02-28 | Stop reason: HOSPADM

## 2025-02-28 RX ORDER — CIPROFLOXACIN 500 MG/1
500 TABLET, FILM COATED ORAL 2 TIMES DAILY
Qty: 4 TABLET | Refills: 0 | Status: SHIPPED | OUTPATIENT
Start: 2025-02-28 | End: 2025-03-02

## 2025-02-28 RX ORDER — ALBUTEROL SULFATE 90 UG/1
2 INHALANT RESPIRATORY (INHALATION) EVERY 6 HOURS PRN
Status: CANCELLED | OUTPATIENT
Start: 2025-02-28

## 2025-02-28 RX ORDER — HYDROMORPHONE HCL/PF 1 MG/ML
0.2 SYRINGE (ML) INJECTION EVERY 4 HOURS PRN
Refills: 0 | Status: DISCONTINUED | OUTPATIENT
Start: 2025-02-28 | End: 2025-02-28 | Stop reason: HOSPADM

## 2025-02-28 RX ORDER — CARVEDILOL 3.12 MG/1
6.25 TABLET ORAL 2 TIMES DAILY
Status: CANCELLED | OUTPATIENT
Start: 2025-02-28

## 2025-02-28 RX ORDER — LIDOCAINE HYDROCHLORIDE 10 MG/ML
INJECTION, SOLUTION EPIDURAL; INFILTRATION; INTRACAUDAL; PERINEURAL AS NEEDED
Status: DISCONTINUED | OUTPATIENT
Start: 2025-02-28 | End: 2025-02-28

## 2025-02-28 RX ORDER — HYDROMORPHONE HCL IN WATER/PF 6 MG/30 ML
0.2 PATIENT CONTROLLED ANALGESIA SYRINGE INTRAVENOUS EVERY 4 HOURS PRN
Status: DISCONTINUED | OUTPATIENT
Start: 2025-02-28 | End: 2025-02-28 | Stop reason: HOSPADM

## 2025-02-28 RX ORDER — HEPARIN SODIUM 5000 [USP'U]/ML
5000 INJECTION, SOLUTION INTRAVENOUS; SUBCUTANEOUS EVERY 8 HOURS SCHEDULED
Status: CANCELLED | OUTPATIENT
Start: 2025-02-28

## 2025-02-28 RX ADMIN — ONDANSETRON 4 MG: 2 INJECTION INTRAMUSCULAR; INTRAVENOUS at 14:46

## 2025-02-28 RX ADMIN — Medication 2.5 MG: at 06:41

## 2025-02-28 RX ADMIN — PROPOFOL 180 MG: 10 INJECTION, EMULSION INTRAVENOUS at 14:52

## 2025-02-28 RX ADMIN — HYDROCODONE BITARTRATE AND ACETAMINOPHEN 1 TABLET: 5; 325 TABLET ORAL at 16:08

## 2025-02-28 RX ADMIN — ONDANSETRON 4 MG: 2 INJECTION INTRAMUSCULAR; INTRAVENOUS at 10:11

## 2025-02-28 RX ADMIN — CEFEPIME 2000 MG: 2 INJECTION, POWDER, FOR SOLUTION INTRAVENOUS at 10:13

## 2025-02-28 RX ADMIN — PANTOPRAZOLE SODIUM 40 MG: 40 INJECTION, POWDER, FOR SOLUTION INTRAVENOUS at 06:41

## 2025-02-28 RX ADMIN — CARVEDILOL 6.25 MG: 3.12 TABLET, FILM COATED ORAL at 10:06

## 2025-02-28 RX ADMIN — MIDAZOLAM 2 MG: 1 INJECTION INTRAMUSCULAR; INTRAVENOUS at 14:46

## 2025-02-28 RX ADMIN — SODIUM CHLORIDE, SODIUM LACTATE, POTASSIUM CHLORIDE, AND CALCIUM CHLORIDE: .6; .31; .03; .02 INJECTION, SOLUTION INTRAVENOUS at 14:44

## 2025-02-28 RX ADMIN — HEPARIN SODIUM 5000 UNITS: 5000 INJECTION INTRAVENOUS; SUBCUTANEOUS at 06:41

## 2025-02-28 RX ADMIN — FENTANYL CITRATE 25 MCG: 50 INJECTION INTRAMUSCULAR; INTRAVENOUS at 15:03

## 2025-02-28 RX ADMIN — SODIUM CHLORIDE, SODIUM LACTATE, POTASSIUM CHLORIDE, AND CALCIUM CHLORIDE 75 ML/HR: .6; .31; .03; .02 INJECTION, SOLUTION INTRAVENOUS at 03:44

## 2025-02-28 RX ADMIN — FENTANYL CITRATE 50 MCG: 50 INJECTION INTRAMUSCULAR; INTRAVENOUS at 15:00

## 2025-02-28 RX ADMIN — LIDOCAINE HYDROCHLORIDE 100 MG: 10 INJECTION, SOLUTION EPIDURAL; INFILTRATION; INTRACAUDAL; PERINEURAL at 14:52

## 2025-02-28 RX ADMIN — FENTANYL CITRATE 25 MCG: 50 INJECTION INTRAMUSCULAR; INTRAVENOUS at 15:05

## 2025-02-28 RX ADMIN — CEFEPIME 2000 MG: 2 INJECTION, POWDER, FOR SOLUTION INTRAVENOUS at 00:05

## 2025-02-28 RX ADMIN — EPHEDRINE SULFATE 10 MG: 50 INJECTION, SOLUTION INTRAVENOUS at 15:14

## 2025-02-28 RX ADMIN — DEXAMETHASONE SODIUM PHOSPHATE 10 MG: 10 INJECTION, SOLUTION INTRAMUSCULAR; INTRAVENOUS at 14:52

## 2025-02-28 RX ADMIN — GLYCOPYRROLATE 0.2 MG: 0.2 INJECTION, SOLUTION INTRAMUSCULAR; INTRAVENOUS at 15:03

## 2025-02-28 RX ADMIN — HYDROMORPHONE HYDROCHLORIDE 0.2 MG: 0.2 INJECTION, SOLUTION INTRAMUSCULAR; INTRAVENOUS; SUBCUTANEOUS at 13:21

## 2025-02-28 RX ADMIN — OXYCODONE HYDROCHLORIDE 5 MG: 5 TABLET ORAL at 10:11

## 2025-02-28 RX ADMIN — HYDROMORPHONE HYDROCHLORIDE 0.2 MG: 0.2 INJECTION, SOLUTION INTRAMUSCULAR; INTRAVENOUS; SUBCUTANEOUS at 03:33

## 2025-02-28 RX ADMIN — LOSARTAN POTASSIUM 100 MG: 50 TABLET, FILM COATED ORAL at 10:05

## 2025-02-28 NOTE — UTILIZATION REVIEW
Initial Clinical Review    Admission: Date/Time/Statement:   Admission Orders (From admission, onward)       Ordered        02/28/25 0012  INPATIENT ADMISSION  Once                          Orders Placed This Encounter   Procedures    INPATIENT ADMISSION     Standing Status:   Standing     Number of Occurrences:   1     Level of Care:   Med Surg [16]     Estimated length of stay:   More than 2 Midnights     Certification:   I certify that inpatient services are medically necessary for this patient for a duration of greater than two midnights. See H&P and MD Progress Notes for additional information about the patient's course of treatment.     ED Arrival Information       Expected   -    Arrival   2/27/2025 19:35    Acuity   Urgent              Means of arrival   Walk-In    Escorted by   Family Member    Service   Hospitalist    Admission type   Emergency              Arrival complaint   LEFT SIDE PAIN             Chief Complaint   Patient presents with    Flank Pain     L sided flank pain x 3 days, states concern for uti.        Initial Presentation: 49 y.o. female to ED from home w/  PMH of diabetes mellitus, asthma, hypertension and arthritis who presents with left flank and left upper and lower quadrant pain.  Patient had a gastric bypass done about 8 months ago. Has been experiencing nausea and vomiting due to the severity of pain . Urology rec for flomax , NPO , IVF and pain mngt . Given cefepime, Benadryl, Toradol, Reglan, morphine, Zofran, and normal saline bolus inED . CT abdomen pelvis-There is mild left hydroureteronephrosis with subtle left perinephric and periureteral hazy inflammatory stranding noted secondary to obstructing 5 mm distal left ureteral stone approximately 2 cm above the left UVJ region. Admitted IP status w/ L flank pain , N/V plan for pain mngt , IVF , flomax , NPO , start cefepime , urology consult . HTN cont coreg and losartan . DM SSI and monitor .     Anticipated Length of  Stay/Certification Statement:  Patient will be admitted on an inpatient basis with an anticipated length of stay of greater than 2 midnights secondary to kidney stone.     2/28  Pt was transferred to [facility] [place] [facility] higher level of care     ED Treatment-Medication Administration from 02/27/2025 1935 to 02/28/2025 1537         Date/Time Order Dose Route Action     02/27/2025 2043 sodium chloride 0.9 % bolus 1,000 mL 1,000 mL Intravenous New Bag     02/27/2025 2043 ketorolac (TORADOL) injection 15 mg 15 mg Intravenous Given     02/27/2025 2042 ondansetron (ZOFRAN) injection 4 mg 4 mg Intravenous Given     02/27/2025 2123 morphine injection 4 mg 4 mg Intravenous Given     02/27/2025 2144 metoclopramide (REGLAN) injection 10 mg 10 mg Intravenous Given     02/27/2025 2144 diphenhydrAMINE (BENADRYL) injection 25 mg 25 mg Intravenous Given     02/27/2025 2200 iohexol (OMNIPAQUE) 350 MG/ML injection (MULTI-DOSE) 100 mL 100 mL Intravenous Given     02/27/2025 2331 morphine injection 6 mg 6 mg Intravenous Given     02/28/2025 0005 cefepime (MAXIPIME) 2 g/50 mL dextrose IVPB 2,000 mg Intravenous New Bag     02/28/2025 1005 losartan (COZAAR) tablet 100 mg 100 mg Oral Given     02/28/2025 1006 carvedilol (COREG) tablet 6.25 mg 6.25 mg Oral Given     02/28/2025 0344 lactated ringers infusion 75 mL/hr Intravenous New Bag     02/28/2025 0641 heparin (porcine) subcutaneous injection 5,000 Units 5,000 Units Subcutaneous Given     02/28/2025 1013 cefepime (MAXIPIME) 2 g/50 mL dextrose IVPB 2,000 mg Intravenous New Bag     02/28/2025 1011 ondansetron (ZOFRAN) injection 4 mg 4 mg Intravenous Given     02/28/2025 0641 oxyCODONE (ROXICODONE) split tablet 2.5 mg 2.5 mg Oral Given     02/28/2025 1011 oxyCODONE (ROXICODONE) split tablet 2.5 mg -- Oral See Alternative     02/28/2025 0641 oxyCODONE (ROXICODONE) IR tablet 5 mg -- Oral See Alternative     02/28/2025 1011 oxyCODONE (ROXICODONE) IR tablet 5 mg 5 mg Oral Given     02/28/2025 9999  HYDROmorphone HCl (DILAUDID) injection 0.2 mg 0.2 mg Intravenous Given     02/28/2025 1321 HYDROmorphone HCl (DILAUDID) injection 0.2 mg 0.2 mg Intravenous Given     02/28/2025 0641 pantoprazole (PROTONIX) injection 40 mg 40 mg Intravenous Given            Scheduled Medications:    ED Triage Vitals   Temperature Pulse Respirations Blood Pressure SpO2 Pain Score   02/27/25 1937 02/27/25 1937 02/27/25 1937 02/27/25 1937 02/27/25 1937 02/27/25 2118   98 °F (36.7 °C) 64 18 (!) 217/90 97 % 10 - Worst Possible Pain     Weight (last 2 days) before discharge       Date/Time Weight    02/27/25 1937 102 (224)            Vital Signs (last 3 days) before discharge       Date/Time Temp Pulse Resp BP MAP (mmHg) SpO2 O2 Device Patient Position - Orthostatic VS Lucas Coma Scale Score Pain    02/28/25 1321 -- -- -- -- -- -- -- -- -- 8    02/28/25 1000 -- 52 20 141/83 -- 98 % None (Room air) Sitting -- 7    02/28/25 0645 -- 62 20 155/81 112 97 % None (Room air) Lying -- --    02/28/25 0641 -- -- -- -- -- -- -- -- -- 7    02/28/25 0333 -- -- -- -- -- -- -- -- -- 8    02/28/25 0114 -- 63 -- 144/67 97 93 % -- -- -- --    02/28/25 0100 -- -- -- -- -- -- -- -- 15 --    02/27/25 2331 -- 70 -- 143/74 102 91 % -- -- -- 8    02/27/25 2245 -- 65 18 180/84 -- 95 % None (Room air) Lying -- 6    02/27/25 2118 -- 60 20 196/95 -- 99 % None (Room air) Sitting -- 10 - Worst Possible Pain    02/27/25 2034 -- -- -- -- -- -- None (Room air) -- -- --    02/27/25 1937 98 °F (36.7 °C) 64 18 217/90 -- 97 % None (Room air) Sitting -- --              Pertinent Labs/Diagnostic Test Results:   Radiology:  CT abdomen pelvis with contrast   Final Interpretation by Mirza Lim MD (02/27 2252)      1.  There is mild left hydroureteronephrosis with subtle left perinephric and periureteral hazy inflammatory stranding noted secondary to obstructing 5 mm distal left ureteral stone approximately 2 cm above the left UVJ region.   2.  Sequelae of gastric bypass is  seen, new since the prior study. Mild wall thickening in the region of the gastrojejunostomy anastomosis and proximal jejunal loop noted which could suggest mild focal inflammation in this region. No evidence of bowel    obstruction, mesenteric inflammation, appendicitis, free air, or free fluid. Sigmoid diverticulosis without evidence for diverticulitis.   3.  Cholelithiasis without evidence for acute cholecystitis or significant biliary ductal dilatation.         Workstation performed: [id number]         FL retrograde pyelogram    (Results Pending)     Cardiology:  No orders to display     GI:  No orders to display           Results from last 7 days   Lab Units 02/28/25 0413 02/27/25  2042   WBC Thousand/uL 10.15 11.86*   HEMOGLOBIN g/dL 10.7* 12.4   HEMATOCRIT % 34.3* 40.8   PLATELETS Thousands/uL 306 390   TOTAL NEUT ABS Thousands/µL 6.80 9.52*         Results from last 7 days   Lab Units 02/28/25 0413 02/27/25  2042   SODIUM mmol/L 139 140   POTASSIUM mmol/L 4.0 3.9   CHLORIDE mmol/L 108 105   CO2 mmol/L 26 27   ANION GAP mmol/L 5 8   BUN mg/dL 19 18   CREATININE mg/dL 0.90 0.89   EGFR ml/min/1.73sq m 74 75   CALCIUM mg/dL 8.5 9.3   MAGNESIUM mg/dL 1.7*  --    PHOSPHORUS mg/dL 4.4  --      Results from last 7 days   Lab Units 02/28/25 0413 02/27/25  2042   AST U/L 12* 12*   ALT U/L 9 11   ALK PHOS U/L 63 78   TOTAL PROTEIN g/dL 6.0* 7.4   ALBUMIN g/dL 3.2* 4.0   TOTAL BILIRUBIN mg/dL 0.75 0.68     Results from last 7 days   Lab Units 02/28/25  1533 02/28/25  1117 02/28/25  0640 02/28/25  0341   POC GLUCOSE mg/dl 104 103 94 97     Results from last 7 days   Lab Units 02/28/25  0413 02/27/25  2042   GLUCOSE RANDOM mg/dL 103 167*       Results from last 7 days   Lab Units 02/28/25  0346   HEMOGLOBIN A1C % 6.5*   EAG mg/dl 140         Results from last 7 days   Lab Units 02/27/25  2356   PROCALCITONIN ng/ml <0.05     Results from last 7 days   Lab Units 02/27/25 2042   LACTIC ACID mmol/L 1.0     Results from  last 7 days   Lab Units 02/27/25  2042   LIPASE u/L 15       Results from last 7 days   Lab Units 02/27/25  2042   CLARITY UA  Clear   COLOR UA  Yellow   SPEC GRAV UA  1.031*   PH UA  5.5   GLUCOSE UA mg/dl Negative   KETONES UA mg/dl Trace*   BLOOD UA  Moderate*   PROTEIN UA mg/dl Trace*   NITRITE UA  Negative   BILIRUBIN UA  Negative   UROBILINOGEN UA (BE) mg/dl 2.0*   LEUKOCYTES UA  Moderate*   WBC UA /hpf 30-50*   RBC UA /hpf 30-50*   BACTERIA UA /hpf None Seen   EPITHELIAL CELLS WET PREP /hpf Occasional   MUCUS THREADS  Moderate*       Results from last 7 days   Lab Units 02/28/25  0004 02/27/25  2356   BLOOD CULTURE  Received in Microbiology Lab. Culture in Progress. Received in Microbiology Lab. Culture in Progress.       Past Medical History:   Diagnosis Date    Arthritis     Asthma     Diabetes mellitus (HCC)     Hypertension      Present on Admission:   Mixed hyperlipidemia   Essential hypertension   (Resolved) Hypothyroidism   Kidney stone on left side   Nausea and vomiting      Admitting Diagnosis: Side pain [R10.9]  Age/Sex: 49 y.o. female    Network Utilization Review Department  ATTENTION: Please call with any questions or concerns to [phone] and carefully listen to the prompts so that you are directed to the right person. All voicemails are confidential.   For Discharge needs, contact Care Management DC Support Team at [phone] opt. 2  Send all requests for admission clinical reviews, approved or denied determinations and any other requests to dedicated fax number below belonging to the campus where the patient is receiving treatment. List of dedicated fax numbers for the Facilities:  FACILITY NAME UR FAX NUMBER   ADMISSION DENIALS (Administrative/Medical Necessity) [phone]   DISCHARGE SUPPORT TEAM (NETWORK) [phone]   PARENT CHILD HEALTH (Maternity/NICU/Pediatrics) [phone]   [facility] [phone]   [facility]  [phone]   [facility] [phone]   [facility] [phone]   [facility] [phone]   [facility] [phone]   [facility] [phone]   [facility] [phone]   [facility] [phone]   [facility] [phone]   [facility] [phone]   [facility] [phone]

## 2025-02-28 NOTE — ASSESSMENT & PLAN NOTE
Initial blood pressure upon presentation to /90  Has trended downwards, most recently 141/83  On Coreg and Losartan at home    Plan:  Continue home regiment of Coreg and Losartan

## 2025-02-28 NOTE — ED PROVIDER NOTES
Time reflects when diagnosis was documented in both MDM as applicable and the Disposition within this note       Time User Action Codes Description Comment    2/28/2025 12:12 AM SoodJuan Add [R10.9] Flank pain     2/28/2025 12:12 AM SoodJuan Add [N20.0] Kidney stone     2/28/2025  2:14 AM Lucy Jara Add [R10.9] Left flank pain           ED Disposition       ED Disposition   Admit    Condition   Stable    Date/Time   Fri Feb 28, 2025 12:12 AM    Comment   Case was discussed with CECY and the patient's admission status was agreed to be Admission Status: inpatient status to the service of Dr. Garcia .               Assessment & Plan       Medical Decision Making  49-year-old female with left flank pain-will get labs, UA, urine pregnancy, and CT scan.    Patient did not tolerate p.o. contrast.  She immediately began throwing up.  Will give additional antiemetics and get CT scan with IV contrast.    Amount and/or Complexity of Data Reviewed  Labs: ordered.  Radiology: ordered.    Risk  Prescription drug management.  Decision regarding hospitalization.        ED Course as of 03/01/25 0918   Thu Feb 27, 2025   [id number] Spoke with urology- recommends admission for pain control, fluids, flomax, and NPO at midnight for re-eval in the morning        Medications   sodium chloride 0.9 % bolus 1,000 mL (0 mL Intravenous Stopped 2/27/25 2245)   ketorolac (TORADOL) injection 15 mg (15 mg Intravenous Given 2/27/25 2043)   ondansetron (ZOFRAN) injection 4 mg (4 mg Intravenous Given 2/27/25 2042)   morphine injection 4 mg (4 mg Intravenous Given 2/27/25 2123)   metoclopramide (REGLAN) injection 10 mg (10 mg Intravenous Given 2/27/25 2144)   diphenhydrAMINE (BENADRYL) injection 25 mg (25 mg Intravenous Given 2/27/25 2144)   iohexol (OMNIPAQUE) 350 MG/ML injection (MULTI-DOSE) 100 mL (100 mL Intravenous Given 2/27/25 2200)   morphine injection 6 mg (6 mg Intravenous Given 2/27/25 2331)   cefepime (MAXIPIME) 2 g/50 mL dextrose  IVPB (0 mg Intravenous Stopped 25 0035)       ED Risk Strat Scores                            SBIRT 20yo+      Flowsheet Row Most Recent Value   Initial Alcohol Screen: US AUDIT-C     1. How often do you have a drink containing alcohol? 0 Filed at: 2025   2. How many drinks containing alcohol do you have on a typical day you are drinking?  0 Filed at: 2025   3b. FEMALE Any Age, or MALE 65+: How often do you have 4 or more drinks on one occassion? 0 Filed at: 2025   Audit-C Score 0 Filed at: 2025   ANN: How many times in the past year have you...    Used an illegal drug or used a prescription medication for non-medical reasons? Never Filed at: 2025                            History of Present Illness       Chief Complaint   Patient presents with    Flank Pain     L sided flank pain x 3 days, states concern for uti.        Past Medical History:   Diagnosis Date    Arthritis     Asthma     Diabetes mellitus (HCC)     Hypertension       Past Surgical History:   Procedure Laterality Date     SECTION      FL RETROGRADE PYELOGRAM  2025      History reviewed. No pertinent family history.   Social History     Tobacco Use    Smoking status: Former    Smokeless tobacco: Never   Vaping Use    Vaping status: Never Used   Substance Use Topics    Alcohol use: Never    Drug use: No      E-Cigarette/Vaping    E-Cigarette Use Never User       E-Cigarette/Vaping Substances      I have reviewed and agree with the history as documented.     49-year-old female, history of gastric bypass and diabetes, presents the emergency room with left flank pain since this afternoon.  Patient states that she has had constant sharp/stabbing pain that radiates to her left lower quadrant.  States that she has had nausea and vomiting.  Denies any fever/chills, chest pain, shortness of breath, urinary symptoms, or diarrhea/constipation.  Does have a history of kidney stones in the  past but reports that her last kidney stone was years ago.  She has not tried anything for the symptoms.  No other complaints.      History provided by:  Patient  Flank Pain  Pain location:  L flank  Pain quality: sharp and stabbing    Pain radiates to:  LLQ  Pain severity:  Moderate  Onset quality:  Sudden  Timing:  Constant  Progression:  Worsening  Chronicity:  New  Context: previous surgery    Relieved by:  None tried  Worsened by:  Nothing  Ineffective treatments:  None tried  Associated symptoms: nausea and vomiting    Associated symptoms: no chest pain, no chills, no cough, no diarrhea, no dysuria, no fever, no hematuria, no shortness of breath and no sore throat        Review of Systems   Constitutional:  Negative for chills and fever.   HENT:  Negative for congestion, ear pain and sore throat.    Eyes:  Negative for pain and visual disturbance.   Respiratory:  Negative for cough, shortness of breath and wheezing.    Cardiovascular:  Negative for chest pain and leg swelling.   Gastrointestinal:  Positive for nausea and vomiting. Negative for abdominal pain and diarrhea.   Genitourinary:  Positive for flank pain. Negative for dysuria, frequency, hematuria and urgency.   Musculoskeletal:  Negative for neck pain and neck stiffness.   Skin:  Negative for rash and wound.   Neurological:  Negative for weakness, numbness and headaches.   Psychiatric/Behavioral:  Negative for agitation and confusion.    All other systems reviewed and are negative.          Objective       ED Triage Vitals   Temperature Pulse Blood Pressure Respirations SpO2 Patient Position - Orthostatic VS   02/27/25 1937 02/27/25 1937 02/27/25 1937 02/27/25 1937 02/27/25 1937 02/27/25 1937   98 °F (36.7 °C) 64 (!) 217/90 18 97 % Sitting      Temp Source Heart Rate Source BP Location FiO2 (%) Pain Score    02/27/25 1937 02/27/25 1937 02/27/25 1937 -- 02/27/25 2118    Temporal Monitor Left arm  10 - Worst Possible Pain      Vitals      Date and Time  Temp Pulse SpO2 Resp BP Pain Score FACES Pain Rating User   02/28/25 1321 -- -- -- -- -- 8 -- JK   02/28/25 1000 -- 52 98 % 20 141/83 7 -- RD   02/28/25 0645 -- 62 97 % 20 155/81 -- --    02/28/25 0641 -- -- -- -- -- 7 -- CZ   02/28/25 0333 -- -- -- -- -- 8 --    02/28/25 0114 -- 63 93 % -- 144/67 -- --    02/27/25 2331 -- 70 91 % -- 143/74 8 --    02/27/25 2245 -- 65 95 % 18 180/84 6 -- [facility]   02/27/25 2118 -- 60 99 % 20 196/95 10 - Worst Possible Pain -- [facility]   02/27/25 1937 98 °F (36.7 °C) 64 97 % 18 217/90 -- -- [facility]            Physical Exam  Vitals and nursing note reviewed.   Constitutional:       Appearance: She is well-developed.   HENT:      Head: Normocephalic and atraumatic.   Eyes:      Pupils: Pupils are equal, round, and reactive to light.   Cardiovascular:      Rate and Rhythm: Normal rate and regular rhythm.   Pulmonary:      Effort: Pulmonary effort is normal.      Breath sounds: Normal breath sounds.   Abdominal:      General: Bowel sounds are normal.      Palpations: Abdomen is soft.      Tenderness: There is generalized abdominal tenderness. There is left CVA tenderness.   Musculoskeletal:         General: Normal range of motion.      Cervical back: Normal range of motion and neck supple.   Skin:     General: Skin is warm and dry.   Neurological:      General: No focal deficit present.      Mental Status: She is alert and oriented to person, place, and time.      Comments: No focal deficits         Results Reviewed       Procedure Component Value Units Date/Time    Blood culture #1 [[id number]] Collected: 02/28/25 0004    Lab Status: Preliminary result Specimen: Blood from Arm, Right Updated: 03/01/25 0802     Blood Culture No Growth at 24 hrs.    Blood culture #2 [[id number]] Collected: 02/27/25 2356    Lab Status: Preliminary result Specimen: Blood from Arm, Left Updated: 03/01/25 0802     Blood Culture No Growth at 24 hrs.    Urine culture [[id number]]  (Abnormal) Collected: 02/27/25 2042     Lab Status: Preliminary result Specimen: Urine, Clean Catch Updated: 03/01/25 0712     Urine Culture 50,000-59,000 cfu/ml Gram Negative Ramiro    Fingerstick Glucose (POCT) [[id number]]  (Normal) Collected: 02/28/25 1117    Lab Status: Final result Specimen: Blood Updated: 02/28/25 1118     POC Glucose 103 mg/dl     Hemoglobin A1c w/EAG Estimation (Prechecked if no A1C within 90 days) [[id number]]  (Abnormal) Collected: 02/28/25 0346    Lab Status: Final result Specimen: Blood from Arm, Left Updated: 02/28/25 0905     Hemoglobin A1C 6.5 %       mg/dl     Fingerstick Glucose (POCT) [[id number]]  (Normal) Collected: 02/28/25 0640    Lab Status: Final result Specimen: Blood Updated: 02/28/25 0651     POC Glucose 94 mg/dl     Comprehensive metabolic panel [[id number]]  (Abnormal) Collected: 02/28/25 0413    Lab Status: Final result Specimen: Blood from Arm, Left Updated: 02/28/25 0437     Sodium 139 mmol/L      Potassium 4.0 mmol/L      Chloride 108 mmol/L      CO2 26 mmol/L      ANION GAP 5 mmol/L      BUN 19 mg/dL      Creatinine 0.90 mg/dL      Glucose 103 mg/dL      Calcium 8.5 mg/dL      Corrected Calcium 9.1 mg/dL      AST 12 U/L      ALT 9 U/L      Alkaline Phosphatase 63 U/L      Total Protein 6.0 g/dL      Albumin 3.2 g/dL      Total Bilirubin 0.75 mg/dL      eGFR 74 ml/min/1.73sq m     Narrative:      National Kid[employer] Foundation guidelines for Chronic Kidney Disease (CKD):     Stage 1 with normal or high GFR (GFR > 90 mL/min/1.73 square meters)    Stage 2 Mild CKD (GFR = 60-89 mL/min/1.73 square meters)    Stage 3A Moderate CKD (GFR = 45-59 mL/min/1.73 square meters)    Stage 3B Moderate CKD (GFR = 30-44 mL/min/1.73 square meters)    Stage 4 Severe CKD (GFR = 15-29 mL/min/1.73 square meters)    Stage 5 End Stage CKD (GFR <15 mL/min/1.73 square meters)  Note: GFR calculation is accurate only with a steady state creatinine    Phosphorus [[id number]]  (Normal) Collected: 02/28/25 0413    Lab Status: Final  result Specimen: Blood from Arm, Left Updated: 02/28/25 0437     Phosphorus 4.4 mg/dL     Magnesium [[id number]]  (Abnormal) Collected: 02/28/25 0413    Lab Status: Final result Specimen: Blood from Arm, Left Updated: 02/28/25 0437     Magnesium 1.7 mg/dL     CBC and differential [[id number]]  (Abnormal) Collected: 02/28/25 0413    Lab Status: Final result Specimen: Blood from Arm, Left Updated: 02/28/25 0419     WBC 10.15 Thousand/uL      RBC 4.12 Million/uL      Hemoglobin 10.7 g/dL      Hematocrit 34.3 %      MCV 83 fL      MCH 26.0 pg      MCHC 31.2 g/dL      RDW 13.9 %      MPV 10.8 fL      Platelets 306 Thousands/uL      nRBC 0 /100 WBCs      Segmented % 68 %      Immature Grans % 0 %      Lymphocytes % 24 %      Monocytes % 8 %      Eosinophils Relative 0 %      Basophils Relative 0 %      Absolute Neutrophils 6.80 Thousands/µL      Absolute Immature Grans 0.03 Thousand/uL      Absolute Lymphocytes 2.43 Thousands/µL      Absolute Monocytes 0.83 Thousand/µL      Eosinophils Absolute 0.03 Thousand/µL      Basophils Absolute 0.03 Thousands/µL     Fingerstick Glucose (POCT) [[id number]]  (Normal) Collected: 02/28/25 0341    Lab Status: Final result Specimen: Blood Updated: 02/28/25 0346     POC Glucose 97 mg/dl     Procalcitonin [[id number]]  (Normal) Collected: 02/27/25 2356    Lab Status: Final result Specimen: Blood from Arm, Left Updated: 02/28/25 0036     Procalcitonin <0.05 ng/ml     hCG, qualitative pregnancy [[id number]]  (Normal) Collected: 02/27/25 2042    Lab Status: Final result Specimen: Blood from Arm, Left Updated: 02/27/25 2117     Preg, Serum Negative    Lactic acid, plasma (w/reflex if result > 2.0) [[id number]]  (Normal) Collected: 02/27/25 2042    Lab Status: Final result Specimen: Blood from Arm, Left Updated: 02/27/25 2113     LACTIC ACID 1.0 mmol/L     Narrative:      Result may be elevated if tourniquet was used during collection.    Comprehensive metabolic panel [[id number]]  (Abnormal)  Collected: 02/27/25 2042    Lab Status: Final result Specimen: Blood from Arm, Left Updated: 02/27/25 2113     Sodium 140 mmol/L      Potassium 3.9 mmol/L      Chloride 105 mmol/L      CO2 27 mmol/L      ANION GAP 8 mmol/L      BUN 18 mg/dL      Creatinine 0.89 mg/dL      Glucose 167 mg/dL      Calcium 9.3 mg/dL      AST 12 U/L      ALT 11 U/L      Alkaline Phosphatase 78 U/L      Total Protein 7.4 g/dL      Albumin 4.0 g/dL      Total Bilirubin 0.68 mg/dL      eGFR 75 ml/min/1.73sq m     Narrative:      National Kidney Disease Foundation guidelines for Chronic Kidney Disease (CKD):     Stage 1 with normal or high GFR (GFR > 90 mL/min/1.73 square meters)    Stage 2 Mild CKD (GFR = 60-89 mL/min/1.73 square meters)    Stage 3A Moderate CKD (GFR = 45-59 mL/min/1.73 square meters)    Stage 3B Moderate CKD (GFR = 30-44 mL/min/1.73 square meters)    Stage 4 Severe CKD (GFR = 15-29 mL/min/1.73 square meters)    Stage 5 End Stage CKD (GFR <15 mL/min/1.73 square meters)  Note: GFR calculation is accurate only with a steady state creatinine    Lipase [[id number]]  (Normal) Collected: 02/27/25 2042    Lab Status: Final result Specimen: Blood from Arm, Left Updated: 02/27/25 2113     Lipase 15 u/L     Urine Microscopic [[id number]]  (Abnormal) Collected: 02/27/25 2042    Lab Status: Final result Specimen: Urine, Clean Catch Updated: 02/27/25 2059     RBC, UA 30-50 /hpf      WBC, UA 30-50 /hpf      Epithelial Cells Occasional /hpf      Bacteria, UA None Seen /hpf      MUCUS THREADS Moderate    UA w Reflex to Microscopic w Reflex to Culture [[id number]]  (Abnormal) Collected: 02/27/25 2042    Lab Status: Final result Specimen: Urine, Clean Catch Updated: 02/27/25 2054     Color, UA Yellow     Clarity, UA Clear     Specific Gravity, UA 1.031     pH, UA 5.5     Leukocytes, UA Moderate     Nitrite, UA Negative     Protein, UA Trace mg/dl      Glucose, UA Negative mg/dl      Ketones, UA Trace mg/dl      Urobilinogen, UA 2.0 mg/dl       Bilirubin, UA Negative     Occult Blood, UA Moderate    CBC and differential [[id number]]  (Abnormal) Collected: 02/27/25 2042    Lab Status: Final result Specimen: Blood from Arm, Left Updated: 02/27/25 2053     WBC 11.86 Thousand/uL      RBC 4.84 Million/uL      Hemoglobin 12.4 g/dL      Hematocrit 40.8 %      MCV 84 fL      MCH 25.6 pg      MCHC 30.4 g/dL      RDW 13.7 %      MPV 11.0 fL      Platelets 390 Thousands/uL      nRBC 0 /100 WBCs      Segmented % 81 %      Immature Grans % 0 %      Lymphocytes % 14 %      Monocytes % 4 %      Eosinophils Relative 1 %      Basophils Relative 0 %      Absolute Neutrophils 9.52 Thousands/µL      Absolute Immature Grans 0.04 Thousand/uL      Absolute Lymphocytes 1.67 Thousands/µL      Absolute Monocytes 0.50 Thousand/µL      Eosinophils Absolute 0.09 Thousand/µL      Basophils Absolute 0.04 Thousands/µL     POCT pregnancy, urine [[id number]]  (Normal) Collected: 02/27/25 2050    Lab Status: Final result Specimen: Urine Updated: 02/27/25 2050     EXT Preg Test, Ur Negative     Control Valid            FL retrograde pyelogram   Final Interpretation by Jorge Luis Trevizo DO (02/28 1609)      Fluoroscopic guidance provided for left retrograde pyelogram.      Please see separate procedure report for additional details.         Workstation performed: [id number]         CT abdomen pelvis with contrast   Final Interpretation by Mirza Lim MD (02/27 2252)      1.  There is mild left hydroureteronephrosis with subtle left perinephric and periureteral hazy inflammatory stranding noted secondary to obstructing 5 mm distal left ureteral stone approximately 2 cm above the left UVJ region.   2.  Sequelae of gastric bypass is seen, new since the prior study. Mild wall thickening in the region of the gastrojejunostomy anastomosis and proximal jejunal loop noted which could suggest mild focal inflammation in this region. No evidence of bowel    obstruction, mesenteric inflammation,  appendicitis, free air, or free fluid. Sigmoid diverticulosis without evidence for diverticulitis.   3.  Cholelithiasis without evidence for acute cholecystitis or significant biliary ductal dilatation.         Workstation performed: [id number]             Procedures    ED Medication and Procedure Management   Prior to Admission Medications   Prescriptions Last Dose Informant Patient Reported? Taking?   ALPRAZolam (XANAX) 1 mg tablet   Yes No   Sig: Take 1 mg by mouth daily as needed   Mounjaro 5 MG/0.5ML   Yes No   Sig: INJECT 5MG UNDER THE SKIN EVERY 7 DAYS   TiZANidine (ZANAFLEX) 6 MG capsule   Yes No   Sig: tizanidine hcl 6 mg caps   al mag oxide-diphenhydramine-lidocaine viscous (MAGIC MOUTHWASH) 1:1:1 suspension   No No   Sig: Swish and spit 10 mL every 4 (four) hours as needed for mouth pain or discomfort   albuterol (PROVENTIL HFA,VENTOLIN HFA) 90 mcg/act inhaler   No No   Sig: Inhale 2 puffs every 6 (six) hours as needed for wheezing or shortness of breath   carvedilol (COREG) 6.25 mg tablet   Yes No   Sig: Take 6.25 mg by mouth 2 (two) times a day   famotidine (PEPCID) 20 mg tablet   Yes No   Sig: Take 20 mg by mouth 2 (two) times a day   fluticasone (FLONASE) 50 mcg/act nasal spray   No No   Si sprays into each nostril daily As needed for sinus congestion   gabapentin (NEURONTIN) 400 mg capsule   Yes No   Sig: gabapentin 400 mg caps   losartan (COZAAR) 100 MG tablet   Yes No   Sig: Take 100 mg by mouth daily   metFORMIN (GLUCOPHAGE) 500 mg tablet   Yes No   Sig: Take 500 mg by mouth 2 (two) times a day with meals   montelukast (SINGULAIR) 10 mg tablet   Yes No   Sig: Take 1 tablet by mouth daily   rosuvastatin (CRESTOR) 10 MG tablet   Yes No   Sig: Take 10 mg by mouth daily   traZODone (DESYREL) 150 mg tablet   Yes No   Sig: Take 150 mg by mouth daily at bedtime      Facility-Administered Medications: None     Discharge Medication List as of 2025  1:29 PM        CONTINUE these medications which  have NOT CHANGED    Details   al mag oxide-diphenhydramine-lidocaine viscous (MAGIC MOUTHWASH) 1:1:1 suspension Swish and spit 10 mL every 4 (four) hours as needed for mouth pain or discomfort, Starting Tue 6/22/2021, Print      albuterol (PROVENTIL HFA,VENTOLIN HFA) 90 mcg/act inhaler Inhale 2 puffs every 6 (six) hours as needed for wheezing or shortness of breath, Starting Thu 2/1/2018, Print      ALPRAZolam (XANAX) 1 mg tablet Take 1 mg by mouth daily as needed, Starting Thu 8/16/2012, Historical Med      carvedilol (COREG) 6.25 mg tablet Take 6.25 mg by mouth 2 (two) times a day, Starting Fri 6/9/2023, Historical Med      famotidine (PEPCID) 20 mg tablet Take 20 mg by mouth 2 (two) times a day, Starting Mon 7/17/2023, Historical Med      fluticasone (FLONASE) 50 mcg/act nasal spray 2 sprays into each nostril daily As needed for sinus congestion, Starting Thu 2/1/2018, Print      gabapentin (NEURONTIN) 400 mg capsule gabapentin 400 mg caps, Historical Med      losartan (COZAAR) 100 MG tablet Take 100 mg by mouth daily, Starting Wed 8/2/2023, Historical Med      metFORMIN (GLUCOPHAGE) 500 mg tablet Take 500 mg by mouth 2 (two) times a day with meals, Starting Wed 8/16/2023, Historical Med      montelukast (SINGULAIR) 10 mg tablet Take 1 tablet by mouth daily, Starting Mon 6/8/2015, Historical Med      Mounjaro 5 MG/0.5ML INJECT 5MG UNDER THE SKIN EVERY 7 DAYS, Historical Med      rosuvastatin (CRESTOR) 10 MG tablet Take 10 mg by mouth daily, Starting Fri 6/9/2023, Historical Med      TiZANidine (ZANAFLEX) 6 MG capsule tizanidine hcl 6 mg caps, Historical Med      traZODone (DESYREL) 150 mg tablet Take 150 mg by mouth daily at bedtime, Starting Wed 5/25/2016, Historical Med           No discharge procedures on file.  ED SEPSIS DOCUMENTATION   Time reflects when diagnosis was documented in both MDM as applicable and the Disposition within this note       Time User Action Codes Description Comment    2/28/2025 12:12  AM Juan Sood Add [R10.9] Flank pain     2/28/2025 12:12 AM Juan Sood Add [N20.0] Kidney stone     2/28/2025  2:14 AM Lucy Jara Add [R10.9] Left flank pain                  Herlinda Kendall,   03/01/25 0918

## 2025-02-28 NOTE — H&P
"H&P - Internal Medicine   Name: Vale Felton 49 y.o. female I MRN: [id number]  Unit/Bed#: ED 11 I Date of Admission: 2/27/2025   Date of Service: 2/28/2025 I Hospital Day: 0   { ?Quick Links I Problem List I PORCH I Billing Tip:36007}  Assessment & Plan  Kidney stone on left side  Presented to ED for x3 days of stabbing, left-sided flank pain radiating to LL quadrant with associated nausea/vomiting  Remote history of kidney stone, \"years ago\"  Labs 2/27: WBC 11.86, Cr 0.89, BUN 18, Lipase 15,            2/28: WBC 10.15, Cr .90, BUN 19  UA 2/27: moderate leukocytes, nitrite negative, trace proteins and ketones, no bacteria  CT 2/27: There is mild left hydroureteronephrosis with subtle left perinephric and periureteral hazy inflammatory stranding noted secondary to obstructing 5 mm distal left ureteral stone approximately 2 cm above the left UVJ region.     Received x2 dose of Cefepime    ED provider spoke to urology who recommended : Flomax, NPO, maintenance fluid, pain management and transfer to [facility] for OR given continued symptoms       Plan:   F/u urine culture and blood cultures  Trend procalcitonin  Urology intervention  Essential hypertension  Initial blood pressure upon presentation to /90  Has trended downwards, most recently 141/83  On Coreg and Losartan at home    Plan:  Continue home regiment of Coreg and Losartan  Mixed hyperlipidemia  Component  Ref Range & Units 11/14/24 1510   Cholesterol  <200 mg/dL 134   Triglycerides  <150 mg/dL 115   Cholesterol, HDL, Direct  23 - 92 mg/dL 69   Cholesterol, Non-HDL  <160 mg/dL 65   LDL Cholesterol  <130 mg/dL 42       Plan:  Continue home dose of Rosuvastatin 10 mg    Nausea and vomiting  Likely secondary to pain from nephrolithiasis, unable to tolerate PO contrast for CT scan  Received Zofran, Reglan, Benadryl in ED    Plan:  Electrolyte repletion: 2 g MgSO4 IVPB  PRN Zofran  Maintenance fluids  Diabetes mellitus (HCC)  Lab Results   Component Value Date "    HGBA1C 6.5 (H) 02/28/2025       Recent Labs     02/28/25  0341 02/28/25  0640   POCGLU 97 94       Blood Sugar Average: Last 72 hrs:  (P) 95.5    Diabetic Range HbA1C  On Metformin 500 mg BID and Mounjaro at home    Plan:  Hold Metformin and Mounjaro  Add SSI, q6 fingerstick glucose  Continue NPO       Code Status: Level 1 - Full Code  Admission Status: INPATIENT   Disposition: Patient requires Med Surg    Admit to team: SOD TEAM C    History of Present Illness   ***    Review of Systems  Medical History Review: I have reviewed the patient's PMH, PSH, Social History, Family History, Meds, and Allergies     Objective :{?Quick Links I ICU Summary I Vitals I I/Os I LDAs I Mobility (PT/OT) I Code Status / ACP   ?Quick Links I Active Meds I Pain Meds I Antibiotics I Anticoagulants:52987}  Temp:  [98 °F (36.7 °C)] 98 °F (36.7 °C)  HR:  [52-70] 52  BP: (141-217)/(67-95) 141/83  Resp:  [18-20] 20  SpO2:  [91 %-99 %] 98 %  O2 Device: None (Room air)    Intake & Output:  I/O         02/26 0701  02/27 0700 02/27 0701  02/28 0700 02/28 0701  03/01 0700    IV Piggyback  1050     Total Intake(mL/kg)  1050 (10.3)     Urine (mL/kg/hr)  325     Total Output  325     Net  +725                  Weights:   IBW (Ideal Body Weight): 54.7 kg    Body mass index is 38.45 kg/m².  Weight (last 2 days)       Date/Time Weight    02/27/25 1937 102 (224)          Physical Exam  ***  {?Quick Links I Lab Review I Micro Results I Radiology I Cardiology:54088}  Lab Results: I have reviewed the following results:  Recent Labs     02/27/25 2042 02/28/25  0413   WBC 11.86* 10.15   HGB 12.4 10.7*   HCT 40.8 34.3*    306   SODIUM 140 139   K 3.9 4.0    108   CO2 27 26   BUN 18 19   CREATININE 0.89 0.90   GLUC 167* 103   MG  --  1.7*   PHOS  --  4.4   AST 12* 12*   ALT 11 9   ALB 4.0 3.2*   TBILI 0.68 0.75   ALKPHOS 78 63   LACTICACID 1.0  --      Micro:  Lab Results   Component Value Date    BLOODCX Received in Microbiology Lab. Culture  "in Progress. 02/28/2025    BLOODCX Received in Microbiology Lab. Culture in Progress. 02/27/2025       {Imaging Results Review:55789::\"No pertinent imaging studies reviewed.\"}  {Other Study Results Review:17945::\"No additional pertinent studies reviewed.\"}    Currently Ordered Meds:   Current Facility-Administered Medications:     acetaminophen (TYLENOL) tablet 650 mg, Q6H PRN    albuterol (PROVENTIL HFA,VENTOLIN HFA) inhaler 2 puff, Q6H PRN    aluminum-magnesium hydroxide-simethicone (MAALOX) oral suspension 30 mL, Q6H PRN    calcium carbonate (TUMS) chewable tablet 1,000 mg, Daily PRN    carvedilol (COREG) tablet 6.25 mg, BID    cefepime (MAXIPIME) 2 g/50 mL dextrose IVPB, Q12H, Last Rate: 2,000 mg (02/28/25 1013)    heparin (porcine) subcutaneous injection 5,000 Units, Q8H JUAN ANTONIO    HYDROmorphone HCl (DILAUDID) injection 0.2 mg, Q4H PRN    insulin lispro (HumALOG/ADMELOG) 100 units/mL subcutaneous injection 1-6 Units, Q6H JUAN ANTONIO **AND** Fingerstick Glucose (POCT), Q6H    lactated ringers infusion, Continuous, Last Rate: 75 mL/hr (02/28/25 0344)    losartan (COZAAR) tablet 100 mg, Daily    ondansetron (ZOFRAN) injection 4 mg, Q6H PRN    oxyCODONE (ROXICODONE) split tablet 2.5 mg, Q4H PRN **OR** oxyCODONE (ROXICODONE) IR tablet 5 mg, Q4H PRN    pantoprazole (PROTONIX) injection 40 mg, Early Morning    pravastatin (PRAVACHOL) tablet 80 mg, Daily With Dinner    tamsulosin (FLOMAX) capsule 0.4 mg, Daily With Dinner    traZODone (DESYREL) tablet 150 mg, HS  VTE Pharmacologic Prophylaxis: {Pharmacologic VTE Prophylaxis:884981265}  VTE Mechanical Prophylaxis: {Mechanical VTE Prophylaxis:00156}    Administrative Statements   {Time Spent (Optional):37971}  Portions of the record may have been created with voice recognition software.  Occasional wrong word or \"sound a like\" substitutions may have occurred due to the inherent limitations of voice recognition software.  Read the chart carefully and recognize, using context, where " substitutions have occurred.  ==  Luke Campbell  [facility]  Internal Medicine Residency PGY-***

## 2025-02-28 NOTE — ANESTHESIA PREPROCEDURE EVALUATION
Procedure:  CYSTOSCOPY URETEROSCOPY WITH LITHOTRIPSY HOLMIUM LASER, RETROGRADE PYELOGRAM AND INSERTION STENT URETERAL (Left: Bladder)    Relevant Problems   ANESTHESIA (within normal limits)      CARDIO   (+) Essential hypertension   (+) Mixed hyperlipidemia      /RENAL   (+) Kidney stone on left side      MUSCULOSKELETAL   (+) Chronic bilateral low back pain with bilateral sciatica      NEURO/PSYCH   (+) Chronic bilateral low back pain with bilateral sciatica   (+) Generalized anxiety disorder      PULMONARY   (+) Acute asthma exacerbation (Not in acute exacerbation, states well controlled)   (+) PETER (obstructive sleep apnea) (Denies CPAP)        Physical Exam    Airway    Mallampati score: III  TM Distance: >3 FB  Neck ROM: full     Dental    upper dentures and lower dentures    Cardiovascular  Cardiovascular exam normal    Pulmonary  Pulmonary exam normal     Other Findings  post-pubertal.      Anesthesia Plan  ASA Score- 2     Anesthesia Type- general with ASA Monitors.         Additional Monitors:     Airway Plan: LMA.           Plan Factors-Exercise tolerance (METS): >4 METS.    Chart reviewed. EKG reviewed. Imaging results reviewed. Existing labs reviewed. Patient summary reviewed.    Patient is not a current smoker.      Obstructive sleep apnea risk education given perioperatively.        Induction- intravenous.    Postoperative Plan- Plan for postoperative opioid use. Planned trial extubation        Informed Consent- Anesthetic plan and risks discussed with patient.  I personally reviewed this patient with the CRNA. Discussed and agreed on the Anesthesia Plan with the CRNA..      NPO Status:  Vitals Value Taken Time   Date of last liquid 02/28/25 02/28/25 1420   Time of last liquid 0000 02/28/25 1420   Date of last solid 02/28/25 02/28/25 1420   Time of last solid 0000 02/28/25 1420

## 2025-02-28 NOTE — ASSESSMENT & PLAN NOTE
Patient presents to the ED with left flank and left upper and lower quadrant pain.  Patient had a gastric bypass done about 8 months ago. Has been experiencing nausea and vomiting due to the severity of pain. Vital signs stable.  ED provider spoke with urology and the recommendation for Flomax, n.p.o. status, fluids and pain management.   ED gave cefepime, Benadryl, Toradol, Reglan, morphine, Zofran, and normal saline bolus.  CT abdomen pelvis-There is mild left hydroureteronephrosis with subtle left perinephric and periureteral hazy inflammatory stranding noted secondary to obstructing 5 mm distal left ureteral stone approximately 2 cm above the left UVJ region.   Blood culture pending.  Urinalysis-positive.  Urine culture-pending.  Plan  Pain management.  As needed oxycodone and breakthrough Dilaudid ordered.  As needed Tylenol.  LR 75 mL/hr.  Flomax ordered.  N.p.o.  Start cefepime.  Urology consulted.  May consider bariatric consult if abdominal pain does not resolve.

## 2025-02-28 NOTE — ASSESSMENT & PLAN NOTE
Lab Results   Component Value Date    HGBA1C 6.5 (H) 02/28/2025       Recent Labs     02/28/25  0341 02/28/25  0640   POCGLU 97 94       Blood Sugar Average: Last 72 hrs:  (P) 95.5    Diabetic Range HbA1C  On Metformin 500 mg BID and Mounjaro at home    Plan:  Hold Metformin and Mounjaro  Add SSI, q6 fingerstick glucose  Continue NPO

## 2025-02-28 NOTE — H&P
H&P - Hospitalist   Name: Vale Felton 49 y.o. female I MRN: [id number]  Unit/Bed#: ED 11 I Date of Admission: 2/27/2025   Date of Service: 2/28/2025 I Hospital Day: 0     Assessment & Plan  Left flank pain  Patient presents to the ED with left flank and left upper and lower quadrant pain.  Patient had a gastric bypass done about 8 months ago. Has been experiencing nausea and vomiting due to the severity of pain. Vital signs stable.  ED provider spoke with urology and the recommendation for Flomax, n.p.o. status, fluids and pain management.   ED gave cefepime, Benadryl, Toradol, Reglan, morphine, Zofran, and normal saline bolus.  CT abdomen pelvis-There is mild left hydroureteronephrosis with subtle left perinephric and periureteral hazy inflammatory stranding noted secondary to obstructing 5 mm distal left ureteral stone approximately 2 cm above the left UVJ region.   Blood culture pending.  Urinalysis-positive.  Urine culture-pending.  Plan  Pain management.  As needed oxycodone and breakthrough Dilaudid ordered.  As needed Tylenol.  LR 75 mL/hr.  Flomax ordered.  N.p.o.  Start cefepime.  Urology consulted.  May consider bariatric consult if abdominal pain does not resolve.   Nausea and vomiting  Patient had complaints of nausea and vomiting upon arrival to ED.  As needed Zofran and Reglan given in ED.  As needed Zofran ordered for patient.  Monitor electrolyte levels.  IV fluid hydration ordered.  CMP and mag ordered in a.m.  Essential hypertension  BP on admission 217/70.  Elevated BP might have been secondary to pain.  Current /67, HR 63.  Continue Coreg and losartan.  Monitor BP and heart rate.  Diabetes mellitus (HCC)  Lab Results   Component Value Date    HGBA1C 6.2 (H) 11/14/2024   Hold oral diabetic medication.  Sliding scale ordered for coverage.  Capillary glucose checks every 6 hours due to n.p.o. status.  Monitor patient for signs and symptoms of hypo and hyperglycemia.    Mixed  hyperlipidemia  Continue alternative for medication.    VTE Pharmacologic Prophylaxis:   Moderate Risk (Score 3-4) - Pharmacological DVT Prophylaxis Ordered: heparin.  Code Status: Full Code  Discussion with family:  Patient.     Anticipated Length of Stay: Patient will be admitted on an inpatient basis with an anticipated length of stay of greater than 2 midnights secondary to kidney stone.    History of Present Illness   Chief Complaint: Left flank pain    Vale Felton is a 49 y.o. female with a PMH of diabetes mellitus, asthma, hypertension and arthritis who presents with left flank and left upper and lower quadrant pain.  Patient had a gastric bypass done about 8 months ago. Has been experiencing nausea and vomiting due to the severity of pain. Vital signs stable.  ED provider spoke with urology and the recommendation for Flomax, n.p.o. status, fluids and pain management. .    Review of Systems   Constitutional:  Negative for chills and fever.   HENT:  Negative for ear pain and sore throat.    Eyes:  Negative for pain and visual disturbance.   Respiratory:  Negative for cough, chest tightness and shortness of breath.    Cardiovascular:  Positive for leg swelling. Negative for chest pain and palpitations.   Gastrointestinal:  Positive for nausea and vomiting. Negative for abdominal distention, abdominal pain, blood in stool and diarrhea.   Genitourinary:  Positive for flank pain. Negative for difficulty urinating, dysuria and hematuria.   Musculoskeletal:  Negative for arthralgias and back pain.   Skin:  Negative for color change and rash.   Neurological:  Negative for dizziness, seizures, syncope and headaches.   Psychiatric/Behavioral:  Negative for behavioral problems and confusion.    All other systems reviewed and are negative.      Historical Information   Past Medical History:   Diagnosis Date    Arthritis     Asthma     Diabetes mellitus (HCC)     Hypertension      Past Surgical History:   Procedure  Laterality Date     SECTION       Social History     Tobacco Use    Smoking status: Former    Smokeless tobacco: Never   Vaping Use    Vaping status: Never Used   Substance and Sexual Activity    Alcohol use: Never    Drug use: No    Sexual activity: Not on file     E-Cigarette/Vaping    E-Cigarette Use Never User      E-Cigarette/Vaping Substances     History reviewed. No pertinent family history.  Social History:  Marital Status: /Civil Union   Occupation: N/A  Patient Pre-hospital Living Situation: Home  Patient Pre-hospital Level of Mobility: walks  Patient Pre-hospital Diet Restrictions: None    Meds/Allergies   I have reviewed home medications using recent Epic encounter.  Prior to Admission medications    Medication Sig Start Date End Date Taking? Authorizing Provider   al mag oxide-diphenhydramine-lidocaine viscous (MAGIC MOUTHWASH) 1:1:1 suspension Swish and spit 10 mL every 4 (four) hours as needed for mouth pain or discomfort 21   Mary Lou Vasquez MD   albuterol (PROVENTIL HFA,VENTOLIN HFA) 90 mcg/act inhaler Inhale 2 puffs every 6 (six) hours as needed for wheezing or shortness of breath 18   Kip Guerrero MD   ALPRAZolam (XANAX) 1 mg tablet Take 1 mg by mouth daily as needed 12   Historical Provider, MD   carvedilol (COREG) 6.25 mg tablet Take 6.25 mg by mouth 2 (two) times a day 23   Historical Provider, MD   famotidine (PEPCID) 20 mg tablet Take 20 mg by mouth 2 (two) times a day 23   Historical Provider, MD   fluticasone (FLONASE) 50 mcg/act nasal spray 2 sprays into each nostril daily As needed for sinus congestion 18   Kip Guerrero MD   gabapentin (NEURONTIN) 400 mg capsule gabapentin 400 mg caps    Historical Provider, MD   losartan (COZAAR) 100 MG tablet Take 100 mg by mouth daily 23   Historical Provider, MD   metFORMIN (GLUCOPHAGE) 500 mg tablet Take 500 mg by mouth 2 (two) times a day with meals 23   Historical Provider,  MD   montelukast (SINGULAIR) 10 mg tablet Take 1 tablet by mouth daily 6/8/15   Historical Provider, MD   Mounjaro 5 MG/0.5ML INJECT 5MG UNDER THE SKIN EVERY 7 DAYS 23   Historical Provider, MD   rosuvastatin (CRESTOR) 10 MG tablet Take 10 mg by mouth daily 23   Historical Provider, MD   TiZANidine (ZANAFLEX) 6 MG capsule tizanidine hcl 6 mg caps    Historical Provider, MD   traZODone (DESYREL) 150 mg tablet Take 150 mg by mouth daily at bedtime 16   Historical Provider, MD     Allergies   Allergen Reactions    Sumatriptan Shortness Of Breath    Amoxicillin     Ampicillin Nausea Only    Penicillins Nausea Only       Objective :  Temp:  [98 °F (36.7 °C)] 98 °F (36.7 °C)  HR:  [60-70] 63  BP: (143-217)/(67-95) 144/67  Resp:  [18-20] 18  SpO2:  [91 %-99 %] 93 %  O2 Device: None (Room air)    Physical Exam  Vitals and nursing note reviewed.   Constitutional:       General: She is not in acute distress.     Appearance: She is well-developed.   HENT:      Head: Normocephalic and atraumatic.      Mouth/Throat:      Mouth: Mucous membranes are moist.   Eyes:      Conjunctiva/sclera: Conjunctivae normal.   Cardiovascular:      Rate and Rhythm: Normal rate and regular rhythm.      Heart sounds: No murmur heard.  Pulmonary:      Effort: Pulmonary effort is normal. No respiratory distress.      Breath sounds: Normal breath sounds.      Comments: Room air  Abdominal:      General: Bowel sounds are decreased.      Palpations: Abdomen is soft.      Tenderness: There is abdominal tenderness in the left upper quadrant and left lower quadrant. There is guarding.   Musculoskeletal:         General: Swelling present.      Cervical back: Neck supple.      Right lower le+ Edema present.      Left lower le+ Edema present.   Skin:     General: Skin is warm and dry.      Capillary Refill: Capillary refill takes less than 2 seconds.   Neurological:      Mental Status: She is alert and oriented to person, place, and  time.      GCS: GCS eye subscore is 4. GCS verbal subscore is 5. GCS motor subscore is 6.   Psychiatric:         Mood and Affect: Mood normal.         Behavior: Behavior is cooperative.        Lines/Drains:      Lab Results: I have reviewed the following results:  Results from last 7 days   Lab Units 02/27/25  2042   WBC Thousand/uL 11.86*   HEMOGLOBIN g/dL 12.4   HEMATOCRIT % 40.8   PLATELETS Thousands/uL 390   SEGS PCT % 81*   LYMPHO PCT % 14   MONO PCT % 4   EOS PCT % 1     Results from last 7 days   Lab Units 02/27/25  2042   SODIUM mmol/L 140   POTASSIUM mmol/L 3.9   CHLORIDE mmol/L 105   CO2 mmol/L 27   BUN mg/dL 18   CREATININE mg/dL 0.89   ANION GAP mmol/L 8   CALCIUM mg/dL 9.3   ALBUMIN g/dL 4.0   TOTAL BILIRUBIN mg/dL 0.68   ALK PHOS U/L 78   ALT U/L 11   AST U/L 12*   GLUCOSE RANDOM mg/dL 167*             Lab Results   Component Value Date    HGBA1C 6.2 (H) 11/14/2024    HGBA1C 6.9 (H) 04/22/2024    HGBA1C 6.1 (H) 10/16/2023     Results from last 7 days   Lab Units 02/27/25  2356 02/27/25  2042   LACTIC ACID mmol/L  --  1.0   PROCALCITONIN ng/ml <0.05  --        Imaging Results Review: I reviewed radiology reports from this admission including: CT abdomen/pelvis.  Other Study Results Review: No additional pertinent studies reviewed.    Administrative Statements   I have spent a total time of 45 minutes in caring for this patient on the day of the visit/encounter including Diagnostic results, Patient and family education, Counseling / Coordination of care, Documenting in the medical record, Reviewing/placing orders in the medical record (including tests, medications, and/or procedures), Obtaining or reviewing history  , and Communicating with other healthcare professionals .       ** Please Note: This note has been constructed using a voice recognition system. **

## 2025-02-28 NOTE — CONSULTS
Consultation - Urology   Name: Vale Felton 49 y.o. female I MRN: [id number]  Unit/Bed#: ED 11 I Date of Admission: 2/27/2025   Date of Service: 2/28/2025 I Hospital Day: 0   Inpatient consult to Urology  Consult performed by: Breana Amato PA-C  Consult ordered by: TWILA Casas        Physician Requesting Evaluation: Pal Gomez,*   Reason for Evaluation / Principal Problem: Nephrolithiasis    Assessment & Plan  Kidney stone on left side  CT scan mild left hydronephrosis due to obstructing 5 mm distal left ureteral stone  Vital signs stable, afebrile  Mild leukocytosis resolved  UA possible infection, moderate leukocytes.  No bacteria.  Denies any urinary symptoms  Patient reports pain 8 out of 10 this a.m.  We discussed medical expulsive therapy versus operative intervention, patient reports due to persistent pain overnight and no stone passage she would like to proceed with operative intervention  Discussed with patient cystoscopy, ureteroscopy, holmium laser lithotripsy, basket stone extraction, retrograde pyelogram, insertion of LEFT ureteral stent.  Discussed risk associated with procedure including risk with anesthesia, bleeding, infection, damage to kidneys, ureter, bladder, inability to treat stone, ureteral stricture, need for delayed ureteroscopy for any signs of infection.  Discussed stent colic including frequency, urgency, hematuria, flank pain.    Nausea and vomiting          Subjective:   HPI: Lis is a 49-year-old female past ministry of diabetes, asthma, arthritis, gastric bypass who presented to the ED with left-sided back pain, nausea, vomiting.  Patient reports acute onset yesterday, thought it was muscle strain.  Due to persistence of pain patient presented to the ED for further evaluation.  She underwent a CT scan which showed a distal 5 mm ureteral calculus on the left side.  She is mated to medicine service for further management.  Patient reports  "persistence of pain overnight and is rating it 8 out of 10.  No further nausea and vomiting in the ED.  Due to persistence of pain patient is pending transfer to [facility] for operative intervention.    Review of Systems   Constitutional:  Negative for chills and fever.   Respiratory:  Negative for cough and shortness of breath.    Cardiovascular:  Negative for chest pain and palpitations.   Gastrointestinal:  Negative for abdominal pain and vomiting.   Genitourinary:  Negative for dysuria and hematuria.   Musculoskeletal:  Negative for arthralgias and back pain.   Skin:  Negative for color change and rash.   Neurological:  Negative for seizures and syncope.   All other systems reviewed and are negative.      Objective:    Vitals: Blood pressure 141/83, pulse (!) 52, temperature 98 °F (36.7 °C), temperature source Temporal, resp. rate 20, height 5' 4\" (1.626 m), weight 102 kg (224 lb), SpO2 98%.,Body mass index is 38.45 kg/m².    Physical Exam  Vitals reviewed.   Constitutional:       General: She is not in acute distress.     Appearance: Normal appearance. She is normal weight. She is not ill-appearing, toxic-appearing or diaphoretic.   HENT:      Head: Normocephalic and atraumatic.      Right Ear: External ear normal.      Left Ear: External ear normal.      Nose: Nose normal.      Mouth/Throat:      Mouth: Mucous membranes are moist.   Eyes:      General: No scleral icterus.     Conjunctiva/sclera: Conjunctivae normal.   Cardiovascular:      Rate and Rhythm: Normal rate.      Pulses: Normal pulses.   Pulmonary:      Effort: Pulmonary effort is normal.   Abdominal:      General: There is no distension.      Tenderness: There is no abdominal tenderness. There is no right CVA tenderness, left CVA tenderness or guarding.   Neurological:      General: No focal deficit present.      Mental Status: She is alert and oriented to person, place, and time. Mental status is at baseline.   Psychiatric:         Mood and Affect: Mood " normal.         Behavior: Behavior normal.         Thought Content: Thought content normal.         Judgment: Judgment normal.         Imaging:  CT ABDOMEN AND PELVIS WITH IV CONTRAST     INDICATION: L flank pain, hx of kidney stones. .     COMPARISON: None.     TECHNIQUE: CT examination of the abdomen and pelvis was performed. Multiplanar 2D reformatted images were created from the source data.     This examination, like all CT scans performed in the [facility], was performed utilizing techniques to minimize radiation dose exposure, including the use of iterative reconstruction and automated exposure control. Radiation dose length   product (DLP) for this visit: 947 mGy-cm     IV Contrast: 100 mL of iohexol  Enteric Contrast: Not administered.     FINDINGS:     ABDOMEN     LOWER CHEST: No clinically significant abnormality in the visualized lower chest.     LIVER/BILIARY TREE: Unremarkable.     GALLBLADDER: Cholelithiasis without findings of acute cholecystitis.     SPLEEN: Unremarkable.     PANCREAS: Unremarkable.     ADRENAL GLANDS: Unremarkable.     KIDNEYS/URETERS: Right kidney is grossly unremarkable. 3.2 cm cyst in the lower pole of the left kidney noted. There is mild left hydroureteronephrosis with subtle left perinephric and periureteral hazy inflammatory stranding noted secondary to   obstructing 5 mm distal left ureteral stone approximately 2 cm above the left UVJ region.     STOMACH AND BOWEL: Sequelae of gastric bypass is seen, new since the prior study. The occluded stomach is contracted and grossly unremarkable. Gastrojejunostomy anastomosis appear grossly intact with mild wall thickening in the region of the anastomosis   and proximal jejunal loop noted which could suggest mild focal inflammation in this region. Small and large bowel loops are normal in course and caliber without obstruction or inflammation. Terminal ileum and appendix are grossly unremarkable without   inflammatory  changes. Mild sigmoid diverticulosis without evidence for acute diverticulitis.     APPENDIX: No findings to suggest appendicitis.     ABDOMINOPELVIC CAVITY: No ascites or loculated fluid collection. No pneumoperitoneum. No lymphadenopathy.     VESSELS: Unremarkable for patient's age.     PELVIS     REPRODUCTIVE ORGANS: Unremarkable for patient's age.     URINARY BLADDER: Mildly distended and grossly unremarkable.     ABDOMINAL WALL/INGUINAL REGIONS: Unremarkable.     BONES: No acute fracture or suspicious osseous lesion. Mild multilevel degenerative changes of the visualized thoracolumbar spine and bilateral hip joints.     IMPRESSION:     1.  There is mild left hydroureteronephrosis with subtle left perinephric and periureteral hazy inflammatory stranding noted secondary to obstructing 5 mm distal left ureteral stone approximately 2 cm above the left UVJ region.  2.  Sequelae of gastric bypass is seen, new since the prior study. Mild wall thickening in the region of the gastrojejunostomy anastomosis and proximal jejunal loop noted which could suggest mild focal inflammation in this region. No evidence of bowel   obstruction, mesenteric inflammation, appendicitis, free air, or free fluid. Sigmoid diverticulosis without evidence for diverticulitis.  3.  Cholelithiasis without evidence for acute cholecystitis or significant biliary ductal dilatation.        Workstation performed: [id number]       Labs:  Recent Labs     02/27/25 2042 02/28/25  0413   WBC 11.86* 10.15       Recent Labs     02/27/25 2042 02/28/25  0413   HGB 12.4 10.7*     Recent Labs     02/27/25 2042 02/28/25  0413   HCT 40.8 34.3*     Recent Labs     02/27/25 2042 02/28/25  0413   CREATININE 0.89 0.90         History:    Past Medical History:   Diagnosis Date    Arthritis     Asthma     Diabetes mellitus (HCC)     Hypertension      Social History     Socioeconomic History    Marital status: /Civil Union     Spouse name: None    Number of  children: None    Years of education: None    Highest education level: None   Occupational History    None   Tobacco Use    Smoking status: Former    Smokeless tobacco: Never   Vaping Use    Vaping status: Never Used   Substance and Sexual Activity    Alcohol use: Never    Drug use: No    Sexual activity: None   Other Topics Concern    None   Social History Narrative    None     Social Drivers of Health     Financial Resource Strain: Not At Risk (2025)    Received from [facility]    Financial Insecurity     In the last 12 months did you skip medications to save money?: No     In the last 12 months was there a time when you needed to see a doctor but could not because of cost?: No   Food Insecurity: No Food Insecurity (2025)    Received from [facility]    Food Insecurity     In the last 12 months did you ever eat less than you felt you should because there wasn't enough money for food?: No   Transportation Needs: No Transportation Needs (2025)    Received from [facility]    Transportation Needs     In the last 12 months have you ever had to go without healthcare because you didn't have a way to get there?: No   Physical Activity: Not on file   Stress: Not on file   Social Connections: Socially Integrated (2025)    Received from [facility]    Social Connection     Do you often feel lonely?: No   Intimate Partner Violence: Not At Risk (2024)    Received from [facility]    Humiliation, Afraid, Rape, and Kick questionnaire     Fear of Current or Ex-Partner: No     Emotionally Abused: No     Physically Abused: No     Sexually Abused: No   Housing Stability: Not At Risk (2025)    Received from [facility]    Housing Stability     Are you worried that in the next 2 months you may not have stable housing?: No     Past Surgical History:   Procedure Laterality Date     SECTION        History reviewed. No pertinent family history.    Breana Amato PA-C  Date: 2/28/2025 Time: 11:26 AM

## 2025-02-28 NOTE — ASSESSMENT & PLAN NOTE
Likely secondary to pain from nephrolithiasis, unable to tolerate PO contrast for CT scan  Received Zofran, Reglan, Benadryl in ED    Plan:  Electrolyte repletion: 2 g MgSO4 IVPB  PRN Zofran  Maintenance fluids

## 2025-02-28 NOTE — ASSESSMENT & PLAN NOTE
Lab Results   Component Value Date    HGBA1C 6.2 (H) 11/14/2024   Hold oral diabetic medication.  Sliding scale ordered for coverage.  Capillary glucose checks every 6 hours due to n.p.o. status.  Monitor patient for signs and symptoms of hypo and hyperglycemia.

## 2025-02-28 NOTE — ASSESSMENT & PLAN NOTE
CT scan mild left hydronephrosis due to obstructing 5 mm distal left ureteral stone  Vital signs stable, afebrile  Mild leukocytosis resolved  UA possible infection, moderate leukocytes.  No bacteria.  Denies any urinary symptoms  Patient reports pain 8 out of 10 this a.m.  We discussed medical expulsive therapy versus operative intervention, patient reports due to persistent pain overnight and no stone passage she would like to proceed with operative intervention  Discussed with patient cystoscopy, ureteroscopy, holmium laser lithotripsy, basket stone extraction, retrograde pyelogram, insertion of LEFT ureteral stent.  Discussed risk associated with procedure including risk with anesthesia, bleeding, infection, damage to kidneys, ureter, bladder, inability to treat stone, ureteral stricture, need for delayed ureteroscopy for any signs of infection.  Discussed stent colic including frequency, urgency, hematuria, flank pain.

## 2025-02-28 NOTE — H&P
Attested           Attestation signed by Pedro Longo MD at 2/28/2025  2:36 PM     Attending Attestation::     I supervised the Advanced Practitioner on 2/28/2025.? I performed, in its entirety, the assessment and plan of the visit.  I agree with the Advanced Practitioner's note with the following additions/exceptions:     Assessment:  Obstructing left ureteral calculus     Plan:  Patient transferred for intervention.  CT images reviewed with the patient.  Still having renal colic.  Technique, risk, benefits of left ureteroscopy, laser, stone extraction, stent placement reviewed in detail.  All questions answered to her satisfaction.  She wishes to proceed.  Electronic consent obtained.            Expand All Collapse All      Name: Vale Felton 49 y.o. female I MRN: [id number]     Date of Service: 2/28/2025 I Hospital Day: 0      Assessment & Plan  Kidney stone on left side  CT scan mild left hydronephrosis due to obstructing 5 mm distal left ureteral stone  Vital signs stable, afebrile  Mild leukocytosis resolved  UA possible infection, moderate leukocytes.  No bacteria.  Denies any urinary symptoms  Patient reports pain 8 out of 10 this a.m.  We discussed medical expulsive therapy versus operative intervention, patient reports due to persistent pain overnight and no stone passage she would like to proceed with operative intervention  Discussed with patient cystoscopy, ureteroscopy, holmium laser lithotripsy, basket stone extraction, retrograde pyelogram, insertion of LEFT ureteral stent.  Discussed risk associated with procedure including risk with anesthesia, bleeding, infection, damage to kidneys, ureter, bladder, inability to treat stone, ureteral stricture, need for delayed ureteroscopy for any signs of infection.  Discussed stent colic including frequency, urgency, hematuria, flank pain.     Nausea and vomiting              Subjective:   HPI: Lis is a 49-year-old female past CiDRA  "diabetes, asthma, arthritis, gastric bypass who presented to the ED with left-sided back pain, nausea, vomiting.  Patient reports acute onset yesterday, thought it was muscle strain.  Due to persistence of pain patient presented to the ED for further evaluation.  She underwent a CT scan which showed a distal 5 mm ureteral calculus on the left side.  She is mated to medicine service for further management.  Patient reports persistence of pain overnight and is rating it 8 out of 10.  No further nausea and vomiting in the ED.  Due to persistence of pain patient is pending transfer to [facility] for operative intervention.     Review of Systems   Constitutional:  Negative for chills and fever.   Respiratory:  Negative for cough and shortness of breath.    Cardiovascular:  Negative for chest pain and palpitations.   Gastrointestinal:  Negative for abdominal pain and vomiting.   Genitourinary:  Negative for dysuria and hematuria.   Musculoskeletal:  Negative for arthralgias and back pain.   Skin:  Negative for color change and rash.   Neurological:  Negative for seizures and syncope.   All other systems reviewed and are negative.        Objective:     Vitals: Blood pressure 141/83, pulse (!) 52, temperature 98 °F (36.7 °C), temperature source Temporal, resp. rate 20, height 5' 4\" (1.626 m), weight 102 kg (224 lb), SpO2 98%.,Body mass index is 38.45 kg/m².    Physical Exam  Vitals reviewed.   Constitutional:       General: She is not in acute distress.     Appearance: Normal appearance. She is normal weight. She is not ill-appearing, toxic-appearing or diaphoretic.   HENT:      Head: Normocephalic and atraumatic.      Right Ear: External ear normal.      Left Ear: External ear normal.      Nose: Nose normal.      Mouth/Throat:      Mouth: Mucous membranes are moist.   Eyes:      General: No scleral icterus.     Conjunctiva/sclera: Conjunctivae normal.   Cardiovascular:      Rate and Rhythm: Normal rate.      Pulses: Normal " pulses.   Pulmonary:      Effort: Pulmonary effort is normal.   Abdominal:      General: There is no distension.      Tenderness: There is no abdominal tenderness. There is no right CVA tenderness, left CVA tenderness or guarding.   Neurological:      General: No focal deficit present.      Mental Status: She is alert and oriented to person, place, and time. Mental status is at baseline.   Psychiatric:         Mood and Affect: Mood normal.         Behavior: Behavior normal.         Thought Content: Thought content normal.         Judgment: Judgment normal.            Imaging:  CT ABDOMEN AND PELVIS WITH IV CONTRAST     INDICATION: L flank pain, hx of kidney stones. .     COMPARISON: None.     TECHNIQUE: CT examination of the abdomen and pelvis was performed. Multiplanar 2D reformatted images were created from the source data.     This examination, like all CT scans performed in the [facility], was performed utilizing techniques to minimize radiation dose exposure, including the use of iterative reconstruction and automated exposure control. Radiation dose length   product (DLP) for this visit: 947 mGy-cm     IV Contrast: 100 mL of iohexol  Enteric Contrast: Not administered.     FINDINGS:     ABDOMEN     LOWER CHEST: No clinically significant abnormality in the visualized lower chest.     LIVER/BILIARY TREE: Unremarkable.     GALLBLADDER: Cholelithiasis without findings of acute cholecystitis.     SPLEEN: Unremarkable.     PANCREAS: Unremarkable.     ADRENAL GLANDS: Unremarkable.     KIDNEYS/URETERS: Right kidney is grossly unremarkable. 3.2 cm cyst in the lower pole of the left kidney noted. There is mild left hydroureteronephrosis with subtle left perinephric and periureteral hazy inflammatory stranding noted secondary to   obstructing 5 mm distal left ureteral stone approximately 2 cm above the left UVJ region.     STOMACH AND BOWEL: Sequelae of gastric bypass is seen, new since the prior study.  The occluded stomach is contracted and grossly unremarkable. Gastrojejunostomy anastomosis appear grossly intact with mild wall thickening in the region of the anastomosis   and proximal jejunal loop noted which could suggest mild focal inflammation in this region. Small and large bowel loops are normal in course and caliber without obstruction or inflammation. Terminal ileum and appendix are grossly unremarkable without   inflammatory changes. Mild sigmoid diverticulosis without evidence for acute diverticulitis.     APPENDIX: No findings to suggest appendicitis.     ABDOMINOPELVIC CAVITY: No ascites or loculated fluid collection. No pneumoperitoneum. No lymphadenopathy.     VESSELS: Unremarkable for patient's age.     PELVIS     REPRODUCTIVE ORGANS: Unremarkable for patient's age.     URINARY BLADDER: Mildly distended and grossly unremarkable.     ABDOMINAL WALL/INGUINAL REGIONS: Unremarkable.     BONES: No acute fracture or suspicious osseous lesion. Mild multilevel degenerative changes of the visualized thoracolumbar spine and bilateral hip joints.     IMPRESSION:     1.  There is mild left hydroureteronephrosis with subtle left perinephric and periureteral hazy inflammatory stranding noted secondary to obstructing 5 mm distal left ureteral stone approximately 2 cm above the left UVJ region.  2.  Sequelae of gastric bypass is seen, new since the prior study. Mild wall thickening in the region of the gastrojejunostomy anastomosis and proximal jejunal loop noted which could suggest mild focal inflammation in this region. No evidence of bowel   obstruction, mesenteric inflammation, appendicitis, free air, or free fluid. Sigmoid diverticulosis without evidence for diverticulitis.  3.  Cholelithiasis without evidence for acute cholecystitis or significant biliary ductal dilatation.        Workstation performed: [id number]        Labs:       Recent Labs     02/27/25  2042 02/28/25  0413   WBC 11.86* 10.15              Recent Labs     02/27/25 2042 02/28/25  0413   HGB 12.4 10.7*           Recent Labs     02/27/25 2042 02/28/25  0413   HCT 40.8 34.3*           Recent Labs     02/27/25 2042 02/28/25  0413   CREATININE 0.89 0.90            History:     Medical History        Past Medical History:   Diagnosis Date    Arthritis      Asthma      Diabetes mellitus (HCC)      Hypertension           Social History   Social History            Socioeconomic History    Marital status: /Civil Union       Spouse name: None    Number of children: None    Years of education: None    Highest education level: None   Occupational History    None   Tobacco Use    Smoking status: Former    Smokeless tobacco: Never   Vaping Use    Vaping status: Never Used   Substance and Sexual Activity    Alcohol use: Never    Drug use: No    Sexual activity: None   Other Topics Concern    None   Social History Narrative    None      Social Drivers of Health           Financial Resource Strain: Not At Risk (2/6/2025)     Received from [facility]     Financial Insecurity      In the last 12 months did you skip medications to save money?: No      In the last 12 months was there a time when you needed to see a doctor but could not because of cost?: No   Food Insecurity: No Food Insecurity (2/6/2025)     Received from [facility]     Food Insecurity      In the last 12 months did you ever eat less than you felt you should because there wasn't enough money for food?: No   Transportation Needs: No Transportation Needs (2/6/2025)     Received from [facility]     Transportation Needs      In the last 12 months have you ever had to go without healthcare because you didn't have a way to get there?: No   Physical Activity: Not on file   Stress: Not on file   Social Connections: Socially Integrated (2/6/2025)     Received from [facility]     Social Connection      Do you often feel lonely?: No    Intimate Partner Violence: Not At Risk (2024)     Received from [facility]     Humiliation, Afraid, Rape, and Kick questionnaire      Fear of Current or Ex-Partner: No      Emotionally Abused: No      Physically Abused: No      Sexually Abused: No   Housing Stability: Not At Risk (2025)     Received from [facility]     Housing Stability      Are you worried that in the next 2 months you may not have stable housing?: No         Surgical History         Past Surgical History:   Procedure Laterality Date     SECTION             Family History   History reviewed. No pertinent family history.        Breana Amato PA-C  Date: 2025 Time: 11:26 AM                Cosigned by: Pedro Longo MD at 2025  2:36 PM

## 2025-02-28 NOTE — ASSESSMENT & PLAN NOTE
Component  Ref Range & Units 11/14/24 1510   Cholesterol  <200 mg/dL 134   Triglycerides  <150 mg/dL 115   Cholesterol, HDL, Direct  23 - 92 mg/dL 69   Cholesterol, Non-HDL  <160 mg/dL 65   LDL Cholesterol  <130 mg/dL 42       Plan:  Continue home dose of Rosuvastatin 10 mg

## 2025-02-28 NOTE — ASSESSMENT & PLAN NOTE
BP on admission 217/70.  Elevated BP might have been secondary to pain.  Current /67, HR 63.  Continue Coreg and losartan.  Monitor BP and heart rate.

## 2025-02-28 NOTE — ASSESSMENT & PLAN NOTE
"Presented to ED for x3 days of stabbing, left-sided flank pain radiating to LL quadrant with associated nausea/vomiting  Remote history of kidney stone, \"years ago\"  Labs 2/27: WBC 11.86, Cr 0.89, BUN 18, Lipase 15,            2/28: WBC 10.15, Cr .90, BUN 19  UA 2/27: moderate leukocytes, nitrite negative, trace proteins and ketones, no bacteria  CT 2/27: There is mild left hydroureteronephrosis with subtle left perinephric and periureteral hazy inflammatory stranding noted secondary to obstructing 5 mm distal left ureteral stone approximately 2 cm above the left UVJ region.     Received x2 dose of Cefepime    ED provider spoke to urology who recommended : Flomax, NPO, maintenance fluid, pain management and transfer to [facility] for OR given continued symptoms       Plan:   F/u urine culture and blood cultures  Trend procalcitonin  Urology intervention  "

## 2025-02-28 NOTE — OP NOTE
OPERATIVE REPORT  PATIENT NAME: Vale Felton    :  1975  MRN: [id number]  Pt Location: [facility] ROOM 01    SURGERY DATE: 2025    Surgeons and Role:     * Pedro Longo MD - Primary    Preop Diagnosis:  Flank pain [R10.9]  Kidney stone [N20.0]  Left flank pain [R10.9]    Post-Op Diagnosis Codes:     * Flank pain [R10.9]     * Kidney stone [N20.0]     * Left flank pain [R10.9]    Procedure(s):  Left - CYSTOSCOPY URETEROSCOPY. BASKET STONE EXTRACTION RETROGRADE PYELOGRAM AND INSERTION STENT URETERAL    Specimen(s):  ID Type Source Tests Collected by Time Destination   A : Left Uretral Stone Calculus Ureter, Left STONE ANALYSIS Pedro Longo MD 2025 1508        Estimated Blood Loss:   Minimal    Drains:  * No LDAs found *    Anesthesia Type:   General    Operative Indications:  Flank pain [R10.9]  Kidney stone [N20.0]  Left flank pain [R10.9]      Operative Findings:  Left ureteral calculus      Complications:   None    Procedure and Technique:  The patient was identified, brought to the operating room, and placed on the table in supine position.  After induction of general anesthesia, the patient was placed in dorsal lithotomy position and prepped and draped in the usual sterile fashion.  A complete formal timeout was performed.    The 22 Mohawk rigid cystoscope was placed per urethra and cystoscopy was performed.  There was no bladder abnormality identified.  The Left ureteral orifice was identified and cannulated with a Solo wire.  A semirigid ureteroscope was then placed alongside the wire into the ureter, and the stone was identified.  A 4 wire stone basket was placed and the stone was retrieved.  At this point, a retrograde pyelogram was performed delineating the upper urinary tract anatomy.  No further filling defect identified.  The ureteral length was measured.  The safety wire was backloaded into the cystoscope and a 28 cm x 6 Mohawk double-J stent was placed with string.      The patient tolerated the procedure well and was transferred to the recovery room awake alert and in stable condition.    Plan: stent/string for 3-5 days. To be removed in local office.     I was present for the entire procedure.    Patient Disposition:  PACU              SIGNATURE: Pedro Longo MD  DATE: February 28, 2025  TIME: 3:26 PM

## 2025-02-28 NOTE — ANESTHESIA POSTPROCEDURE EVALUATION
Post-Op Assessment Note    CV Status:  Stable    Pain management: adequate       Mental Status:  Alert and awake   Hydration Status:  Euvolemic   PONV Controlled:  Controlled   Airway Patency:  Patent     Post Op Vitals Reviewed: Yes    No anethesia notable event occurred.    Staff: CRNA, Anesthesiologist           Last Filed PACU Vitals:  Vitals Value Taken Time   Temp     Pulse 74 02/28/25 1530   /91    Resp 23 02/28/25 1530   SpO2 93 % 02/28/25 1530   Vitals shown include unfiled device data.

## 2025-02-28 NOTE — EMTALA/ACUTE CARE TRANSFER
[facility] EMERGENCY DEPARTMENT  [address]  [facility] [address]  Dept: [phone]      EMTALA TRANSFER CONSENT    NAME Vale Felton                                         1975                              MRN [id number]    I have been informed of my rights regarding examination, treatment, and transfer   by Dr. Pal Gomez,*    Benefits:    Provide higher level of care/urology service in OR not available here    Risks:    Risks inherit to transportation    Transfer Request   I acknowledge that my medical condition has been evaluated and explained to me by the emergency department physician or other qualified medical person and/or my attending physician who has recommended and offered to me further medical examination and treatment. I understand the Hospital's obligation with respect to the treatment and stabilization of my emergency medical condition. I nevertheless request to be transferred. I release the Hospital, the doctor, and any other persons caring for me from all responsibility or liability for any injury or ill effects that may result from my transfer and agree to accept all responsibility for the consequences of my choice to transfer, rather than receive stabilizing treatment at the Hospital. I understand that because the transfer is my request, my insurance may not provide reimbursement for the services.  The Hospital will assist and direct me and my family in how to make arrangements for transfer, but the hospital is not liable for any fees charged by the transport service.  In spite of this understanding, I refuse to consent to further medical examination and treatment which has been offered to me, and request transfer to  . I authorize the performance of emergency medical procedures and treatments upon me in both transit and upon arrival at the receiving facility.  Additionally, I authorize the release of any and all medical records to the  receiving facility and request they be transported with me, if possible.    I authorize the performance of emergency medical procedures and treatments upon me in both transit and upon arrival at the receiving facility.  Additionally, I authorize the release of any and all medical records to the receiving facility and request they be transported with me, if possible.  I understand that the safest mode of transportation during a medical emergency is an ambulance and that the Hospital advocates the use of this mode of transport. Risks of traveling to the receiving facility by car, including absence of medical control, life sustaining equipment, such as oxygen, and medical personnel has been explained to me and I fully understand them.    (JAMIL CORRECT BOX BELOW)  [  ]  I consent to the stated transfer and to be transported by ambulance/helicopter.  [  ]  I consent to the stated transfer, but refuse transportation by ambulance and accept full responsibility for my transportation by car.  I understand the risks of non-ambulance transfers and I exonerate the Hospital and its staff from any deterioration in my condition that results from this refusal.    X___________________________________________    DATE  25  TIME________  Signature of patient or legally responsible individual signing on patient behalf           RELATIONSHIP TO PATIENT_________________________          Provider Certification    NAME Vale Felton                                         1975                              MRN [id number]    A medical screening exam was performed on the above named patient.  Based on the examination:    Condition Necessitating Transfer The primary encounter diagnosis was Flank pain. Diagnoses of Kidney stone and Left flank pain were also pertinent to this visit.    Patient Condition:      Reason for Transfer:      Transfer Requirements: Facility     Space available and qualified personnel available for treatment  as acknowledged by    Agreed to accept transfer and to provide appropriate medical treatment as acknowledged by          Appropriate medical records of the examination and treatment of the patient are provided at the time of transfer   STAFF INITIAL WHEN COMPLETED _______  Transfer will be performed by qualified personnel from    and appropriate transfer equipment as required, including the use of necessary and appropriate life support measures.    Provider Certification: I have examined the patient and explained the following risks and benefits of being transferred/refusing transfer to the patient/family:         Based on these reasonable risks and benefits to the patient and/or the unborn child(carl), and based upon the information available at the time of the patient’s examination, I certify that the medical benefits reasonably to be expected from the provision of appropriate medical treatments at another medical facility outweigh the increasing risks, if any, to the individual’s medical condition, and in the case of labor to the unborn child, from effecting the transfer.    X____________________________________________ DATE 02/28/25        TIME_______      ORIGINAL - SEND TO MEDICAL RECORDS   COPY - SEND WITH PATIENT DURING TRANSFER

## 2025-02-28 NOTE — CASE MANAGEMENT
Case Management Assessment & Discharge Planning Note    Patient name Vale Felton  Location ED 11/ED 11 MRN [id number]  : 1975 Date 2025       Current Admission Date: 2025  Current Admission Diagnosis:Kidney stone on left side   Patient Active Problem List    Diagnosis Date Noted Date Diagnosed    Kidney stone on left side 2025     Nausea and vomiting 2025     Diabetes mellitus (HCC) 2025     Acute asthma exacerbation 2024     Mixed hyperlipidemia 10/16/2023     Chronic bilateral low back pain with bilateral sciatica 2023    Sleep related hypoxia      PETER (obstructive sleep apnea)      Generalized anxiety disorder 2012     Essential hypertension 2012       LOS (days): 0  Geometric Mean LOS (GMLOS) (days): 2.1  Days to GMLOS:1.6     OBJECTIVE:    Risk of Unplanned Readmission Score: 8.44         Current admission status: Inpatient       Preferred Pharmacy:   [id number] - [facility] [place] - [address]  [address] [place] [address] [place] [address]  Phone: [phone] Fax: [phone]    Primary Care Provider: Roberto Carvajal MD    Primary Insurance: [employer] [facility]  Secondary Insurance:     ASSESSMENT:  Active Health Care Proxies    There are no active Health Care Proxies on file.       Advance Directives  Does patient have a Health Care POA?: No  Was patient offered paperwork?: Yes (Provided at bedside)  Does patient currently have a Health Care decision maker?: No  Does patient have Advance Directives?: No  Was patient offered paperwork?: Yes (Provided at bedside)  Primary Contact: Pascual Santos/ ([phone])    Readmission Root Cause  30 Day Readmission: No    Patient Information  Admitted from:: Home  Mental Status: Alert  During Assessment patient was accompanied by: Not accompanied during assessment  Assessment information provided by:: Patient  Primary Caregiver: Self  Support Systems: Spouse/significant other,  Family members  County of Residence: [place]  What [facility] do you live in?: [place]  Home entry access options. Select all that apply.: No steps to enter home  Type of Current Residence: 2 story home  Upon entering residence, is there a bedroom on the main floor (no further steps)?: No  A bedroom is located on the following floor levels of residence (select all that apply):: 2nd Floor  Upon entering residence, is there a bathroom on the main floor (no further steps)?: Yes  Number of steps to 2nd floor from main floor: One Flight  Living Arrangements: Lives w/ Spouse/significant other, Lives w/ Family members (Lives with , 3 daughters, grandchildren, and pt's father)    Activities of Daily Living Prior to Admission  Functional Status: Independent  Completes ADLs independently?: Yes  Ambulates independently?: Yes  Does patient use assisted devices?: Yes  Assisted Devices (DME) used: Straight Cane (On occasion)  Does patient currently own DME?: Yes  What DME does the patient currently own?: Straight Cane  Does patient have a history of Outpatient Therapy (PT/OT)?: Yes  Does the patient have a history of Short-Term Rehab?: No  Does patient have a history of HHC?: No  Does patient currently have HHC?: No    Patient Information Continued  Income Source: SSI/SSD  Does patient have prescription coverage?: Yes  Does patient have a history of substance abuse?: No  Does patient have a history of Mental Health Diagnosis?: Yes (Anxiety)  Is patient receiving treatment for mental health?: Yes (Med management by PCP)  Has patient received inpatient treatment related to mental health in the last 2 years?: No    Means of Transportation  Means of Transport to [facility]:: Drives Self    DISCHARGE DETAILS:    Discharge planning discussed with:: Patient     CM contacted family/caregiver?: No- see comments  Were Treatment Team discharge recommendations reviewed with patient/caregiver?: Yes  Did patient/caregiver verbalize understanding  of patient care needs?: Yes  Were patient/caregiver advised of the risks associated with not following Treatment Team discharge recommendations?: Yes    Contacts  Patient Contacts: Patient  Contact Method: In Person  Reason/Outcome: Continuity of Care, Emergency Contact, Discharge Planning    Other Referral/Resources/Interventions Provided:  Interventions: None Indicated    Treatment Team Recommendation: Home  Discharge Destination Plan:: Home  Transport at Discharge : Family     Additional Comments: Patient reports residing in a 2 story house with her father, , daughters, and grandchildren. Pt states that she is independent with ADLs and occassionally uses a cane for ambulation. Pt reports a history of OPPT, no indicated history of HHC, STR, inpatient MH treatment, or D/A treatment. No discharge concerns or needs expressed or identified at this time, CM department to remain available.

## 2025-02-28 NOTE — DISCHARGE SUMMARY
Discharge Summary - Hospitalist   Name: Vale Felton 49 y.o. female I MRN: [id number]  Unit/Bed#: ED 11 I Date of Admission: 2/27/2025   Date of Service: 2/28/2025 I Hospital Day: 0       Discharge Diagnosis:    Nephrolithiasis  Renal colic  Mild hydronephrosis on the left  Anemia, suspect dilutional  CT finding of cholelithiasis  CT finding of mild focal inflammation in the previous area of bariatric surgery    Discharging Physician / Practitioner: Pal Casas MD  PCP: Roberto Carvajal MD  Admission Date:   Admission Orders (From admission, onward)       Ordered        02/28/25 0012  INPATIENT ADMISSION  Once                          Discharge Date: 02/28/25      Consultations During Hospital Stay:  Urology    Significant Findings / Test Results:   CT abdomen pelvis with contrast [[id number]] Collected: 02/27/25 2227   Order Status: Completed Updated: 02/27/25 2253   Narrative:     CT ABDOMEN AND PELVIS WITH IV CONTRAST    INDICATION: L flank pain, hx of kidney stones. .    COMPARISON: None.    TECHNIQUE: CT examination of the abdomen and pelvis was performed. Multiplanar 2D reformatted images were created from the source data.    This examination, like all CT scans performed in the [facility], was performed utilizing techniques to minimize radiation dose exposure, including the use of iterative reconstruction and automated exposure control. Radiation dose length  product (DLP) for this visit: 947 mGy-cm    IV Contrast: 100 mL of iohexol  Enteric Contrast: Not administered.    FINDINGS:    ABDOMEN    LOWER CHEST: No clinically significant abnormality in the visualized lower chest.    LIVER/BILIARY TREE: Unremarkable.    GALLBLADDER: Cholelithiasis without findings of acute cholecystitis.    SPLEEN: Unremarkable.    PANCREAS: Unremarkable.    ADRENAL GLANDS: Unremarkable.    KIDNEYS/URETERS: Right kidney is grossly unremarkable. 3.2 cm cyst in the lower pole of the left kidney noted. There  is mild left hydroureteronephrosis with subtle left perinephric and periureteral hazy inflammatory stranding noted secondary to  obstructing 5 mm distal left ureteral stone approximately 2 cm above the left UVJ region.    STOMACH AND BOWEL: Sequelae of gastric bypass is seen, new since the prior study. The occluded stomach is contracted and grossly unremarkable. Gastrojejunostomy anastomosis appear grossly intact with mild wall thickening in the region of the anastomosis  and proximal jejunal loop noted which could suggest mild focal inflammation in this region. Small and large bowel loops are normal in course and caliber without obstruction or inflammation. Terminal ileum and appendix are grossly unremarkable without  inflammatory changes. Mild sigmoid diverticulosis without evidence for acute diverticulitis.    APPENDIX: No findings to suggest appendicitis.    ABDOMINOPELVIC CAVITY: No ascites or loculated fluid collection. No pneumoperitoneum. No lymphadenopathy.    VESSELS: Unremarkable for patient's age.    PELVIS    REPRODUCTIVE ORGANS: Unremarkable for patient's age.    URINARY BLADDER: Mildly distended and grossly unremarkable.    ABDOMINAL WALL/INGUINAL REGIONS: Unremarkable.    BONES: No acute fracture or suspicious osseous lesion. Mild multilevel degenerative changes of the visualized thoracolumbar spine and bilateral hip joints.   Impression:       1.  There is mild left hydroureteronephrosis with subtle left perinephric and periureteral hazy inflammatory stranding noted secondary to obstructing 5 mm distal left ureteral stone approximately 2 cm above the left UVJ region.  2.  Sequelae of gastric bypass is seen, new since the prior study. Mild wall thickening in the region of the gastrojejunostomy anastomosis and proximal jejunal loop noted which could suggest mild focal inflammation in this region. No evidence of bowel  obstruction, mesenteric inflammation, appendicitis, free air, or free fluid. Sigmoid  "diverticulosis without evidence for diverticulitis.  3.  Cholelithiasis without evidence for acute cholecystitis or significant biliary ductal dilatation.        Outpatient follow up Requested:  PCP    Complications:  None    Reason for Admission: Renal colic    HPI:  Vale Felton is a 49 y.o. female with a PMH of diabetes mellitus, asthma, hypertension and arthritis who presents with left flank and left upper and lower quadrant pain.  Patient had a gastric bypass done about 8 months ago. Has been experiencing nausea and vomiting due to the severity of pain. Vital signs stable.  ED provider spoke with urology and the recommendation for Flomax, n.p.o. status, fluids and pain management. .     Hospital Course:     The patient was hospitalized.  She was treated with IV fluids but supportive care was not enough and she did have significant pain with ongoing renal colic.  Urology does recommend transfer to [facility] for OR today for potential stone removal and stenting.  Fortunately at the time of discharge she is hemodynamically stable, kidney function preserved.  Being transferred to [facility] in stable condition.  Currently n.p.o.    Condition at Discharge: good     Discharge Day Visit / Exam:     Subjective:    Patient is still in significant pain.  Vitals: Blood Pressure: 141/83 (02/28/25 1000)  Pulse: (!) 52 (02/28/25 1000)  Temperature: 98 °F (36.7 °C) (02/27/25 1937)  Temp Source: Temporal (02/27/25 1937)  Respirations: 20 (02/28/25 1000)  Height: 5' 4\" (162.6 cm) (02/27/25 1937)  Weight - Scale: 102 kg (224 lb) (02/27/25 1937)  SpO2: 98 % (02/28/25 1000)    Exam:   Physical Exam  Vitals and nursing note reviewed.   Constitutional:       Appearance: Normal appearance. She is normal weight.      Comments: Female in bed, awake   HENT:      Head: Normocephalic and atraumatic.      Right Ear: External ear normal.      Left Ear: External ear normal.      Nose: Nose normal. No congestion.      " Mouth/Throat:      Mouth: Mucous membranes are moist.      Pharynx: Oropharynx is clear. No oropharyngeal exudate or posterior oropharyngeal erythema.   Eyes:      General: No scleral icterus.        Right eye: No discharge.         Left eye: No discharge.      Extraocular Movements: Extraocular movements intact.      Conjunctiva/sclera: Conjunctivae normal.      Pupils: Pupils are equal, round, and reactive to light.   Cardiovascular:      Rate and Rhythm: Normal rate and regular rhythm.      Pulses: Normal pulses.      Heart sounds: Normal heart sounds. No murmur heard.     No friction rub. No gallop.   Pulmonary:      Effort: Pulmonary effort is normal. No respiratory distress.      Breath sounds: Normal breath sounds. No stridor. No wheezing, rhonchi or rales.   Chest:      Chest wall: No tenderness.   Abdominal:      General: Abdomen is flat. Bowel sounds are normal. There is no distension.      Palpations: Abdomen is soft. There is no mass.      Tenderness: There is no abdominal tenderness. There is left CVA tenderness. There is no guarding or rebound.   Musculoskeletal:         General: No swelling, tenderness, deformity or signs of injury. Normal range of motion.      Cervical back: Normal range of motion and neck supple. No rigidity. No muscular tenderness.   Skin:     General: Skin is warm and dry.      Capillary Refill: Capillary refill takes less than 2 seconds.      Coloration: Skin is not jaundiced or pale.      Findings: No bruising, erythema, lesion or rash.   Neurological:      General: No focal deficit present.      Mental Status: She is alert and oriented to person, place, and time. Mental status is at baseline.      Cranial Nerves: No cranial nerve deficit.      Sensory: No sensory deficit.      Motor: No weakness.      Coordination: Coordination normal.   Psychiatric:         Mood and Affect: Mood normal.         Behavior: Behavior normal.         Thought Content: Thought content normal.          Judgment: Judgment normal.           Discussion with Family: Patient, attempted to call , did not     Discharge instructions/Information to patient and family:   See after visit summary for information provided to patient and family.      Provisions for Follow-Up Care:  See after visit summary for information related to follow-up care and any pertinent home health orders.      Disposition:     Home    For Discharges to [facility]:   Not Applicable to this Patient - Not Applicable to this Patient    Planned Readmission: No     Discharge Statement:  I spent 76 minutes discharging the patient. This time was spent on the day of discharge. I had direct contact with the patient on the day of discharge. Greater than 50% of the total time was spent examining patient, answering all patient questions, arranging and discussing plan of care with patient as well as directly providing post-discharge instructions.  Additional time then spent on discharge activities.    Discharge Medications:  See after visit summary for reconciled discharge medications provided to patient and family.      ** Please Note: This note has been constructed using a voice recognition system **

## 2025-02-28 NOTE — ASSESSMENT & PLAN NOTE
Patient had complaints of nausea and vomiting upon arrival to ED.  As needed Zofran and Reglan given in ED.  As needed Zofran ordered for patient.  Monitor electrolyte levels.  IV fluid hydration ordered.  CMP and mag ordered in a.m.

## 2025-03-01 ENCOUNTER — RESULTS FOLLOW-UP (OUTPATIENT)
Dept: EMERGENCY DEPT | Facility: HOSPITAL | Age: 50
End: 2025-03-01

## 2025-03-01 NOTE — UTILIZATION REVIEW
Initial Clinical Review    The patient was transferred to [facility] ([facility] on 2/28/25 from [facility], where care began on 2/27/25. 1 midnight has already been surpassed with active ongoing care.     Admission: Date/Time/Statement:   Admission Orders (From admission, onward)       Ordered        02/28/25 1651  INPATIENT ADMISSION  Once                          Orders Placed This Encounter   Procedures    INPATIENT ADMISSION     Standing Status:   Standing     Number of Occurrences:   1     Level of Care:   Med Surg [16]     Estimated length of stay:   Inpatient Only Surgery     Initial Presentation: 49 y.o. female with PMHx includes DM, Asthma, arthritis, gastric bypass,  presented due to left-sided back pain, nausea and vomiting for one day. In the ED, CT scan which showed a distal 5 mm ureteral calculus on the left side. Transferred to [facility] for Urology intervention.     Plan:  Admit Inpatient Dx Kidney stone on left side  :  plan for cystoscopy, ureteroscopy, holmium laser lithotripsy, basket stone extraction, retrograde pyelogram, insertion of LEFT ureteral stent.  Keep NPO, bladder scan, monitor IO. SCDs.     2/28 OP Note:  Procedure(s):  Left - CYSTOSCOPY URETEROSCOPY. BASKET STONE EXTRACTION RETROGRADE PYELOGRAM AND INSERTION STENT URETERAL  Specimen(s):  ID Type Source Tests Collected by Time Destination   A : Left Uretral Stone Calculus Ureter, Left STONE ANALYSIS Pedro Longo MD 2/28/2025 [id number]       Anesthesia Type: General     Operative Indications:  Flank pain [R10.9]  Kidney stone [N20.0]  Left flank pain [R10.9]     Operative Findings: Left ureteral calculus    Medications 02/27 02/28   carvedilol (COREG) tablet 6.25 mg  Dose: 6.25 mg  Freq: 2 times daily Route: PO  Start: 02/28/25 1800 End: 02/28/25 1941   Admin Instructions:      Order specific questions:        1941-D/C'd      carvedilol (COREG) tablet 6.25  mg  Dose: 6.25 mg  Freq: 2 times daily Route: PO  Start: 02/28/25 0900 End: 02/28/25 1418   Admin Instructions:      Order specific questions:        1006     1418-D/C'd      cefepime (MAXIPIME) 2 g/50 mL dextrose IVPB  Dose: 2,000 mg  Freq: Every 12 hours Route: IV  Last Dose: Stopped (02/28/25 1245)  Start: 02/28/25 1100 End: 02/28/25 1418   Admin Instructions:      Order specific questions:        1013     1245     1418-D/C'd      cefepime (MAXIPIME) 2 g/50 mL dextrose IVPB  Dose: 2,000 mg  Freq: Once Route: IV  Last Dose: Stopped (02/28/25 0035)  Start: 02/27/25 2345 End: 02/28/25 0035   Admin Instructions:      Order specific questions:        0005     0035        ceFEPime (MAXIPIME) 2,000 mg in dextrose 5 % 50 mL IVPB  Dose: 2,000 mg  Freq: Every 12 hours Route: IV  Start: 02/28/25 2300 End: 02/28/25 1941   Admin Instructions:      Order specific questions:        1941-D/C'd      diphenhydrAMINE (BENADRYL) injection 25 mg  Dose: 25 mg  Freq: Once Route: IV  Start: 02/27/25 2145 End: 02/27/25 2144   Admin Instructions:       2144         famotidine (PEPCID) tablet 20 mg  Dose: 20 mg  Freq: 2 times daily Route: PO  Start: 02/28/25 0900 End: 02/28/25 0508     0508-D/C'd      heparin (porcine) subcutaneous injection 5,000 Units  Dose: 5,000 Units  Freq: Every 8 hours scheduled Route: SC  Start: 02/28/25 2200 End: 02/28/25 1941   Admin Instructions:        1941-D/C'd      heparin (porcine) subcutaneous injection 5,000 Units  Dose: 5,000 Units  Freq: Every 8 hours scheduled Route: SC  Start: 02/28/25 0600 End: 02/28/25 1418   Admin Instructions:        0641     1313) [C]     1418-D/C'd       insulin lispro (HumALOG/ADMELOG) 100 units/mL subcutaneous injection 1-6 Units  Dose: 1-6 Units  Freq: Every 6 hours scheduled Route: SC  Start: 02/28/25 1800 End: 02/28/25 1941   Admin Instructions:        1941-D/C'd       insulin lispro (HumALOG/ADMELOG) 100 units/mL subcutaneous injection 1-6 Units  Dose: 1-6 Units  Freq:  Every 6 hours scheduled Route: SC  Start: 02/28/25 0230 End: 02/28/25 1418   Admin Instructions:        (9212) [C]     (7606) [C]     (9622)     1418-D/C'd      ketorolac (TORADOL) injection 15 mg  Dose: 15 mg  Freq: Once Route: IV  Start: 02/27/25 2045 End: 02/27/25 2043   Admin Instructions:       2043         losartan (COZAAR) tablet 100 mg  Dose: 100 mg  Freq: Daily Route: PO  Start: 03/01/25 0900 End: 02/28/25 1941   Order specific questions:        1941-D/C'd      losartan (COZAAR) tablet 100 mg  Dose: 100 mg  Freq: Daily Route: PO  Start: 02/28/25 0900 End: 02/28/25 1418   Order specific questions:        1005     1418-D/C'd      metoclopramide (REGLAN) injection 10 mg  Dose: 10 mg  Freq: Once Route: IV  Start: 02/27/25 2145 End: 02/27/25 2144 2144         morphine injection 4 mg  Dose: 4 mg  Freq: Once Route: IV  Start: 02/27/25 2130 End: 02/27/25 2123   Admin Instructions:       2123         morphine injection 6 mg  Dose: 6 mg  Freq: Once Route: IV  Start: 02/27/25 2330 End: 02/27/25 2331   Admin Instructions:       2331         ondansetron (ZOFRAN) injection 4 mg  Dose: 4 mg  Freq: Once Route: IV  Start: 02/27/25 2045 End: 02/27/25 2042   Admin Instructions:       2042         pantoprazole (PROTONIX) injection 40 mg  Dose: 40 mg  Freq: Daily (early morning) Route: IV  Start: 03/01/25 0600 End: 02/28/25 1941   Admin Instructions:        1941-D/C'd      pantoprazole (PROTONIX) injection 40 mg  Dose: 40 mg  Freq: Daily (early morning) Route: IV  Start: 02/28/25 0600 End: 02/28/25 1418   Admin Instructions:        0641     1418-D/C'd      pravastatin (PRAVACHOL) tablet 80 mg  Dose: 80 mg  Freq: Daily with dinner Route: PO  Start: 02/28/25 1700 End: 02/28/25 1941 1700 1941-D/C'd      pravastatin (PRAVACHOL) tablet 80 mg  Dose: 80 mg  Freq: Daily with dinner Route: PO  Start: 02/28/25 1630 End: 02/28/25 1418     1418-D/C'd      sodium chloride 0.9 % bolus 1,000 mL  Dose: 1,000 mL  Freq: Once Route:  IV  Last Dose: Stopped (02/27/25 2245)  Start: 02/27/25 2045 End: 02/27/25 2245 2043 2245         tamsulosin (FLOMAX) capsule 0.4 mg  Dose: 0.4 mg  Freq: Daily with dinner Route: PO  Start: 02/28/25 1700 End: 02/28/25 1941   Admin Instructions:        1700     1941-D/C'd      tamsulosin (FLOMAX) capsule 0.4 mg  Dose: 0.4 mg  Freq: Daily with dinner Route: PO  Start: 02/28/25 1630 End: 02/28/25 1418   Admin Instructions:        1418-D/C'd      traZODone (DESYREL) tablet 150 mg  Dose: 150 mg  Freq: Daily at bedtime Route: PO  Start: 02/28/25 2200 End: 02/28/25 1941   Admin Instructions:        1941-D/C'd      traZODone (DESYREL) tablet 150 mg  Dose: 150 mg  Freq: Daily at bedtime Route: PO  Start: 02/28/25 2200 End: 02/28/25 1418   Admin Instructions:        1418-D/C'd                  Continuous Meds Sorted by Name  for Vale eFlton as of 02/19/25 through 2/28/25  Legend:       Medications 02/27 02/28   lactated ringers infusion  Rate: 75 mL/hr Dose: 75 mL/hr  Freq: Continuous Route: IV  Indications of Use: IV Hydration  Start: 02/28/25 1700 End: 02/28/25 1941 1700     1941-D/C'd      lactated ringers infusion  Freq: Continuous PRN Route: IV  Last Dose: Stopped (02/28/25 1724)  Start: 02/28/25 1444 End: 02/28/25 1526     1444     1518     1526-D/C'd  1724 [C]        lactated ringers infusion  Rate: 75 mL/hr Dose: 75 mL/hr  Freq: Continuous Route: IV  Indications of Use: IV Hydration  Last Dose: Stopped (02/28/25 1324)  Start: 02/28/25 0230 End: 02/28/25 1418     0344     1324     1418-D/C'd                  PRN Meds Sorted by Name  for Vale Felton as of 02/19/25 through 2/28/25  Legend:       Medications 02/27 02/28   acetaminophen (TYLENOL) tablet 650 mg  Dose: 650 mg  Freq: Every 6 hours PRN Route: PO  PRN Reasons: mild pain,headaches,fever  Indications of Use: FEVER,HEADACHE,MILD PAIN  Start: 02/28/25 1545 End: 02/28/25 1941     1941-D/C'd      acetaminophen (TYLENOL) tablet 650 mg  Dose: 650  mg  Freq: Every 6 hours PRN Route: PO  PRN Reasons: mild pain,headaches,fever  Indications of Use: FEVER,HEADACHE,MILD PAIN  Start: 02/28/25 0217 End: 02/28/25 1418     1418-D/C'd      albuterol (PROVENTIL HFA,VENTOLIN HFA) inhaler 2 puff  Dose: 2 puff  Freq: Every 6 hours PRN Route: IN  PRN Reasons: wheezing,shortness of breath  Start: 02/28/25 1646 End: 02/28/25 1941   Admin Instructions:        1941-D/C'd      albuterol (PROVENTIL HFA,VENTOLIN HFA) inhaler 2 puff  Dose: 2 puff  Freq: Every 6 hours PRN Route: IN  PRN Reasons: wheezing,shortness of breath  Start: 02/28/25 0217 End: 02/28/25 1418   Admin Instructions:        1418-D/C'd      aluminum-magnesium hydroxide-simethicone (MAALOX) oral suspension 30 mL  Dose: 30 mL  Freq: Every 6 hours PRN Route: PO  PRN Reasons: indigestion,heartburn  Start: 02/28/25 1646 End: 02/28/25 1941   Admin Instructions:        1941-D/C'd      aluminum-magnesium hydroxide-simethicone (MAALOX) oral suspension 30 mL  Dose: 30 mL  Freq: Every 6 hours PRN Route: PO  PRN Reasons: indigestion,heartburn  Start: 02/28/25 0217 End: 02/28/25 1418   Admin Instructions:        1418-D/C'd      calcium carbonate (TUMS) chewable tablet 1,000 mg  Dose: 1,000 mg  Freq: Daily PRN Route: PO  PRN Reasons: indigestion,heartburn  Start: 02/28/25 1646 End: 02/28/25 1941   Admin Instructions:        1941-D/C'd      calcium carbonate (TUMS) chewable tablet 1,000 mg  Dose: 1,000 mg  Freq: Daily PRN Route: PO  PRN Reasons: indigestion,heartburn  Start: 02/28/25 0217 End: 02/28/25 1418   Admin Instructions:        1418-D/C'd      dexamethasone (PF) (DECADRON) injection  Freq: As needed Route: IV  Start: 02/28/25 1452 End: 02/28/25 1526     1452     1526-D/C'd      ePHEDrine injection  Freq: As needed Route: IV  Start: 02/28/25 1514 End: 02/28/25 1526     1514     1526-D/C'd      fentaNYL (SUBLIMAZE) injection 25 mcg  Dose: 25 mcg  Freq: Every 5 minutes PRN Route: IV  PRN Reason: Pain - PACU  PRN Comment:  Breakthrough - first line  Start: 02/28/25 1527 End: 02/28/25 1559   Admin Instructions:        1559-D/C'd      fentaNYL injection  Freq: As needed Route: IV  Start: 02/28/25 1500 End: 02/28/25 1526     1500     1503     1505     1526-D/C'd      glycopyrrolate (ROBINUL) injection  Freq: As needed Route: IV  Start: 02/28/25 1503 End: 02/28/25 1526     1503     1526-D/C'd      HYDROcodone-acetaminophen (NORCO) 5-325 mg per tablet 1 tablet  Dose: 1 tablet  Freq: Every 6 hours PRN Route: PO  PRN Reason: moderate pain  Start: 02/28/25 1531 End: 02/28/25 1941   Admin Instructions:        1608     1941-D/C'd      HYDROmorphone (DILAUDID) injection 0.2 mg  Dose: 0.2 mg  Freq: Every 4 hours PRN Route: IV  PRN Reason: other  PRN Comment: Breakthrough pain or if patient qualifies for treatment of moderate or severe pain but cannot take oral medications  Start: 02/28/25 1646 End: 02/28/25 1941   Admin Instructions:        1941-D/C'd      HYDROmorphone (DILAUDID) injection 0.5 mg  Dose: 0.5 mg  Freq: Every 10 minutes PRN Route: IV  PRN Reason: Pain - PACU  PRN Comment: Breakthrough Pain. Second Line  Start: 02/28/25 1527 End: 02/28/25 1559   Admin Instructions:        1559-D/C'd      HYDROmorphone HCl (DILAUDID) injection 0.2 mg  Dose: 0.2 mg  Freq: Every 4 hours PRN Route: IV  PRN Reasons: breakthrough pain,other  PRN Comment: or if patient qualifies for treatment of moderate or severe pain but cannot take oral medications  Start: 02/28/25 0219 End: 02/28/25 1418   Admin Instructions:        0333     1321     1418-D/C'd      iohexol (OMNIPAQUE) 240 MG/ML solution  Freq: As needed  Start: 02/28/25 1500 End: 02/28/25 1527     1500     1527-D/C'd      iohexol (OMNIPAQUE) 350 MG/ML injection (MULTI-DOSE) 100 mL  Dose: 100 mL  Freq: Once in imaging Route: IV  PRN Reason: contrast  Start: 02/27/25 2159 End: 02/27/25 2200    2200         lactated ringers infusion  Freq: Continuous PRN Route: IV  Start: 02/28/25 1444 End: 02/28/25 1526      1444     1518     1526-D/C'd  1724 [C]        lidocaine (PF) (XYLOCAINE-MPF) 1 % injection  Freq: As needed Route: IV  Start: 02/28/25 1452 End: 02/28/25 1526     1452     1526-D/C'd      midazolam (VERSED) injection  Freq: As needed Route: IV  Start: 02/28/25 1446 End: 02/28/25 1526     1446     1526-D/C'd      ondansetron (ZOFRAN) injection  Freq: As needed Route: IV  Start: 02/28/25 1446 End: 02/28/25 1526     1446     1526-D/C'd      ondansetron (ZOFRAN) injection 4 mg  Dose: 4 mg  Freq: Every 6 hours PRN Route: IV  PRN Reason: nausea  Start: 02/28/25 1646 End: 02/28/25 1941   Admin Instructions:        1941-D/C'd      ondansetron (ZOFRAN) injection 4 mg  Dose: 4 mg  Freq: Every 6 hours PRN Route: IV  PRN Reason: nausea  Start: 02/28/25 0218 End: 02/28/25 1418   Admin Instructions:        1011     1418-D/C'd       oxyCODONE (ROXICODONE) IR tablet 2.5 mg  Dose: 2.5 mg  Freq: Every 4 hours PRN Route: PO  PRN Reason: moderate pain  Start: 02/28/25 1601 End: 02/28/25 1941   Admin Instructions:        1941-D/C'd      Or   oxyCODONE (ROXICODONE) IR tablet 5 mg  Dose: 5 mg  Freq: Every 4 hours PRN Route: PO  PRN Reason: severe pain  Start: 02/28/25 1601 End: 02/28/25 1941   Admin Instructions:        1941-D/C'd       oxyCODONE (ROXICODONE) split tablet 2.5 mg  Dose: 2.5 mg  Freq: Every 4 hours PRN Route: PO  PRN Reason: moderate pain  Start: 02/28/25 0219 End: 02/28/25 1418   Admin Instructions:        0641          1418-D/C'd      Or   oxyCODONE (ROXICODONE) IR tablet 5 mg  Dose: 5 mg  Freq: Every 4 hours PRN Route: PO  PRN Reason: severe pain  Start: 02/28/25 0219 End: 02/28/25 1418   Admin Instructions:             1011     1418-D/C'd      phenylephrine bolus from bag  Freq: As needed Route: IV  Start: 02/28/25 1501 End: 02/28/25 1526     1501     1514     1526-D/C'd      propofol (DIPRIVAN) 200 MG/20ML bolus injection  Freq: As needed Route: IV  Start: 02/28/25 1452 End: 02/28/25 1526     1452     1526-D/C'd         ED Triage Vitals [02/28/25 1529]   Temperature Pulse Respirations Blood Pressure SpO2 Pain Score   98 °F (36.7 °C) 74 18 158/91 93 % No Pain     Weight (last 2 days) before discharge       None            Vital Signs (last 3 days) before discharge       Date/Time Temp Pulse Resp BP MAP (mmHg) SpO2 O2 Device Cardiac (WDL) Pain    02/28/25 1608 -- -- -- -- -- -- -- -- 6    02/28/25 1556 98.1 °F (36.7 °C) 58 -- 154/98 -- 98 % None (Room air) -- --    02/28/25 1545 98.6 °F (37 °C) 53 16 157/89 117 97 % -- WDL --    02/28/25 1529 98 °F (36.7 °C) 74 18 158/91 119 93 % None (Room air) WDL No Pain            Pertinent Labs/Diagnostic Test Results:   Radiology:  No orders to display     Cardiology:  No orders to display     GI:  No orders to display           Results from last 7 days   Lab Units 02/28/25 0413 02/27/25  2042   WBC Thousand/uL 10.15 11.86*   HEMOGLOBIN g/dL 10.7* 12.4   HEMATOCRIT % 34.3* 40.8   PLATELETS Thousands/uL 306 390   TOTAL NEUT ABS Thousands/µL 6.80 9.52*         Results from last 7 days   Lab Units 02/28/25 0413 02/27/25  2042   SODIUM mmol/L 139 140   POTASSIUM mmol/L 4.0 3.9   CHLORIDE mmol/L 108 105   CO2 mmol/L 26 27   ANION GAP mmol/L 5 8   BUN mg/dL 19 18   CREATININE mg/dL 0.90 0.89   EGFR ml/min/1.73sq m 74 75   CALCIUM mg/dL 8.5 9.3   MAGNESIUM mg/dL 1.7*  --    PHOSPHORUS mg/dL 4.4  --      Results from last 7 days   Lab Units 02/28/25 0413 02/27/25  2042   AST U/L 12* 12*   ALT U/L 9 11   ALK PHOS U/L 63 78   TOTAL PROTEIN g/dL 6.0* 7.4   ALBUMIN g/dL 3.2* 4.0   TOTAL BILIRUBIN mg/dL 0.75 0.68     Results from last 7 days   Lab Units 02/28/25  1533 02/28/25  1117 02/28/25  0640 02/28/25  0341   POC GLUCOSE mg/dl 104 103 94 97     Results from last 7 days   Lab Units 02/28/25  0413 02/27/25  2042   GLUCOSE RANDOM mg/dL 103 167*         Results from last 7 days   Lab Units 02/28/25  0346   HEMOGLOBIN A1C % 6.5*   EAG mg/dl 140     Results from last 7 days   Lab Units 02/27/25  7290    PROCALCITONIN ng/ml <0.05     Results from last 7 days   Lab Units 02/27/25  2042   LACTIC ACID mmol/L 1.0       Results from last 7 days   Lab Units 02/27/25  2042   LIPASE u/L 15     Results from last 7 days   Lab Units 02/27/25 2042   CLARITY UA  Clear   COLOR UA  Yellow   SPEC GRAV UA  1.031*   PH UA  5.5   GLUCOSE UA mg/dl Negative   KETONES UA mg/dl Trace*   BLOOD UA  Moderate*   PROTEIN UA mg/dl Trace*   NITRITE UA  Negative   BILIRUBIN UA  Negative   UROBILINOGEN UA (BE) mg/dl 2.0*   LEUKOCYTES UA  Moderate*   WBC UA /hpf 30-50*   RBC UA /hpf 30-50*   BACTERIA UA /hpf None Seen   EPITHELIAL CELLS WET PREP /hpf Occasional   MUCUS THREADS  Moderate*       Results from last 7 days   Lab Units 02/28/25  0004 02/27/25  2356 02/27/25 2042   BLOOD CULTURE  No Growth at 24 hrs. No Growth at 24 hrs.  --    URINE CULTURE   --   --  50,000-59,000 cfu/ml Gram Negative Ramiro*     Past Medical History:   Diagnosis Date    Arthritis     Asthma     Diabetes mellitus (HCC)     Hypertension      Present on Admission:   Kidney stone on left side      Admitting Diagnosis: Flank pain [R10.9]  Kidney stone [N20.0]  Left flank pain [R10.9]  Age/Sex: 49 y.o. female    Network Utilization Review Department  ATTENTION: Please call with any questions or concerns to [phone] and carefully listen to the prompts so that you are directed to the right person. All voicemails are confidential.   For Discharge needs, contact Care Management DC Support Team at [phone] opt. 2  Send all requests for admission clinical reviews, approved or denied determinations and any other requests to dedicated fax number below belonging to the campus where the patient is receiving treatment. List of dedicated fax numbers for the Facilities:  FACILITY NAME UR FAX NUMBER   ADMISSION DENIALS (Administrative/Medical Necessity) [phone]   DISCHARGE SUPPORT TEAM (NETWORK) [phone]   PARENT CHILD HEALTH (Maternity/NICU/Pediatrics) [phone]    [facility] [phone]   [facility] [phone]   [facility] [phone]   [facility] [phone]   [facility] [phone]   [facility] [phone]   [facility] [phone]   [facility] [phone]   [facility] [phone]   [facility] [phone]   [facility] [phone]   [facility] [phone]

## 2025-03-01 NOTE — ANESTHESIA POSTPROCEDURE EVALUATION
Post-Op Assessment Note    CV Status:  Stable    Pain management: adequate       Mental Status:  Alert and awake   Hydration Status:  Euvolemic   PONV Controlled:  Controlled   Airway Patency:  Patent     Post Op Vitals Reviewed: Yes    No anethesia notable event occurred.    Staff: CRNA, Anesthesiologist           Last Filed PACU Vitals:  Vitals Value Taken Time   Temp     Pulse 74 02/28/25 1530   /91    Resp 23 02/28/25 1530   SpO2 93 % 02/28/25 1530   Vitals shown include unfiled device data.    Modified Luann:     Vitals Value Taken Time   Activity 2 02/28/25 1529   Respiration 2 02/28/25 1529   Circulation 2 02/28/25 1529   Consciousness 2 02/28/25 1529   Oxygen Saturation 2 02/28/25 1529     Modified Luann Score: 10

## 2025-03-02 LAB
BACTERIA BLD CULT: NORMAL
BACTERIA BLD CULT: NORMAL
BACTERIA UR CULT: ABNORMAL

## 2025-03-03 ENCOUNTER — TELEPHONE (OUTPATIENT)
Age: 50
End: 2025-03-03

## 2025-03-03 DIAGNOSIS — N20.0 CALCULUS OF KIDNEY: Primary | ICD-10-CM

## 2025-03-03 NOTE — TELEPHONE ENCOUNTER
Post Op Note    Vale Felton is a 49 y.o. female s/p CYSTOSCOPY URETEROSCOPY, RETROGRADE PYELOGRAM AND INSERTION STENT URETERAL (Left: Bladder) performed 2/28/2025.  Vale Felton had surgery by Dr. Longo while on call.       Pain after surgery? Patient advises she has discomfort/achy at times to the affected area. She advises the Oxy is assisting with her pain. Advised of also taking Ibuprofen, Motrin & Tylenol as well as using a warm heating pad. She is understanding.    Have you had a bowel movement since surgery? Yes    Do you have any difficulty urinating? No    Do you have any other questions or concerns that I can address at this time?         Spoke to patient and she is doing well after surgery. Advised of Dr. Longo's recommendations of removing the stent in 3-5 days. Inquired if she would be comfortable removing it herself or if she would like to be scheduled in the office. Patient advises she is not comfortable removing it herself and would like to be scheduled in the office. Appointment has been scheduled for tomorrow, March 4 at the [facility]. Location provided. Advised of obtaining an US in 2-3 months and will call with the results or she can contact the office after she has the US scheduled, to schedule a follow up appointment with the AP to review the results. Order placed. Central scheduling number was provided to schedule US. Advised to contact the office in the meantime with any questions or concerns. She is understanding.

## 2025-03-04 ENCOUNTER — PROCEDURE VISIT (OUTPATIENT)
Dept: UROLOGY | Facility: CLINIC | Age: 50
End: 2025-03-04

## 2025-03-04 VITALS
HEART RATE: 58 BPM | WEIGHT: 225 LBS | TEMPERATURE: 97.6 F | SYSTOLIC BLOOD PRESSURE: 124 MMHG | DIASTOLIC BLOOD PRESSURE: 68 MMHG | HEIGHT: 64 IN | BODY MASS INDEX: 38.41 KG/M2 | OXYGEN SATURATION: 98 %

## 2025-03-04 DIAGNOSIS — N20.0 CALCULUS OF KIDNEY: Primary | ICD-10-CM

## 2025-03-04 PROCEDURE — 99024 POSTOP FOLLOW-UP VISIT: CPT

## 2025-03-04 NOTE — PROGRESS NOTES
Patient presented to office to have Stent with String removed that was inserted on 2/28/25 by Dr. Longo. Upon careful inspection of patient's urethra, string was not visible. Patient states she did not remove stent at home because she did not feel comfortable with doing so and she could not find the string on the day she went home post procedure. Patient denies having any pain but does have some slight discomfort with stent being in place. This nurse made Pura PFEIFFER aware. Patient is offered an appointment for Friday 3/7/25 at 11 am to have stent removed via cysto. Patient is agreeable with this plan. Patient is instructed to immediately contact office at the first sigh of dysuria or pain. Patient verbalizes understanding.

## 2025-03-05 LAB
BACTERIA BLD CULT: NORMAL
BACTERIA BLD CULT: NORMAL

## 2025-03-06 NOTE — PROGRESS NOTES
Cystoscopy     Date/Time  3/7/2025 11:00 AM     Performed by  Pura Joyner PA-C   Authorized by  Puar Joyner PA-C     Universal Protocol:  Consent: Verbal consent obtained. Written consent obtained.  Consent given by: patient  Patient understanding: patient states understanding of the procedure being performed  Patient consent: the patient's understanding of the procedure matches consent given  Procedure consent: procedure consent matches procedure scheduled  Patient identity confirmed: verbally with patient      Procedure Details:  Procedure type: simple removal of a foreign body, stone, or stent    Patient tolerance: Patient tolerated the procedure well with no immediate complications    Additional Procedure Details: Patient cleaned and draped in sterile fashion. Urojet injected into urethra. Cystoscope introduced in urethra and advanced into bladder with visualization and then subsequent removal of ureteral stent using stent grasper device.          Assessment and Plan:  Nephrolithiasis  - Follow up in 2-3 months with US prior to visit  - ER precautions      Pura Joyner PA-C

## 2025-03-07 ENCOUNTER — PROCEDURE VISIT (OUTPATIENT)
Dept: UROLOGY | Facility: CLINIC | Age: 50
End: 2025-03-07
Payer: COMMERCIAL

## 2025-03-07 VITALS
WEIGHT: 223 LBS | OXYGEN SATURATION: 99 % | HEIGHT: 64 IN | HEART RATE: 55 BPM | BODY MASS INDEX: 38.07 KG/M2 | TEMPERATURE: 97.8 F | DIASTOLIC BLOOD PRESSURE: 82 MMHG | SYSTOLIC BLOOD PRESSURE: 130 MMHG

## 2025-03-07 DIAGNOSIS — N20.0 CALCULUS OF KIDNEY: Primary | ICD-10-CM

## 2025-03-07 LAB
SL AMB  POCT GLUCOSE, UA: NORMAL
SL AMB LEUKOCYTE ESTERASE,UA: NORMAL
SL AMB POCT BILIRUBIN,UA: NORMAL
SL AMB POCT BLOOD,UA: NORMAL
SL AMB POCT CLARITY,UA: NORMAL
SL AMB POCT COLOR,UA: NORMAL
SL AMB POCT KETONES,UA: NORMAL
SL AMB POCT NITRITE,UA: NORMAL
SL AMB POCT PH,UA: 6.5
SL AMB POCT SPECIFIC GRAVITY,UA: 1.03
SL AMB POCT URINE PROTEIN: NORMAL
SL AMB POCT UROBILINOGEN: 0.2

## 2025-03-07 PROCEDURE — 52310 CYSTOSCOPY AND TREATMENT: CPT | Performed by: PHYSICIAN ASSISTANT

## 2025-03-07 PROCEDURE — 81002 URINALYSIS NONAUTO W/O SCOPE: CPT | Performed by: PHYSICIAN ASSISTANT

## 2025-03-10 LAB
CALCIUM OXALATE DIHYDRATE MFR STONE IR: 10 %
COLOR STONE: NORMAL
COM MFR STONE: 90 %
COMMENT-STONE3: NORMAL
COMPOSITION: NORMAL
LABORATORY COMMENT REPORT: NORMAL
PHOTO: NORMAL
SIZE STONE: NORMAL MM
SPEC SOURCE SUBJ: NORMAL
STONE ANALYSIS-IMP: NORMAL
STONE ANALYSIS-IMP: NORMAL
WT STONE: 40 MG

## 2025-06-06 ENCOUNTER — TELEPHONE (OUTPATIENT)
Dept: UROLOGY | Facility: CLINIC | Age: 50
End: 2025-06-06

## 2025-06-06 NOTE — TELEPHONE ENCOUNTER
Called and spoke to the PT. PT No Showed US appt on 5/28/25. PT has an appt with Pura PFEIFFER on 6/12/25 to go over US. Gave the PT CS to reschedule appt for US and if the PT can find anything within the next few days I let the PT know can keep the appt with Pura. However, let the PT know if unable to get an US appt within a few days to give our Office a call to reschedule appt with another Provider due to Pura PFEIFFER going on Maternity leave. PT verbalized understanding.

## 2025-07-30 ENCOUNTER — TELEPHONE (OUTPATIENT)
Dept: UROLOGY | Facility: CLINIC | Age: 50
End: 2025-07-30

## (undated) DEVICE — PACK TUR

## (undated) DEVICE — SYRINGE 10ML LL

## (undated) DEVICE — BASKET SPECIMEN RETRIVAL 1.9FR 120CM

## (undated) DEVICE — PREMIUM DRY TRAY LF: Brand: MEDLINE INDUSTRIES, INC.

## (undated) DEVICE — SPECIMEN CONTAINER STERILE PEEL PACK

## (undated) DEVICE — GUIDEWIRE STRGHT TIP 0.035 IN  SOLO PLUS

## (undated) DEVICE — CATH URETERAL 5FR X 70 CM FLEX TIP POLYUR BARD

## (undated) DEVICE — GLOVE SRG BIOGEL ORTHOPEDIC 7.5

## (undated) DEVICE — CHLORHEXIDINE 4PCT 4 OZ